# Patient Record
Sex: FEMALE | Race: WHITE | NOT HISPANIC OR LATINO | Employment: UNEMPLOYED | URBAN - METROPOLITAN AREA
[De-identification: names, ages, dates, MRNs, and addresses within clinical notes are randomized per-mention and may not be internally consistent; named-entity substitution may affect disease eponyms.]

---

## 2017-01-11 ENCOUNTER — APPOINTMENT (OUTPATIENT)
Dept: LAB | Facility: HOSPITAL | Age: 72
End: 2017-01-11
Attending: UROLOGY
Payer: MEDICARE

## 2017-01-11 ENCOUNTER — TRANSCRIBE ORDERS (OUTPATIENT)
Dept: LAB | Facility: HOSPITAL | Age: 72
End: 2017-01-11

## 2017-01-11 DIAGNOSIS — N20.0 URIC ACID NEPHROLITHIASIS: ICD-10-CM

## 2017-01-11 DIAGNOSIS — N20.0 CALCULUS OF KIDNEY: ICD-10-CM

## 2017-01-11 DIAGNOSIS — N20.0 URIC ACID NEPHROLITHIASIS: Primary | ICD-10-CM

## 2017-01-11 LAB
ABO GROUP BLD: NORMAL
ANION GAP SERPL CALCULATED.3IONS-SCNC: 6 MMOL/L (ref 4–13)
ATRIAL RATE: 78 BPM
BLD GP AB SCN SERPL QL: NEGATIVE
BUN SERPL-MCNC: 25 MG/DL (ref 5–25)
CALCIUM SERPL-MCNC: 8.9 MG/DL (ref 8.3–10.1)
CHLORIDE SERPL-SCNC: 103 MMOL/L (ref 100–108)
CO2 SERPL-SCNC: 30 MMOL/L (ref 21–32)
CREAT SERPL-MCNC: 0.9 MG/DL (ref 0.6–1.3)
GFR SERPL CREATININE-BSD FRML MDRD: >60 ML/MIN/1.73SQ M
GLUCOSE SERPL-MCNC: 147 MG/DL (ref 65–140)
P AXIS: 67 DEGREES
POTASSIUM SERPL-SCNC: 4.5 MMOL/L (ref 3.5–5.3)
PR INTERVAL: 136 MS
QRS AXIS: 7 DEGREES
QRSD INTERVAL: 70 MS
QT INTERVAL: 366 MS
QTC INTERVAL: 417 MS
RH BLD: POSITIVE
SODIUM SERPL-SCNC: 139 MMOL/L (ref 136–145)
T WAVE AXIS: 48 DEGREES
VENTRICULAR RATE: 78 BPM

## 2017-01-11 PROCEDURE — 80048 BASIC METABOLIC PNL TOTAL CA: CPT

## 2017-01-11 PROCEDURE — 86901 BLOOD TYPING SEROLOGIC RH(D): CPT

## 2017-01-11 PROCEDURE — 36415 COLL VENOUS BLD VENIPUNCTURE: CPT

## 2017-01-11 PROCEDURE — 86900 BLOOD TYPING SEROLOGIC ABO: CPT

## 2017-01-11 PROCEDURE — 86850 RBC ANTIBODY SCREEN: CPT

## 2017-01-11 PROCEDURE — 93005 ELECTROCARDIOGRAM TRACING: CPT

## 2017-01-12 ENCOUNTER — APPOINTMENT (OUTPATIENT)
Dept: MAMMOGRAPHY | Facility: HOSPITAL | Age: 72
End: 2017-01-12
Payer: MEDICARE

## 2017-01-19 ENCOUNTER — ALLSCRIPTS OFFICE VISIT (OUTPATIENT)
Dept: OTHER | Facility: OTHER | Age: 72
End: 2017-01-19

## 2017-01-20 RX ORDER — OLMESARTAN MEDOXOMIL 40 MG/1
40 TABLET ORAL DAILY
COMMUNITY
End: 2018-05-08 | Stop reason: SDUPTHER

## 2017-02-14 ENCOUNTER — ANESTHESIA EVENT (OUTPATIENT)
Dept: PERIOP | Facility: HOSPITAL | Age: 72
End: 2017-02-14
Payer: MEDICARE

## 2017-02-15 ENCOUNTER — ANESTHESIA (OUTPATIENT)
Dept: PERIOP | Facility: HOSPITAL | Age: 72
End: 2017-02-15
Payer: MEDICARE

## 2017-02-15 ENCOUNTER — APPOINTMENT (OUTPATIENT)
Dept: RADIOLOGY | Facility: HOSPITAL | Age: 72
End: 2017-02-15
Attending: UROLOGY
Payer: MEDICARE

## 2017-02-15 ENCOUNTER — HOSPITAL ENCOUNTER (OUTPATIENT)
Facility: HOSPITAL | Age: 72
Discharge: HOME | End: 2017-02-16
Attending: UROLOGY | Admitting: UROLOGY
Payer: MEDICARE

## 2017-02-15 ENCOUNTER — APPOINTMENT (OUTPATIENT)
Dept: RADIOLOGY | Facility: HOSPITAL | Age: 72
End: 2017-02-15
Payer: MEDICARE

## 2017-02-15 ENCOUNTER — GENERIC CONVERSION - ENCOUNTER (OUTPATIENT)
Dept: PERIOP | Facility: HOSPITAL | Age: 72
End: 2017-02-15

## 2017-02-15 DIAGNOSIS — N20.0 URIC ACID NEPHROLITHIASIS: ICD-10-CM

## 2017-02-15 DIAGNOSIS — N20.0 CALCULUS OF KIDNEY: ICD-10-CM

## 2017-02-15 LAB
ABO GROUP BLD: NORMAL
ANION GAP SERPL CALCULATED.3IONS-SCNC: 8 MMOL/L (ref 4–13)
BLD GP AB SCN SERPL QL: NEGATIVE
BUN SERPL-MCNC: 20 MG/DL (ref 5–25)
CALCIUM SERPL-MCNC: 8.1 MG/DL (ref 8.3–10.1)
CHLORIDE SERPL-SCNC: 111 MMOL/L (ref 100–108)
CO2 SERPL-SCNC: 25 MMOL/L (ref 21–32)
CREAT SERPL-MCNC: 0.93 MG/DL (ref 0.6–1.3)
ERYTHROCYTE [DISTWIDTH] IN BLOOD BY AUTOMATED COUNT: 14.5 % (ref 11.6–15.1)
GFR SERPL CREATININE-BSD FRML MDRD: 59.4 ML/MIN/1.73SQ M
GLUCOSE SERPL-MCNC: 147 MG/DL (ref 65–140)
HCT VFR BLD AUTO: 35.2 % (ref 34.8–46.1)
HGB BLD-MCNC: 11.8 G/DL (ref 11.5–15.4)
MCH RBC QN AUTO: 31.6 PG (ref 26.8–34.3)
MCHC RBC AUTO-ENTMCNC: 33.5 G/DL (ref 31.4–37.4)
MCV RBC AUTO: 94 FL (ref 82–98)
PLATELET # BLD AUTO: 227 THOUSANDS/UL (ref 149–390)
PMV BLD AUTO: 9.5 FL (ref 8.9–12.7)
POTASSIUM SERPL-SCNC: 3.7 MMOL/L (ref 3.5–5.3)
RBC # BLD AUTO: 3.74 MILLION/UL (ref 3.81–5.12)
RH BLD: POSITIVE
SODIUM SERPL-SCNC: 144 MMOL/L (ref 136–145)
WBC # BLD AUTO: 6.25 THOUSAND/UL (ref 4.31–10.16)

## 2017-02-15 PROCEDURE — C1769 GUIDE WIRE: HCPCS | Performed by: UROLOGY

## 2017-02-15 PROCEDURE — 50395 HB CREATE PASSAGE TO KIDNEY: CPT

## 2017-02-15 PROCEDURE — C1758 CATHETER, URETERAL: HCPCS | Performed by: UROLOGY

## 2017-02-15 PROCEDURE — 71010 HB CHEST X-RAY 1 VIEW FRONTAL (PORTABLE): CPT

## 2017-02-15 PROCEDURE — 86850 RBC ANTIBODY SCREEN: CPT | Performed by: UROLOGY

## 2017-02-15 PROCEDURE — 74176 CT ABD & PELVIS W/O CONTRAST: CPT

## 2017-02-15 PROCEDURE — 86900 BLOOD TYPING SEROLOGIC ABO: CPT | Performed by: UROLOGY

## 2017-02-15 PROCEDURE — 86901 BLOOD TYPING SEROLOGIC RH(D): CPT | Performed by: UROLOGY

## 2017-02-15 PROCEDURE — C1726 CATH, BAL DIL, NON-VASCULAR: HCPCS | Performed by: UROLOGY

## 2017-02-15 PROCEDURE — 82360 CALCULUS ASSAY QUANT: CPT | Performed by: UROLOGY

## 2017-02-15 PROCEDURE — C2617 STENT, NON-COR, TEM W/O DEL: HCPCS | Performed by: UROLOGY

## 2017-02-15 PROCEDURE — 80048 BASIC METABOLIC PNL TOTAL CA: CPT | Performed by: UROLOGY

## 2017-02-15 PROCEDURE — 85027 COMPLETE CBC AUTOMATED: CPT | Performed by: UROLOGY

## 2017-02-15 DEVICE — INLAY OPTIMA URETERAL STENT W/O GUIDEWIRE
Type: IMPLANTABLE DEVICE | Site: URETER | Status: FUNCTIONAL
Brand: BARD® INLAY OPTIMA® URETERAL STENT

## 2017-02-15 RX ORDER — ROCURONIUM BROMIDE 10 MG/ML
INJECTION, SOLUTION INTRAVENOUS AS NEEDED
Status: DISCONTINUED | OUTPATIENT
Start: 2017-02-15 | End: 2017-02-15 | Stop reason: SURG

## 2017-02-15 RX ORDER — MULTIVIT WITH MINERALS/LUTEIN
2000 TABLET ORAL DAILY
COMMUNITY
End: 2017-02-16 | Stop reason: HOSPADM

## 2017-02-15 RX ORDER — SODIUM CHLORIDE, SODIUM LACTATE, POTASSIUM CHLORIDE, CALCIUM CHLORIDE 600; 310; 30; 20 MG/100ML; MG/100ML; MG/100ML; MG/100ML
75 INJECTION, SOLUTION INTRAVENOUS CONTINUOUS
Status: DISCONTINUED | OUTPATIENT
Start: 2017-02-15 | End: 2017-02-15

## 2017-02-15 RX ORDER — OXYCODONE HYDROCHLORIDE 10 MG/1
10 TABLET ORAL EVERY 4 HOURS PRN
Status: DISCONTINUED | OUTPATIENT
Start: 2017-02-15 | End: 2017-02-16 | Stop reason: HOSPADM

## 2017-02-15 RX ORDER — FENTANYL CITRATE/PF 50 MCG/ML
25 SYRINGE (ML) INJECTION
Status: DISCONTINUED | OUTPATIENT
Start: 2017-02-15 | End: 2017-02-15 | Stop reason: HOSPADM

## 2017-02-15 RX ORDER — HEPARIN SODIUM 5000 [USP'U]/ML
5000 INJECTION, SOLUTION INTRAVENOUS; SUBCUTANEOUS EVERY 8 HOURS SCHEDULED
Status: DISCONTINUED | OUTPATIENT
Start: 2017-02-15 | End: 2017-02-16 | Stop reason: HOSPADM

## 2017-02-15 RX ORDER — OLMESARTAN MEDOXOMIL 20 MG/1
40 TABLET ORAL DAILY
Status: DISCONTINUED | OUTPATIENT
Start: 2017-02-16 | End: 2017-02-16 | Stop reason: HOSPADM

## 2017-02-15 RX ORDER — MULTIVITAMIN
1 TABLET ORAL DAILY
COMMUNITY
End: 2017-02-16 | Stop reason: HOSPADM

## 2017-02-15 RX ORDER — PROPOFOL 10 MG/ML
INJECTION, EMULSION INTRAVENOUS AS NEEDED
Status: DISCONTINUED | OUTPATIENT
Start: 2017-02-15 | End: 2017-02-15 | Stop reason: SURG

## 2017-02-15 RX ORDER — EPHEDRINE SULFATE 50 MG/ML
INJECTION, SOLUTION INTRAVENOUS AS NEEDED
Status: DISCONTINUED | OUTPATIENT
Start: 2017-02-15 | End: 2017-02-15 | Stop reason: SURG

## 2017-02-15 RX ORDER — SODIUM CHLORIDE 9 MG/ML
INJECTION, SOLUTION INTRAVENOUS CONTINUOUS PRN
Status: DISCONTINUED | OUTPATIENT
Start: 2017-02-15 | End: 2017-02-15 | Stop reason: SURG

## 2017-02-15 RX ORDER — TAMSULOSIN HYDROCHLORIDE 0.4 MG/1
0.4 CAPSULE ORAL
Qty: 30 CAPSULE | Refills: 0 | Status: SHIPPED | OUTPATIENT
Start: 2017-02-15 | End: 2018-05-08 | Stop reason: CLARIF

## 2017-02-15 RX ORDER — LIDOCAINE HYDROCHLORIDE 10 MG/ML
INJECTION, SOLUTION INFILTRATION; PERINEURAL AS NEEDED
Status: DISCONTINUED | OUTPATIENT
Start: 2017-02-15 | End: 2017-02-15 | Stop reason: SURG

## 2017-02-15 RX ORDER — ACETAMINOPHEN 325 MG/1
650 TABLET ORAL EVERY 4 HOURS PRN
Status: DISCONTINUED | OUTPATIENT
Start: 2017-02-15 | End: 2017-02-16 | Stop reason: HOSPADM

## 2017-02-15 RX ORDER — METOCLOPRAMIDE HYDROCHLORIDE 5 MG/ML
5 INJECTION INTRAMUSCULAR; INTRAVENOUS EVERY 6 HOURS PRN
Status: DISCONTINUED | OUTPATIENT
Start: 2017-02-15 | End: 2017-02-15 | Stop reason: HOSPADM

## 2017-02-15 RX ORDER — SODIUM CHLORIDE, SODIUM LACTATE, POTASSIUM CHLORIDE, CALCIUM CHLORIDE 600; 310; 30; 20 MG/100ML; MG/100ML; MG/100ML; MG/100ML
125 INJECTION, SOLUTION INTRAVENOUS CONTINUOUS
Status: DISCONTINUED | OUTPATIENT
Start: 2017-02-15 | End: 2017-02-16 | Stop reason: HOSPADM

## 2017-02-15 RX ORDER — HYDROCODONE BITARTRATE AND ACETAMINOPHEN 5; 325 MG/1; MG/1
1 TABLET ORAL EVERY 6 HOURS PRN
Qty: 25 TABLET | Refills: 0 | Status: SHIPPED | OUTPATIENT
Start: 2017-02-15 | End: 2017-02-25

## 2017-02-15 RX ORDER — OXYCODONE HYDROCHLORIDE 5 MG/1
5 TABLET ORAL EVERY 4 HOURS PRN
Status: DISCONTINUED | OUTPATIENT
Start: 2017-02-15 | End: 2017-02-16 | Stop reason: HOSPADM

## 2017-02-15 RX ORDER — ONDANSETRON 2 MG/ML
4 INJECTION INTRAMUSCULAR; INTRAVENOUS EVERY 4 HOURS PRN
Status: DISCONTINUED | OUTPATIENT
Start: 2017-02-15 | End: 2017-02-16 | Stop reason: HOSPADM

## 2017-02-15 RX ORDER — ONDANSETRON 2 MG/ML
4 INJECTION INTRAMUSCULAR; INTRAVENOUS EVERY 6 HOURS PRN
Status: DISCONTINUED | OUTPATIENT
Start: 2017-02-15 | End: 2017-02-15 | Stop reason: HOSPADM

## 2017-02-15 RX ORDER — SODIUM CHLORIDE, SODIUM LACTATE, POTASSIUM CHLORIDE, CALCIUM CHLORIDE 600; 310; 30; 20 MG/100ML; MG/100ML; MG/100ML; MG/100ML
INJECTION, SOLUTION INTRAVENOUS CONTINUOUS PRN
Status: DISCONTINUED | OUTPATIENT
Start: 2017-02-15 | End: 2017-02-15 | Stop reason: SURG

## 2017-02-15 RX ORDER — FENTANYL CITRATE 50 UG/ML
INJECTION, SOLUTION INTRAMUSCULAR; INTRAVENOUS AS NEEDED
Status: DISCONTINUED | OUTPATIENT
Start: 2017-02-15 | End: 2017-02-15 | Stop reason: SURG

## 2017-02-15 RX ORDER — ONDANSETRON 2 MG/ML
INJECTION INTRAMUSCULAR; INTRAVENOUS AS NEEDED
Status: DISCONTINUED | OUTPATIENT
Start: 2017-02-15 | End: 2017-02-15 | Stop reason: SURG

## 2017-02-15 RX ORDER — GLYCOPYRROLATE 0.2 MG/ML
INJECTION INTRAMUSCULAR; INTRAVENOUS AS NEEDED
Status: DISCONTINUED | OUTPATIENT
Start: 2017-02-15 | End: 2017-02-15 | Stop reason: SURG

## 2017-02-15 RX ADMIN — PHENYLEPHRINE HYDROCHLORIDE 30 MCG/MIN: 10 INJECTION INTRAVENOUS at 09:48

## 2017-02-15 RX ADMIN — FENTANYL CITRATE 25 MCG: 50 INJECTION INTRAMUSCULAR; INTRAVENOUS at 11:41

## 2017-02-15 RX ADMIN — FENTANYL CITRATE 50 MCG: 50 INJECTION, SOLUTION INTRAMUSCULAR; INTRAVENOUS at 09:54

## 2017-02-15 RX ADMIN — PROPOFOL 180 MG: 10 INJECTION, EMULSION INTRAVENOUS at 08:54

## 2017-02-15 RX ADMIN — CEFAZOLIN SODIUM 1000 MG: 1 SOLUTION INTRAVENOUS at 17:06

## 2017-02-15 RX ADMIN — GLYCOPYRROLATE 0.5 MG: 0.2 INJECTION INTRAMUSCULAR; INTRAVENOUS at 10:37

## 2017-02-15 RX ADMIN — SODIUM CHLORIDE, SODIUM LACTATE, POTASSIUM CHLORIDE, AND CALCIUM CHLORIDE 125 ML/HR: .6; .31; .03; .02 INJECTION, SOLUTION INTRAVENOUS at 11:25

## 2017-02-15 RX ADMIN — CEFAZOLIN SODIUM 1000 MG: 1 SOLUTION INTRAVENOUS at 09:10

## 2017-02-15 RX ADMIN — FENTANYL CITRATE 50 MCG: 50 INJECTION, SOLUTION INTRAMUSCULAR; INTRAVENOUS at 08:54

## 2017-02-15 RX ADMIN — OXYCODONE HYDROCHLORIDE 10 MG: 10 TABLET ORAL at 16:24

## 2017-02-15 RX ADMIN — NEOSTIGMINE METHYLSULFATE 4 MG: 1 INJECTION INTRAMUSCULAR; INTRAVENOUS; SUBCUTANEOUS at 10:37

## 2017-02-15 RX ADMIN — EPHEDRINE SULFATE 10 MG: 50 INJECTION, SOLUTION INTRAMUSCULAR; INTRAVENOUS; SUBCUTANEOUS at 09:47

## 2017-02-15 RX ADMIN — ONDANSETRON 4 MG: 2 INJECTION INTRAMUSCULAR; INTRAVENOUS at 10:24

## 2017-02-15 RX ADMIN — FENTANYL CITRATE 25 MCG: 50 INJECTION INTRAMUSCULAR; INTRAVENOUS at 12:19

## 2017-02-15 RX ADMIN — FENTANYL CITRATE 25 MCG: 50 INJECTION INTRAMUSCULAR; INTRAVENOUS at 11:10

## 2017-02-15 RX ADMIN — ROCURONIUM BROMIDE 50 MG: 10 INJECTION, SOLUTION INTRAVENOUS at 08:54

## 2017-02-15 RX ADMIN — DEXAMETHASONE SODIUM PHOSPHATE 10 MG: 10 INJECTION INTRAMUSCULAR; INTRAVENOUS at 09:22

## 2017-02-15 RX ADMIN — LIDOCAINE HYDROCHLORIDE 50 MG: 10 INJECTION, SOLUTION INFILTRATION; PERINEURAL at 08:54

## 2017-02-15 RX ADMIN — EPHEDRINE SULFATE 10 MG: 50 INJECTION, SOLUTION INTRAMUSCULAR; INTRAVENOUS; SUBCUTANEOUS at 09:25

## 2017-02-15 RX ADMIN — HYDROMORPHONE HYDROCHLORIDE 0.5 MG: 1 INJECTION, SOLUTION INTRAMUSCULAR; INTRAVENOUS; SUBCUTANEOUS at 17:06

## 2017-02-15 RX ADMIN — HEPARIN SODIUM 5000 UNITS: 5000 INJECTION, SOLUTION INTRAVENOUS; SUBCUTANEOUS at 22:07

## 2017-02-15 RX ADMIN — OXYCODONE HYDROCHLORIDE 10 MG: 10 TABLET ORAL at 22:07

## 2017-02-15 RX ADMIN — FENTANYL CITRATE 50 MCG: 50 INJECTION, SOLUTION INTRAMUSCULAR; INTRAVENOUS at 09:20

## 2017-02-15 RX ADMIN — FENTANYL CITRATE 25 MCG: 50 INJECTION INTRAMUSCULAR; INTRAVENOUS at 11:19

## 2017-02-15 RX ADMIN — FENTANYL CITRATE 25 MCG: 50 INJECTION INTRAMUSCULAR; INTRAVENOUS at 11:52

## 2017-02-15 RX ADMIN — SODIUM CHLORIDE, SODIUM LACTATE, POTASSIUM CHLORIDE, AND CALCIUM CHLORIDE 125 ML/HR: .6; .31; .03; .02 INJECTION, SOLUTION INTRAVENOUS at 22:08

## 2017-02-15 RX ADMIN — SODIUM CHLORIDE, SODIUM LACTATE, POTASSIUM CHLORIDE, AND CALCIUM CHLORIDE: .6; .31; .03; .02 INJECTION, SOLUTION INTRAVENOUS at 08:40

## 2017-02-15 RX ADMIN — SODIUM CHLORIDE: 0.9 INJECTION, SOLUTION INTRAVENOUS at 09:01

## 2017-02-16 VITALS
TEMPERATURE: 97.9 F | HEART RATE: 88 BPM | SYSTOLIC BLOOD PRESSURE: 146 MMHG | WEIGHT: 157 LBS | DIASTOLIC BLOOD PRESSURE: 66 MMHG | RESPIRATION RATE: 18 BRPM | BODY MASS INDEX: 27.82 KG/M2 | OXYGEN SATURATION: 98 % | HEIGHT: 63 IN

## 2017-02-16 LAB
ANION GAP SERPL CALCULATED.3IONS-SCNC: 8 MMOL/L (ref 4–13)
BUN SERPL-MCNC: 13 MG/DL (ref 5–25)
CALCIUM SERPL-MCNC: 8.2 MG/DL (ref 8.3–10.1)
CHLORIDE SERPL-SCNC: 108 MMOL/L (ref 100–108)
CO2 SERPL-SCNC: 26 MMOL/L (ref 21–32)
CREAT SERPL-MCNC: 0.83 MG/DL (ref 0.6–1.3)
ERYTHROCYTE [DISTWIDTH] IN BLOOD BY AUTOMATED COUNT: 14.7 % (ref 11.6–15.1)
GFR SERPL CREATININE-BSD FRML MDRD: >60 ML/MIN/1.73SQ M
GLUCOSE SERPL-MCNC: 114 MG/DL (ref 65–140)
HCT VFR BLD AUTO: 32.7 % (ref 34.8–46.1)
HGB BLD-MCNC: 11 G/DL (ref 11.5–15.4)
MCH RBC QN AUTO: 31.7 PG (ref 26.8–34.3)
MCHC RBC AUTO-ENTMCNC: 33.6 G/DL (ref 31.4–37.4)
MCV RBC AUTO: 94 FL (ref 82–98)
PLATELET # BLD AUTO: 235 THOUSANDS/UL (ref 149–390)
PMV BLD AUTO: 9.7 FL (ref 8.9–12.7)
POTASSIUM SERPL-SCNC: 4.2 MMOL/L (ref 3.5–5.3)
RBC # BLD AUTO: 3.47 MILLION/UL (ref 3.81–5.12)
SODIUM SERPL-SCNC: 142 MMOL/L (ref 136–145)
WBC # BLD AUTO: 6.79 THOUSAND/UL (ref 4.31–10.16)

## 2017-02-16 PROCEDURE — 85027 COMPLETE CBC AUTOMATED: CPT | Performed by: UROLOGY

## 2017-02-16 PROCEDURE — 80048 BASIC METABOLIC PNL TOTAL CA: CPT | Performed by: UROLOGY

## 2017-02-16 RX ADMIN — OXYCODONE HYDROCHLORIDE 10 MG: 10 TABLET ORAL at 10:35

## 2017-02-16 RX ADMIN — CEFAZOLIN SODIUM 1000 MG: 1 SOLUTION INTRAVENOUS at 08:31

## 2017-02-16 RX ADMIN — OLMESARTAN MEDOXOMIL 40 MG: 20 TABLET, FILM COATED ORAL at 08:32

## 2017-02-16 RX ADMIN — CEFAZOLIN SODIUM 1000 MG: 1 SOLUTION INTRAVENOUS at 00:44

## 2017-02-16 RX ADMIN — HEPARIN SODIUM 5000 UNITS: 5000 INJECTION, SOLUTION INTRAVENOUS; SUBCUTANEOUS at 05:34

## 2017-02-16 RX ADMIN — SODIUM CHLORIDE, SODIUM LACTATE, POTASSIUM CHLORIDE, AND CALCIUM CHLORIDE 125 ML/HR: .6; .31; .03; .02 INJECTION, SOLUTION INTRAVENOUS at 06:11

## 2017-02-21 LAB
CA PHOS MFR STONE: 3 %
CALCIUM OXALATE DIHYDRATE MFR STONE IR: 35 %
COLOR STONE: NORMAL
COM MFR STONE: 62 %
COMMENT-STONE3: NORMAL
COMPOSITION: NORMAL
LABORATORY COMMENT REPORT: NORMAL
NIDUS STONE QL: NORMAL
PHOTO: NORMAL
SIZE STONE: NORMAL MM
STONE ANALYSIS-IMP: NORMAL
STONE ANALYSIS-IMP: NORMAL
SURFACE CRYSTALS: NORMAL
WT STONE: 9 MG

## 2017-03-09 ENCOUNTER — ALLSCRIPTS OFFICE VISIT (OUTPATIENT)
Dept: OTHER | Facility: OTHER | Age: 72
End: 2017-03-09

## 2017-03-09 LAB
BILIRUB UR QL STRIP: NORMAL
CLARITY UR: NORMAL
COLOR UR: NORMAL
GLUCOSE (HISTORICAL): NORMAL
HGB UR QL STRIP.AUTO: NORMAL
KETONES UR STRIP-MCNC: NORMAL MG/DL
LEUKOCYTE ESTERASE UR QL STRIP: NORMAL
NITRITE UR QL STRIP: NORMAL
PH UR STRIP.AUTO: 8.5 [PH]
PROT UR STRIP-MCNC: NORMAL MG/DL
SP GR UR STRIP.AUTO: 1

## 2017-05-19 DIAGNOSIS — E78.5 HYPERLIPIDEMIA: ICD-10-CM

## 2017-05-19 DIAGNOSIS — N20.0 CALCULUS OF KIDNEY: ICD-10-CM

## 2017-05-25 ENCOUNTER — ALLSCRIPTS OFFICE VISIT (OUTPATIENT)
Dept: OTHER | Facility: OTHER | Age: 72
End: 2017-05-25

## 2017-06-06 ENCOUNTER — ALLSCRIPTS OFFICE VISIT (OUTPATIENT)
Dept: OTHER | Facility: OTHER | Age: 72
End: 2017-06-06

## 2017-06-06 ENCOUNTER — APPOINTMENT (OUTPATIENT)
Dept: LAB | Facility: HOSPITAL | Age: 72
End: 2017-06-06
Attending: INTERNAL MEDICINE
Payer: MEDICARE

## 2017-06-06 DIAGNOSIS — N20.0 CALCULUS OF KIDNEY: ICD-10-CM

## 2017-06-06 LAB
25(OH)D3 SERPL-MCNC: 44.6 NG/ML (ref 30–100)
ANION GAP SERPL CALCULATED.3IONS-SCNC: 8 MMOL/L (ref 4–13)
BACTERIA UR QL AUTO: ABNORMAL /HPF
BASOPHILS # BLD AUTO: 0.04 THOUSANDS/ΜL (ref 0–0.1)
BASOPHILS NFR BLD AUTO: 1 % (ref 0–1)
BILIRUB UR QL STRIP: NEGATIVE
BUN SERPL-MCNC: 26 MG/DL (ref 5–25)
CALCIUM SERPL-MCNC: 9.8 MG/DL (ref 8.3–10.1)
CHLORIDE SERPL-SCNC: 103 MMOL/L (ref 100–108)
CLARITY UR: CLEAR
CO2 SERPL-SCNC: 29 MMOL/L (ref 21–32)
COLOR UR: YELLOW
CREAT SERPL-MCNC: 0.86 MG/DL (ref 0.6–1.3)
CREAT UR-MCNC: 128 MG/DL
EOSINOPHIL # BLD AUTO: 0.16 THOUSAND/ΜL (ref 0–0.61)
EOSINOPHIL NFR BLD AUTO: 3 % (ref 0–6)
ERYTHROCYTE [DISTWIDTH] IN BLOOD BY AUTOMATED COUNT: 13.2 % (ref 11.6–15.1)
GFR SERPL CREATININE-BSD FRML MDRD: >60 ML/MIN/1.73SQ M
GLUCOSE P FAST SERPL-MCNC: 111 MG/DL (ref 65–99)
GLUCOSE UR STRIP-MCNC: NEGATIVE MG/DL
HCT VFR BLD AUTO: 43 % (ref 34.8–46.1)
HGB BLD-MCNC: 14.1 G/DL (ref 11.5–15.4)
HGB UR QL STRIP.AUTO: NEGATIVE
KETONES UR STRIP-MCNC: NEGATIVE MG/DL
LEUKOCYTE ESTERASE UR QL STRIP: NEGATIVE
LYMPHOCYTES # BLD AUTO: 1.28 THOUSANDS/ΜL (ref 0.6–4.47)
LYMPHOCYTES NFR BLD AUTO: 26 % (ref 14–44)
MCH RBC QN AUTO: 31.3 PG (ref 26.8–34.3)
MCHC RBC AUTO-ENTMCNC: 32.8 G/DL (ref 31.4–37.4)
MCV RBC AUTO: 96 FL (ref 82–98)
MICROALBUMIN UR-MCNC: 11.9 MG/L (ref 0–20)
MICROALBUMIN/CREAT 24H UR: 9 MG/G CREATININE (ref 0–30)
MONOCYTES # BLD AUTO: 0.31 THOUSAND/ΜL (ref 0.17–1.22)
MONOCYTES NFR BLD AUTO: 6 % (ref 4–12)
NEUTROPHILS # BLD AUTO: 3.11 THOUSANDS/ΜL (ref 1.85–7.62)
NEUTS SEG NFR BLD AUTO: 63 % (ref 43–75)
NITRITE UR QL STRIP: NEGATIVE
NON-SQ EPI CELLS URNS QL MICRO: ABNORMAL /HPF
NRBC BLD AUTO-RTO: 0 /100 WBCS
PH UR STRIP.AUTO: 6 [PH] (ref 4.5–8)
PHOSPHATE SERPL-MCNC: 4 MG/DL (ref 2.3–4.1)
PLATELET # BLD AUTO: 321 THOUSANDS/UL (ref 149–390)
PMV BLD AUTO: 9.5 FL (ref 8.9–12.7)
POTASSIUM SERPL-SCNC: 4.5 MMOL/L (ref 3.5–5.3)
PROT UR STRIP-MCNC: NEGATIVE MG/DL
PTH-INTACT SERPL-MCNC: 23 PG/ML (ref 14–72)
RBC # BLD AUTO: 4.5 MILLION/UL (ref 3.81–5.12)
RBC #/AREA URNS AUTO: ABNORMAL /HPF
SODIUM SERPL-SCNC: 140 MMOL/L (ref 136–145)
SP GR UR STRIP.AUTO: 1.02 (ref 1–1.03)
URATE SERPL-MCNC: 5.5 MG/DL (ref 2–6.8)
UROBILINOGEN UR QL STRIP.AUTO: 0.2 E.U./DL
WBC # BLD AUTO: 4.92 THOUSAND/UL (ref 4.31–10.16)
WBC #/AREA URNS AUTO: ABNORMAL /HPF

## 2017-06-06 PROCEDURE — 84550 ASSAY OF BLOOD/URIC ACID: CPT

## 2017-06-06 PROCEDURE — 36415 COLL VENOUS BLD VENIPUNCTURE: CPT

## 2017-06-06 PROCEDURE — 82570 ASSAY OF URINE CREATININE: CPT

## 2017-06-06 PROCEDURE — 80048 BASIC METABOLIC PNL TOTAL CA: CPT

## 2017-06-06 PROCEDURE — 84100 ASSAY OF PHOSPHORUS: CPT

## 2017-06-06 PROCEDURE — 83970 ASSAY OF PARATHORMONE: CPT

## 2017-06-06 PROCEDURE — 85025 COMPLETE CBC W/AUTO DIFF WBC: CPT

## 2017-06-06 PROCEDURE — 82043 UR ALBUMIN QUANTITATIVE: CPT

## 2017-06-06 PROCEDURE — 82306 VITAMIN D 25 HYDROXY: CPT

## 2017-06-06 PROCEDURE — 81001 URINALYSIS AUTO W/SCOPE: CPT

## 2017-06-29 ENCOUNTER — HOSPITAL ENCOUNTER (OUTPATIENT)
Dept: RADIOLOGY | Facility: HOSPITAL | Age: 72
Discharge: HOME | End: 2017-06-29
Attending: UROLOGY
Payer: MEDICARE

## 2017-06-29 ENCOUNTER — TRANSCRIBE ORDERS (OUTPATIENT)
Dept: ADMINISTRATIVE | Facility: HOSPITAL | Age: 72
End: 2017-06-29

## 2017-06-29 DIAGNOSIS — N20.0 CALCULUS OF KIDNEY: ICD-10-CM

## 2017-06-29 PROCEDURE — 74000 HB X-RAY EXAM OF ABDOMEN (SINGLE ANTEROPOSTERIOR VIEW): CPT

## 2017-07-10 ENCOUNTER — ALLSCRIPTS OFFICE VISIT (OUTPATIENT)
Dept: OTHER | Facility: OTHER | Age: 72
End: 2017-07-10

## 2017-07-10 DIAGNOSIS — N20.0 CALCULUS OF KIDNEY: ICD-10-CM

## 2017-07-12 ENCOUNTER — TRANSCRIBE ORDERS (OUTPATIENT)
Dept: LAB | Facility: CLINIC | Age: 72
End: 2017-07-12

## 2017-07-12 ENCOUNTER — GENERIC CONVERSION - ENCOUNTER (OUTPATIENT)
Dept: OTHER | Facility: OTHER | Age: 72
End: 2017-07-12

## 2017-07-12 ENCOUNTER — APPOINTMENT (OUTPATIENT)
Dept: LAB | Facility: CLINIC | Age: 72
End: 2017-07-12
Payer: MEDICARE

## 2017-07-12 DIAGNOSIS — E78.5 HYPERLIPIDEMIA: ICD-10-CM

## 2017-07-12 LAB
CHOLEST SERPL-MCNC: 290 MG/DL (ref 50–200)
HDLC SERPL-MCNC: 93 MG/DL (ref 40–60)
LDLC SERPL CALC-MCNC: 182 MG/DL (ref 0–100)
TRIGL SERPL-MCNC: 76 MG/DL

## 2017-07-12 PROCEDURE — 36415 COLL VENOUS BLD VENIPUNCTURE: CPT

## 2017-07-12 PROCEDURE — 80061 LIPID PANEL: CPT

## 2017-08-22 ENCOUNTER — ALLSCRIPTS OFFICE VISIT (OUTPATIENT)
Dept: OTHER | Facility: OTHER | Age: 72
End: 2017-08-22

## 2017-08-22 DIAGNOSIS — I10 ESSENTIAL (PRIMARY) HYPERTENSION: ICD-10-CM

## 2017-09-27 ENCOUNTER — TRANSCRIBE ORDERS (OUTPATIENT)
Dept: ADMINISTRATIVE | Facility: HOSPITAL | Age: 72
End: 2017-09-27

## 2017-09-27 ENCOUNTER — APPOINTMENT (OUTPATIENT)
Dept: LAB | Facility: HOSPITAL | Age: 72
End: 2017-09-27
Attending: INTERNAL MEDICINE
Payer: MEDICARE

## 2017-09-27 ENCOUNTER — GENERIC CONVERSION - ENCOUNTER (OUTPATIENT)
Dept: OTHER | Facility: OTHER | Age: 72
End: 2017-09-27

## 2017-09-27 DIAGNOSIS — I10 ESSENTIAL (PRIMARY) HYPERTENSION: ICD-10-CM

## 2017-09-27 DIAGNOSIS — N20.0 CALCULUS OF KIDNEY: ICD-10-CM

## 2017-09-27 LAB
ALBUMIN SERPL BCP-MCNC: 3.3 G/DL (ref 3.5–5)
ALP SERPL-CCNC: 58 U/L (ref 46–116)
ALT SERPL W P-5'-P-CCNC: 21 U/L (ref 12–78)
ANION GAP SERPL CALCULATED.3IONS-SCNC: 8 MMOL/L (ref 4–13)
AST SERPL W P-5'-P-CCNC: 16 U/L (ref 5–45)
BILIRUB SERPL-MCNC: 0.4 MG/DL (ref 0.2–1)
BUN SERPL-MCNC: 19 MG/DL (ref 5–25)
CALCIUM SERPL-MCNC: 9.1 MG/DL (ref 8.3–10.1)
CHLORIDE SERPL-SCNC: 105 MMOL/L (ref 100–108)
CO2 SERPL-SCNC: 28 MMOL/L (ref 21–32)
CREAT SERPL-MCNC: 0.97 MG/DL (ref 0.6–1.3)
GFR SERPL CREATININE-BSD FRML MDRD: 59 ML/MIN/1.73SQ M
GLUCOSE P FAST SERPL-MCNC: 129 MG/DL (ref 65–99)
POTASSIUM SERPL-SCNC: 4.5 MMOL/L (ref 3.5–5.3)
PROT SERPL-MCNC: 7.5 G/DL (ref 6.4–8.2)
SODIUM SERPL-SCNC: 141 MMOL/L (ref 136–145)

## 2017-09-27 PROCEDURE — 36415 COLL VENOUS BLD VENIPUNCTURE: CPT

## 2017-09-27 PROCEDURE — 80053 COMPREHEN METABOLIC PANEL: CPT

## 2017-10-25 ENCOUNTER — APPOINTMENT (EMERGENCY)
Dept: RADIOLOGY | Facility: HOSPITAL | Age: 72
End: 2017-10-25
Payer: MEDICARE

## 2017-10-25 ENCOUNTER — HOSPITAL ENCOUNTER (EMERGENCY)
Facility: HOSPITAL | Age: 72
Discharge: HOME | End: 2017-10-25
Admitting: EMERGENCY MEDICINE
Payer: MEDICARE

## 2017-10-25 VITALS
DIASTOLIC BLOOD PRESSURE: 76 MMHG | SYSTOLIC BLOOD PRESSURE: 144 MMHG | HEIGHT: 63 IN | BODY MASS INDEX: 26.58 KG/M2 | TEMPERATURE: 98.9 F | HEART RATE: 79 BPM | OXYGEN SATURATION: 99 % | RESPIRATION RATE: 16 BRPM | WEIGHT: 150 LBS
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
## 2017-10-25 DIAGNOSIS — S61.452A DOG BITE OF LEFT HAND, INITIAL ENCOUNTER: Primary | ICD-10-CM

## 2017-10-25 PROCEDURE — 73130 X-RAY EXAM OF HAND: CPT

## 2017-10-25 PROCEDURE — 99283 EMERGENCY DEPT VISIT LOW MDM: CPT

## 2017-10-25 PROCEDURE — 90471 IMMUNIZATION ADMIN: CPT

## 2017-10-25 PROCEDURE — 90715 TDAP VACCINE 7 YRS/> IM: CPT | Performed by: NURSE PRACTITIONER

## 2017-10-25 RX ORDER — AMOXICILLIN AND CLAVULANATE POTASSIUM 875; 125 MG/1; MG/1
1 TABLET, FILM COATED ORAL 2 TIMES DAILY
Qty: 20 TABLET | Refills: 0 | Status: SHIPPED | OUTPATIENT
Start: 2017-10-25 | End: 2017-11-04

## 2017-10-25 RX ORDER — AMOXICILLIN AND CLAVULANATE POTASSIUM 875; 125 MG/1; MG/1
1 TABLET, FILM COATED ORAL ONCE
Status: COMPLETED | OUTPATIENT
Start: 2017-10-25 | End: 2017-10-25

## 2017-10-25 RX ORDER — HYDROCODONE BITARTRATE AND ACETAMINOPHEN 5; 325 MG/1; MG/1
1 TABLET ORAL ONCE
Status: COMPLETED | OUTPATIENT
Start: 2017-10-25 | End: 2017-10-25

## 2017-10-25 RX ORDER — HYDROCODONE BITARTRATE AND ACETAMINOPHEN 5; 325 MG/1; MG/1
1 TABLET ORAL EVERY 6 HOURS PRN
Qty: 12 TABLET | Refills: 0 | Status: SHIPPED | OUTPATIENT
Start: 2017-10-25 | End: 2018-11-08 | Stop reason: ALTCHOICE

## 2017-10-25 RX ADMIN — AMOXICILLIN AND CLAVULANATE POTASSIUM 1 TABLET: 875; 125 TABLET, FILM COATED ORAL at 14:57

## 2017-10-25 RX ADMIN — TETANUS TOXOID, REDUCED DIPHTHERIA TOXOID AND ACELLULAR PERTUSSIS VACCINE, ADSORBED 0.5 ML: 5; 2.5; 8; 8; 2.5 SUSPENSION INTRAMUSCULAR at 14:58

## 2017-10-25 RX ADMIN — HYDROCODONE BITARTRATE AND ACETAMINOPHEN 1 TABLET: 5; 325 TABLET ORAL at 14:57

## 2017-10-25 NOTE — ED NOTES
Pt refused to fill out [facility] animal bite form        Michelle Manning RN  10/25/17 0425

## 2017-10-25 NOTE — ED PROVIDER NOTES
History  Chief Complaint   Patient presents with    Dog Bite     Patient reports her dog bit her in the left wrist about 2 hours ago  States she was seen at urgent center and was told she might have nerve damage and that she needs to go to the ER to see a hand surgeon  66-year-old female who was seen at urgent care and directed to the ED emergency department  She was not really even evaluated she was just placed in a dry dressing and sent here she sustained a dog bite to the left thenar eminence area with puncture dorsally and palmar  She has pain with movement of the right thumb  I do not see any obvious tendon injury  She has full extension and flexion just a little uncomfortable  She states that her tetanus will be updated  She was expecting to see a hand surgeon when she came to the ER eye Re oriented her to realistic considerations and told that she likely does not need a hand surgeon emergently and that they do not come to our hospital   Will x-ray for bony injury  Start antibiotic therapy given the sensitive nature of the location  Update tetanus  The refer to hands as an outpatient  History provided by:  Patient      Prior to Admission Medications   Prescriptions Last Dose Informant Patient Reported? Taking?    olmesartan (BENICAR) 40 mg tablet   Yes No   Sig: Take 40 mg by mouth daily   tamsulosin (FLOMAX) 0 4 mg   No No   Sig: Take 1 capsule by mouth daily with dinner for 30 days      Facility-Administered Medications: None       Past Medical History:   Diagnosis Date    Hypertension        Past Surgical History:   Procedure Laterality Date    DILATION AND CURETTAGE OF UTERUS      WA CYSTO/URETERO/PYELOSCOPY, DX Right 2/15/2017    Procedure: URETEROSCOPY, STENT PLACEMENT ;  Surgeon: Luzma Fournier MD;  Location: [facility];  Service: Urology    WA CARLOS Rogers CM Right 2/15/2017    Procedure: ACCESS INTERPOLAR CALYX, INSERTED TWO WIRES INTO BLADDER, NEPHROLITHOTOMY PERCUTANEOUS ;  Surgeon: Michelle Toure MD;  Location[facility];  Service: Urology       Family History   Problem Relation Age of Onset    Cancer Mother      I have reviewed and agree with the history as documented  Social History   Substance Use Topics    Smoking status: Former Smoker    Smokeless tobacco: Former User     Quit date: 2007      Comment: smokes "very rarely"    Alcohol use Yes      Comment: social        Review of Systems   Constitutional: Negative for activity change, fatigue and fever  HENT: Negative for congestion, ear pain, rhinorrhea and sore throat  Eyes: Negative  Respiratory: Negative for cough, shortness of breath and wheezing  Gastrointestinal: Negative for abdominal pain, diarrhea, nausea and vomiting  Endocrine: Negative  Genitourinary: Negative for difficulty urinating, dyspareunia, dysuria, flank pain, frequency, menstrual problem, pelvic pain, urgency, vaginal bleeding, vaginal discharge and vaginal pain  Musculoskeletal: Negative for arthralgias and myalgias  Skin: Positive for wound  Negative for color change and pallor  Neurological: Negative for dizziness, speech difficulty, weakness and headaches  Hematological: Negative for adenopathy  Psychiatric/Behavioral: Negative for confusion  Physical Exam  ED Triage Vitals [10/25/17 1220]   Temperature Pulse Respirations Blood Pressure SpO2   98 9 °F (37 2 °C) 79 16 144/76 99 %      Temp Source Heart Rate Source Patient Position - Orthostatic VS BP Location FiO2 (%)   Oral Monitor Sitting Right arm --      Pain Score       7           Physical Exam   Constitutional: She is oriented to person, place, and time  She appears well-developed and well-nourished  She is cooperative  Non-toxic appearance  She does not have a sickly appearance  She does not appear ill  No distress  HENT:   Head: Normocephalic and atraumatic     Right Ear: Tympanic membrane and external ear normal    Left Ear: Tympanic membrane and external ear normal    Nose: No rhinorrhea, sinus tenderness or nasal deformity  No epistaxis  Right sinus exhibits no maxillary sinus tenderness and no frontal sinus tenderness  Left sinus exhibits no maxillary sinus tenderness and no frontal sinus tenderness  Mouth/Throat: Oropharynx is clear and moist and mucous membranes are normal  Normal dentition  Eyes: EOM are normal  Pupils are equal, round, and reactive to light  Neck: Normal range of motion  Neck supple  Cardiovascular: Normal rate, regular rhythm and normal heart sounds  No murmur heard  Pulmonary/Chest: Effort normal and breath sounds normal  No accessory muscle usage  No respiratory distress  She has no wheezes  She has no rales  She exhibits no tenderness  Abdominal: Soft  She exhibits no distension  There is no guarding  Musculoskeletal: Normal range of motion  She exhibits no edema or tenderness  Lymphadenopathy:     She has no cervical adenopathy  Neurological: She is alert and oriented to person, place, and time  She exhibits normal muscle tone  Skin: Skin is warm and dry  No rash noted  No erythema  Bite wound over the left thenar eminence and left dorsum  Dorsal bite wound is linear about 2 cm there does not appear to be any tendon involvement  She has full range of motion of the digits of the left hand  Full flexion and extension of the thumb  She describes a numbness sensation of her 2nd digit however this is subjective only  Likely posttraumatic paresthesia  Psychiatric: She has a normal mood and affect  Nursing note and vitals reviewed        ED Medications  Medications   tetanus-diphtheria-acellular pertussis (BOOSTRIX) IM injection 0 5 mL (0 5 mL Intramuscular Given 10/25/17 1458)   HYDROcodone-acetaminophen (NORCO) 5-325 mg per tablet 1 tablet (1 tablet Oral Given 10/25/17 1457)   amoxicillin-clavulanate (AUGMENTIN) 875-125 mg per tablet 1 tablet (1 tablet Oral Given 10/25/17 1457) Diagnostic Studies  Results Reviewed     None                 XR hand 3+ views LEFT    (Results Pending)              Procedures  Lac Repair  Date/Time: 10/25/2017 4:15 PM  Performed by: Jignesh Hagen  Authorized by: Jignesh Hagen     Patient location:  ED and bedside  Consent:     Consent obtained:  Verbal    Consent given by:  Patient    Risks discussed:  Infection, pain and poor cosmetic result    Alternatives discussed:  No treatment  Universal protocol:     Patient identity confirmed:  Verbally with patient and arm band  Anesthesia (see MAR for exact dosages): Anesthesia method:  None  Laceration details:     Location:  Hand    Hand location:  L hand, dorsum    Length (cm) of Repair:  2  Repair type:     Repair type:  Simple  Treatment:     Area cleansed with:  Saline and Hibiclens    Amount of cleaning:  Standard    Irrigation volume:  Soak for 20 min  Skin repair:     Repair method:  Steri-Strips    Number of Steri-Strips:  2  Post-procedure details:     Dressing:  Non-adherent dressing and splint for protection    Patient tolerance of procedure:   Tolerated well, no immediate complications  Static Splint Application  Date/Time: 10/25/2017 4:17 PM  Performed by: Farhan Petersen by: Jignesh Hagen     Patient location:  ED  Procedure performed by emergency physician: No    Other Assisting Provider: No    Consent:     Consent obtained:  Verbal    Consent given by:  Patient    Risks discussed:  Discoloration    Alternatives discussed:  No treatment  Universal protocol:     Patient identity confirmed:  Verbally with patient and arm band  Indication:     Indications: other medical problem (comment)      Indications comment:  Bite injury left thenar eminence  Procedure details:     Location:  Hand    Hand:  L hand    Splint type:  Thumb spica  Post-procedure details:     Pain:  Improved    Sensation:  Normal    Neurovascular Exam: skin pink, capillary refill <2 sec, normal pulses and skin intact, warm, and dry      Patient tolerance of procedure: Tolerated well, no immediate complications           Phone Contacts  ED Phone Contact    ED Course  ED Course                                MDM  Number of Diagnoses or Management Options  Dog bite of left hand, initial encounter: new and requires workup  Diagnosis management comments: No bony involvement  No subcutaneous gas  There is ecchymosis over the thenar eminence from the bite  She has full range of motion of the digits  Follow up with hands  Splinted for comfort and protection  Amount and/or Complexity of Data Reviewed  Tests in the radiology section of CPT®: reviewed and ordered  Discussion of test results with the performing providers: yes  Independent visualization of images, tracings, or specimens: yes    Patient Progress  Patient progress: improved    CritCare Time    Disposition  Final diagnoses:   Dog bite of left hand, initial encounter     Time reflects when diagnosis was documented in both MDM as applicable and the Disposition within this note     Time User Action Codes Description Comment    10/25/2017  2:59 PM Damon Araujo Add [P91 586A,  Lisa Lav  0XXA] Dog bite of left hand, initial encounter       ED Disposition     ED Disposition Condition Comment    Discharge  [facility] discharge to home/self care      Condition at discharge: Good        Follow-up Information     Follow up With Specialties Details Why Contact Info    Sharon Dee MD Orthopedic Surgery Schedule an appointment as soon as possible for a visit For Continued Evaluation [address]  [address]      Rodri Glass MD Orthopedic Surgery Schedule an appointment as soon as possible for a visit For Continued Evaluation [address]  [address] [place] [address] [facility]      Niki Beverly MD Plastic Surgery Schedule an appointment as soon as possible for a visit For Continued Evaluation [facility]/ [address]          Discharge Medication List as of 10/25/2017  3:01 PM      START taking these medications    Details   amoxicillin-clavulanate (AUGMENTIN) 875-125 mg per tablet Take 1 tablet by mouth 2 (two) times a day for 10 days, Starting Wed 10/25/2017, Until Sat 11/4/2017, Print      HYDROcodone-acetaminophen (NORCO) 5-325 mg per tablet Take 1 tablet by mouth every 6 (six) hours as needed (Not relieved by Anti-inflammatory) for up to 12 doses Max Daily Amount: 4 tablets, Starting Wed 10/25/2017, Print         CONTINUE these medications which have NOT CHANGED    Details   olmesartan (BENICAR) 40 mg tablet Take 40 mg by mouth daily, Until Discontinued, Historical Med      tamsulosin (FLOMAX) 0 4 mg Take 1 capsule by mouth daily with dinner for 30 days, Starting 2/15/2017, Until Fri 3/17/17, Print           No discharge procedures on file      ED Provider  Electronically Signed by           SILVER Medina  10/25/17 3307

## 2017-10-25 NOTE — DISCHARGE INSTRUCTIONS
Animal Bite   WHAT YOU NEED TO KNOW:   Animal bite injuries range from shallow cuts to deep, life-threatening wounds  An animal can cut or puncture the skin when it bites  Your skin may be torn from your body  Your skin may swell or bruise even if the bite does not break the skin  Animal bites occur more often on the hands, arms, legs, and face  Bites from dogs and cats are the most common injuries  DISCHARGE INSTRUCTIONS:   Return to the emergency department if:   · You have a fever  · Your wound is red, swollen, and draining pus  · You see red streaks on the skin around the wound  · You can no longer move the bitten area  · Your heartbeat and breathing are much faster than usual     · You feel dizzy and confused  Contact your healthcare provider if:   · Your pain does not get better, even after you take pain medicine  · You have nightmares or flashbacks about the animal bite  · You have questions or concerns about your condition or care  Medicines: You may need any of the following:  · Antibiotics  prevent or treat a bacterial infection  · Prescription pain medicine  may be given  Ask how to take this medicine safely  · A tetanus vaccine  may be needed to prevent tetanus  Tetanus is a life-threatening bacterial infection that affects the nerves and muscles  The bacteria can be spread through animal bites  · A rabies vaccine  may be needed to prevent rabies  Rabies is a life-threatening viral infection  The virus can be spread through animal bites  · Take your medicine as directed  Contact your healthcare provider if you think your medicine is not helping or if you have side effects  Tell him of her if you are allergic to any medicine  Keep a list of the medicines, vitamins, and herbs you take  Include the amounts, and when and why you take them  Bring the list or the pill bottles to follow-up visits  Carry your medicine list with you in case of an emergency    Follow up with your healthcare provider in 1 to 2 days: You may need to return to have your stitches removed  Write down your questions so you remember to ask them during your visits  Self-care:   · Apply antibiotic ointment as directed  This helps prevent infection in minor skin wounds  It is available without a doctor's order  · Keep the wound clean and covered  Wash the wound every day with soap and water or germ-killing cleanser  Ask your healthcare provider about the kinds of bandages to use  · Apply ice on your wound  Ice helps decrease swelling and pain  Ice may also help prevent tissue damage  Use an ice pack, or put crushed ice in a plastic bag  Cover it with a towel and place it on your wound for 15 to 20 minutes every hour or as directed  · Elevate the wound area  Raise your wound above the level of your heart as often as you can  This will help decrease swelling and pain  Prop your wound on pillows or blankets to keep it elevated comfortably  Prevent another animal bite:   · Learn to recognize the signs of a scared or angry pet  Avoid quick, sudden movements  · Do not step between animals that are fighting  · Do not leave a pet alone with a young child  · Do not disturb an animal while it eats, sleeps, or cares for its young  · Do not approach an animal you do not know, especially one that is tied up or caged  · Stay away from animals that seem sick or act strangely  · Do not feed or capture wild animals  © 2017 [address] Information is for End User's use only and may not be sold, redistributed or otherwise used for commercial purposes  All illustrations and images included in CareNotes® are the copyrighted property of [employer]  or Luis Alfredo Shelton  The above information is an  only  It is not intended as medical advice for individual conditions or treatments   Talk to your doctor, nurse or pharmacist before following any medical regimen to see if it is safe and effective for you

## 2017-11-28 ENCOUNTER — ALLSCRIPTS OFFICE VISIT (OUTPATIENT)
Dept: OTHER | Facility: OTHER | Age: 72
End: 2017-11-28

## 2018-01-09 NOTE — RESULT NOTES
Message   Labs do not look bad  cholesterols are elevated  We will discuss all at next visit  Did we get in touch with urology for this Pt? Verified Results  (1) CBC/PLT/DIFF [id number] 10:10AM Salud Shah Order Number: [id number]     Test Name Result Flag Reference   WBC COUNT 4 71 Thousand/uL  4 31-10 16   RBC COUNT 4 44 Million/uL  3 81-5 12   HEMOGLOBIN 13 8 g/dL  11 5-15 4   HEMATOCRIT 41 2 %  34 8-46  1   MCV 93 fL  82-98   MCH 31 1 pg  26 8-34 3   MCHC 33 5 g/dL  31 4-37 4   RDW 14 1 %  11 6-15 1   MPV 9 9 fL  8 9-12 7   PLATELET COUNT 055 Thousands/uL  149-390   nRBC AUTOMATED 0 /100 WBCs     NEUTROPHILS RELATIVE PERCENT 66 %  43-75   LYMPHOCYTES RELATIVE PERCENT 23 %  14-44   MONOCYTES RELATIVE PERCENT 7 %  4-12   EOSINOPHILS RELATIVE PERCENT 3 %  0-6   BASOPHILS RELATIVE PERCENT 1 %  0-1   NEUTROPHILS ABSOLUTE COUNT 3 07 Thousands/?L  1 85-7 62   LYMPHOCYTES ABSOLUTE COUNT 1 09 Thousands/?L  0 60-4 47   MONOCYTES ABSOLUTE COUNT 0 33 Thousand/?L  0 17-1 22   EOSINOPHILS ABSOLUTE COUNT 0 15 Thousand/?L  0 00-0 61   BASOPHILS ABSOLUTE COUNT 0 06 Thousands/?L  0 00-0 10   - Patient Instructions: This bloodwork is non-fasting  Please drink two glasses of water morning of bloodwork  - Patient Instructions: This bloodwork is non-fasting  Please drink two glasses of water morning of bloodwork  (1) COMPREHENSIVE METABOLIC PANEL [id number] 58:71KK Salud Shah Order Number: [id number]     Test Name Result Flag Reference   GLUCOSE,RANDM 97 mg/dL     If the patient is fasting, the ADA then defines impaired fasting glucose as > 100 mg/dL and diabetes as > or equal to 123 mg/dL     SODIUM 141 mmol/L  136-145   POTASSIUM 4 3 mmol/L  3 5-5 3   CHLORIDE 106 mmol/L  100-108   CARBON DIOXIDE 29 mmol/L  21-32   ANION GAP (CALC) 6 mmol/L  4-13   BLOOD UREA NITROGEN 17 mg/dL  5-25   CREATININE 0 87 mg/dL  0 60-1 30   Standardized to IDMS reference method   CALCIUM 9 2 mg/dL 8  3-10 1   BILI, TOTAL 0 29 mg/dL  0 20-1 00   ALK PHOSPHATAS 64 U/L     ALT (SGPT) 21 U/L  12-78   AST(SGOT) 13 U/L  5-45   ALBUMIN 3 5 g/dL  3 5-5 0   TOTAL PROTEIN 7 5 g/dL  6 4-8 2   eGFR Non-African American      >60 0 ml/min/1 73sq m   - Patient Instructions: This is a fasting blood test  Water,black tea or black  coffee only after 9:00pm the night before test Drink 2 glasses of water the morning of test - Patient Instructions: This bloodwork is non-fasting  Please drink two glasses of   water morning of bloodwork  National Kidney Disease Education Program recommendations are as follows:  GFR calculation is accurate only with a steady state creatinine  Chronic Kidney disease less than 60 ml/min/1 73 sq  meters  Kidney failure less than 15 ml/min/1 73 sq  meters  (1) LIPID PANEL, FASTING [id number] 10:10AM [facility] Order Number: [id number]     Test Name Result Flag Reference   CHOLESTEROL 296 mg/dL H    HDL,DIRECT 85 mg/dL H 40-60   Specimen collection should occur prior to Metamizole administration due to the potential for falsely depressed results  LDL CHOLESTEROL CALCULATED 190 mg/dL H 0-100   - Patient Instructions: This is a fasting blood test  Water,black tea or black  coffee only after 9:00pm the night before test   Drink 2 glasses of water the morning of test     - Patient Instructions: This is a fasting blood test  Water,black tea or black  coffee only after 9:00pm the night before test Drink 2 glasses of water the morning of test - Patient Instructions: This bloodwork is non-fasting  Please drink two glasses of   water morning of bloodwork    Triglyceride:         Normal              <150 mg/dl       Borderline High    150-199 mg/dl       High               200-499 mg/dl       Very High          >499 mg/dl  Cholesterol:         Desirable        <200 mg/dl      Borderline High  200-239 mg/dl      High             >239 mg/dl  HDL Cholesterol:        High    >59 mg/dL Low     <41 mg/dL  LDL CALCULATED:    This screening LDL is a calculated result  It does not have the accuracy of the Direct Measured LDL in the monitoring of patients with hyperlipidemia and/or statin therapy  Direct Measure LDL (RYK660) must be ordered separately in these patients  TRIGLYCERIDES 106 mg/dL  <=150   Specimen collection should occur prior to N-Acetylcysteine or Metamizole administration due to the potential for falsely depressed results  (1) TSH [id number] 10:10AM Rahul Mckay Order Number: [id number]     Test Name Result Flag Reference   TSH 1 380 uIU/mL  0 358-3 740   - Patient Instructions: This bloodwork is non-fasting  Please drink two glasses of water morning of bloodwork  - Patient Instructions: This is a fasting blood test  Water,black tea or black  coffee only after 9:00pm the night before test Drink 2 glasses of water the morning of test - Patient Instructions: This bloodwork is non-fasting  Please drink two glasses of   water morning of bloodwork  Patients undergoing fluorescein dye angiography may retain small amounts of fluorescein in the body for 48-72 hours post procedure  Samples containing fluorescein can produce falsely depressed TSH values  If the patient had this procedure,a specimen should be resubmitted post fluorescein clearance  The recommended reference ranges for TSH during pregnancy are as follows:  First trimester 0 1 to 2 5 uIU/mL  Second trimester  0 2 to 3 0 uIU/mL  Third trimester 0 3 to 3 0 uIU/m     (1) T4, FREE [id number] 10:10AM Avalos Koko    Order Number: [id number]     Test Name Result Flag Reference   T4,FREE 1 07 ng/dL  0 76-1 46   - Patient Instructions: This is a fasting blood test  Water,black tea or black  coffee only after 9:00pm the night before test Drink 2 glasses of water the morning of test - Patient Instructions: This bloodwork is non-fasting  Please drink two glasses of   water morning of bloodwork       (1) URINALYSIS w URINE C/S REFLEX (will reflex a microscopy if leukocytes, occult blood, or nitrites are not within normal limits) [id number] 10:10AM [place] Neela Order Number: [id number]     Test Name Result Flag Reference   COLOR Yellow     CLARITY Cloudy     PH UA 6 5  4 5-8 0   LEUKOCYTE ESTERASE UA Moderate A Negative   NITRITE UA Negative  Negative   PROTEIN  (2+) mg/dl A Negative   GLUCOSE UA Negative mg/dl  Negative   KETONES UA Negative mg/dl  Negative   UROBILINOGEN UA 0 2 E U /dl  0 2, 1 0 E U /dl   BILIRUBIN UA Negative  Negative   BLOOD UA Large A Negative   SPECIFIC GRAVITY UA 1 018  1 003-1 030   BACTERIA Occasional /hpf  None Seen, Occasional   EPITHELIAL CELLS Moderate /hpf A None Seen, Occasional   RBC UA Innumerable /hpf A None Seen   WBC UA 30-50 /hpf A None Seen

## 2018-01-11 NOTE — RESULT NOTES
Verified Results  (1) LIPID PANEL, FASTING [id number] 08:11AM Susan Chery    Order Number: [id number]     Test Name Result Flag Reference   CHOLESTEROL 290 mg/dL H    HDL,DIRECT 93 mg/dL H 40-60   Specimen collection should occur prior to Metamizole administration due to the potential for falsely depressed results  LDL CHOLESTEROL CALCULATED 182 mg/dL H 0-100   - Patient Instructions: This is a fasting blood test  Water,black tea or black  coffee only after 9:00pm the night before test   Drink 2 glasses of water the morning of test       Triglyceride:         Normal              <150 mg/dl       Borderline High    150-199 mg/dl       High               200-499 mg/dl       Very High          >499 mg/dl  Cholesterol:         Desirable        <200 mg/dl      Borderline High  200-239 mg/dl      High             >239 mg/dl  HDL Cholesterol:        High    >59 mg/dL      Low     <41 mg/dL  LDL CALCULATED:    This screening LDL is a calculated result  It does not have the accuracy of the Direct Measured LDL in the monitoring of patients with hyperlipidemia and/or statin therapy  Direct Measure LDL (JON751) must be ordered separately in these patients  TRIGLYCERIDES 76 mg/dL  <=150   Specimen collection should occur prior to N-Acetylcysteine or Metamizole administration due to the potential for falsely depressed results

## 2018-01-11 NOTE — MISCELLANEOUS
Signatures   Electronically signed by : Severino Montemayor, [facility]; Nov 28 2017 12:08PM EST                       (Author)

## 2018-01-11 NOTE — RESULT NOTES
Verified Results  (1) COMPREHENSIVE METABOLIC PANEL [id number] 30:13DC Nikolay Devries   TW Order Number: [id number]     Test Name Result Flag Reference   SODIUM 141 mmol/L  136-145   POTASSIUM 4 5 mmol/L  3 5-5 3   CHLORIDE 105 mmol/L  100-108   CARBON DIOXIDE 28 mmol/L  21-32   ANION GAP (CALC) 8 mmol/L  4-13   BLOOD UREA NITROGEN 19 mg/dL  5-25   CREATININE 0 97 mg/dL  0 60-1 30   Standardized to IDMS reference method   CALCIUM 9 1 mg/dL  8 3-10 1   BILI, TOTAL 0 40 mg/dL  0 20-1 00   ALK PHOSPHATAS 58 U/L     ALT (SGPT) 21 U/L  12-78   Specimen collection should occur prior to Sulfasalazine administration due to the potential for falsely depressed results  AST(SGOT) 16 U/L  5-45   Specimen collection should occur prior to Sulfasalazine administration due to the potential for falsely depressed results  ALBUMIN 3 3 g/dL L 3 5-5 0   TOTAL PROTEIN 7 5 g/dL  6 4-8 2   eGFR 59 ml/min/1 73sq [facility] Disease Education Program recommendations are as follows:  GFR calculation is accurate only with a steady state creatinine  Chronic Kidney disease less than 60 ml/min/1 73 sq  meters  Kidney failure less than 15 ml/min/1 73 sq  meters  GLUCOSE FASTING 129 mg/dL H 65-99   Specimen collection should occur prior to Sulfasalazine administration due to the potential for falsely depressed results  Specimen collection should occur prior to Sulfapyridine administration due to the potential for falsely elevated results

## 2018-01-12 VITALS
TEMPERATURE: 98.7 F | HEART RATE: 84 BPM | SYSTOLIC BLOOD PRESSURE: 128 MMHG | DIASTOLIC BLOOD PRESSURE: 82 MMHG | HEIGHT: 66 IN | RESPIRATION RATE: 16 BRPM | WEIGHT: 160.25 LBS | BODY MASS INDEX: 25.75 KG/M2

## 2018-01-12 VITALS
DIASTOLIC BLOOD PRESSURE: 92 MMHG | HEART RATE: 77 BPM | HEIGHT: 65 IN | BODY MASS INDEX: 26.33 KG/M2 | SYSTOLIC BLOOD PRESSURE: 140 MMHG | OXYGEN SATURATION: 98 % | WEIGHT: 158 LBS

## 2018-01-12 NOTE — MISCELLANEOUS
Provider Comments  Provider Comments:   pt was a no show for visit on 11/28/2017      Signatures   Electronically signed by : Brandon Soares, [facility]; Nov 28 2017 12:07PM EST                       (Author)

## 2018-01-13 VITALS
TEMPERATURE: 98.8 F | WEIGHT: 160 LBS | SYSTOLIC BLOOD PRESSURE: 132 MMHG | HEIGHT: 63 IN | OXYGEN SATURATION: 98 % | BODY MASS INDEX: 28.35 KG/M2 | DIASTOLIC BLOOD PRESSURE: 88 MMHG | HEART RATE: 84 BPM

## 2018-01-13 NOTE — RESULT NOTES
Message   Lets now see if urology will see her, please let Pt know we are agressively trying     Verified Results  CT RENAL STONE STUDY ABDOMEN PELVIS WO CONTRAST [id number] 10:31AM Torie Antis     Test Name Result Flag Reference   CT RENAL STONE STUDY ABDOMEN PELVIS WO CONTRAST (Report)     CT ABDOMEN AND PELVIS WITHOUT IV CONTRAST - LOW DOSE RENAL STONE      INDICATION: Bilateral flank pain  History of renal stones  COMPARISON: None  TECHNIQUE: Low dose thin section CT examination of the abdomen and pelvis was performed without intravenous or oral contrast according to a protocol specifically designed to evaluate for urinary tract calculus  Axial, sagittal and coronal reformatted    images were submitted for interpretation  This examination, like all CT scans performed in the [facility], was performed utilizing techniques to minimize radiation dose exposure, including the use of iterative reconstruction and    automated exposure control  Evaluation for pathology in the abdomen and pelvis that is unrelated to urinary tract calculi is limited  FINDINGS:     RIGHT KIDNEY AND URETER:   There is a 24 x 16 mm calculus at the level of the renal pelvis which may be causing intermittent obstruction  There is normal fullness of the right renal collecting system  There is a 3 mm nonobstructing calculus at the lower pole  There is no perinephric stranding  LEFT KIDNEY AND URETER:   No urinary tract calculi  There is an exophytic cyst at the upper pole of the left kidney measuring 3 7 cm  Another probable cyst is noted at the medial mid pole measuring 1 8 cm  No hydronephrosis or hydroureter  No perinephric collection  URINARY BLADDER:   Unremarkable  No significant abnormality in the visualized lung bases  There is a partially visualized moderate size sliding hiatal hernia     Limited low radiation dose noncontrast CT evaluation demonstrates no clinically significant abnormality of liver, spleen, pancreas, or adrenal glands  No calcified gallstones or gallbladder wall thickening noted  No bowel obstruction  No ascites or lymphadenopathy  There are multiple scattered colonic diverticula with no inflammatory changes present to suggest acute diverticulitis  Limited evaluation demonstrates no evidence to suggest acute appendicitis  No acute fracture or destructive osseous lesion is identified  IMPRESSION:     24 x 16 mm calculus within the right renal pelvis with minimal fullness of the right renal collecting system  This large stone may be causing intermittent obstruction  Urologic consultation is suggested  No evidence of acute intra-abdominal abnormality  No free air or free fluid  Partially visualized moderate size sliding hiatal hernia         ##sigslh##sigslh       Workstation performed: [id number]     Signed by:   Justin Gilliam MD   12/8/16

## 2018-01-14 VITALS
SYSTOLIC BLOOD PRESSURE: 132 MMHG | BODY MASS INDEX: 27.82 KG/M2 | WEIGHT: 157 LBS | DIASTOLIC BLOOD PRESSURE: 80 MMHG | HEART RATE: 88 BPM | HEIGHT: 63 IN

## 2018-01-14 VITALS
DIASTOLIC BLOOD PRESSURE: 76 MMHG | HEIGHT: 66 IN | RESPIRATION RATE: 18 BRPM | SYSTOLIC BLOOD PRESSURE: 140 MMHG | BODY MASS INDEX: 25.91 KG/M2 | WEIGHT: 161.2 LBS | HEART RATE: 80 BPM

## 2018-01-15 VITALS
DIASTOLIC BLOOD PRESSURE: 84 MMHG | BODY MASS INDEX: 28 KG/M2 | SYSTOLIC BLOOD PRESSURE: 134 MMHG | HEIGHT: 63 IN | TEMPERATURE: 98.3 F | HEART RATE: 93 BPM | OXYGEN SATURATION: 98 % | WEIGHT: 158.03 LBS

## 2018-01-15 NOTE — PROCEDURES
Assessment    1  Nephrolithiasis (592 0) (N20 0)    Plan  Nephrolithiasis    · * XR ABDOMEN 1 VIEW KUB; Status:Active; Requested ONJ:[id number];    Perform:[facility]; FIK:[id number]; Ordered;  For:Nephrolithiasis; Ordered By:Amarilis Dee MD, [facility]  Nephrology Physician Referral  Consult Only: the expectation is that  the referring provider will communicate back to the patient on treatment options  Evaluation and Treatment: the expectation is that the referred to provider will  communicate back to the patient on treatment options  Status: Active  Requested for:  [id number]   Ordered; For: Nephrolithiasis; Ordered By: Oscar Cuff Performed:  Due: 84PVI4397; Last Updated By: Missy Wagoner; 3/9/2017 3:26:15 PM  Care Summary provided  : Yes    Discussion/Summary  Discussion Summary:   Patient is a spry 77-year-old female with a symptomatic right intrarenal calculus greater than 2 cm  she is now status post percutaneous nephrolithotomy performed on February 15, 2017  Next    She did very well with her procedure  Postoperative imaging obtain much she was in the hospital revealed good fragmentation with a small right lower pole calculus  We will plan for observation and targeted metabolic therapy as management for this  Her ureteral stent was successfully removed today via cystoscopy  Given the significant preoperative stone burden and her postoperative residual calculus I have recommended referral to a team of nephrologist who I recommend for metabolic stone evaluation  She is amenable to this  I will see Ms Cindy Mckenzie back in 3 months time with a KUB for active monitoring of her right lower pole calculus  She was counseled that if she develops any worsening pain to please contact me if she may require secondary ureteroscopic intervention if she develops symptoms        Chief Complaint  Chief Complaint Free Text Note Form: Patient presents for cysto/stent removal s/p Right PCNL 2/15/17 h/o right staghorn calculus, hydronephrosis  History of Present Illness  HPI: Mrs Matt Ibarra returns in follow-up for her right renal calculus greater than 2 cm  She is status post right percutaneous nephrolithotomy and ureteral stent placement on February 15  Next    She tolerated the procedure very well  Good fragmentation was encountered during her procedure  Postoperative imaging revealed a small right lower pole residual calculus  Review of Systems  Complete-Female Urology:   Constitutional: No fever, no chills, feels well, no tiredness, no recent weight gain or weight loss  Respiratory: No complaints of shortness of breath, no wheezing, no cough, no SOB on exertion, no orthopnea, no PND  Cardiovascular: No complaints of slow heart rate, no fast heart rate, no chest pain, no palpitations, no leg claudication, no lower extremity edema  Gastrointestinal: No complaints of abdominal pain, no constipation, no nausea or vomiting, no diarrhea, no bloody stools  Genitourinary: Empty sensation, hematuria, stream quality good, urinary stream starts and stops, but No complaints of dysuria, no incontinence, no pelvic pain, no dysmenorrhea, no vaginal discharge or bleeding, as noted in HPI, no dysuria, no urinary hesitancy, no feelings of urinary urgency and no incontinence    The patient presents with complaints of nocturia (1x)  Musculoskeletal: No complaints of arthralgias, no myalgias, no joint swelling or stiffness, no limb pain or swelling  Integumentary: No complaints of skin rash or lesions, no itching, no skin wounds, no breast pain or lump  Hematologic/Lymphatic: No complaints of swollen glands, no swollen glands in the neck, does not bleed easily, does not bruise easily  Neurological: No complaints of headache, no confusion, no convulsions, no numbness, no dizziness or fainting, no tingling, no limb weakness, no difficulty walking  Active Problems    1   Advance directive discussed with patient (V65 49) (Z71 89)   2  Benign hypertension (401 1) (I10)   3  Diverticulosis (562 10) (K57 90)   4  Hyperlipidemia (272 4) (E78 5)   5  Need for influenza vaccination (V04 81) (Z23)   6  Nephrolithiasis (592 0) (N20 0)   7  Renal lithiasis (592 0) (N20 0)   8  Screening for colon cancer (V76 51) (Z12 11)   9  Screening for genitourinary condition (V81 6) (Z13 89)   10  Screening for malignant neoplasm of breast (V76 10) (Z12 39)    Past Medical History    1  History of pneumonia (V12 61) (Z87 01)  Active Problems And Past Medical History Reviewed: The active problems and past medical history were reviewed and updated today  Surgical History    1  History of Dilation And Curettage  Surgical History Reviewed: The surgical history was reviewed and updated today  Family History  Mother    1  Family history of Colon carcinoma  Father    2  Family history of Alzheimer's disease (V17 2) (Z82 0)  Sister    3  Family history of Cancer of urinary tract  Brother    4  Family history of malignant neoplasm of urinary bladder (V16 52) (Z80 52)  Maternal Grandfather    5  Family history of cerebrovascular accident (CVA) (V17 1) (Z82 3)  Family History    6  Denied: Family history of mental disorder   7  Denied: Family history of substance abuse  Family History Reviewed: The family history was reviewed and updated today  Social History    · Activities: Computers   · Activities: Reading   · Alcohol use (V49 89) (Z78 9)   · Brushes teeth twice a day   · Dental care, regularly   · Five children   · Former smoker (L25 16) (Z87 891)   · Full-time employment   ·    · No drug use  Social History Reviewed: The social history was reviewed and updated today  Current Meds   1  Colace 100 MG Oral Capsule; TAKE 1 CAPSULE TWICE DAILY; Therapy: 32EUF3640 to (Evaluate:19Mar2017)  Requested for: 86YRI3717; Last   Rx:[id number] Ordered   2   Olmesartan Medoxomil 40 MG Oral Tablet; TAKE 1 TABLET DAILY  Requested for:   66KFJ7486; Last Rx:[id number] Ordered  Medication List Reviewed: The medication list was reviewed and updated today  Allergies    1  No Known Drug Allergies    Vitals  Vital Signs    Recorded: 04JJN7501 02:41PM   Heart Rate 88   Systolic 480   Diastolic 80   Height 5 ft 3 in   Weight 157 lb    BMI Calculated 27 81   BSA Calculated 1 74     Physical Exam    Constitutional   General appearance: No acute distress, well appearing and well nourished  Pulmonary   Respiratory effort: No increased work of breathing or signs of respiratory distress  Cardiovascular   Auscultation of heart: Normal rate and rhythm, normal S1 and S2, without murmurs  Examination of extremities for edema and/or varicosities: Normal     Abdomen   Abdomen: Non-tender, no masses  Genitourinary Positive right CVA tenderness  Musculoskeletal   Gait and station: Normal     Skin   Skin and subcutaneous tissue: Normal without rashes or lesions  Neurologic   Cranial nerves: Cranial nerves 2-12 intact  Results/Data  Urine Dip Non-Automated- POC [id number] 02:41PM German Burton     Test Name Result Flag Reference   Color Laina     Clarity Transparent     Leukocytes neg     Nitrite neg     Blood mod     Bilirubin neg     Protein neg     Ph 8 5     Specific Gravity 1 005     Ketone neg     Glucose neg         Procedure      Procedure: Cystoscopy / Stent Removal   After consent was obtained, the patient was placed in the dorsal lithotomy position and prepped in the usual fashion  Flexible cystourethroscopy was performed and the indwelling right ureteral stent was identified  The flexible grasping forceps were then used to remove the stent without difficulty  The patient tolerated the procedure well and without complications         Future Appointments    Date/Time Provider Specialty Site   06/28/2017 02:00 PM German Burton MD Urology [facility] FOR UROLOGY [address] [place] [address]   05/25/2017 10:20 MICHAEL Morris, [facility] Internal Medicine [address] [facility] INTERNAL MED     Signatures   Electronically signed by : Kailyn Cook MD; Mar  9 2017  3:26PM EST                       (Author)

## 2018-05-08 ENCOUNTER — OFFICE VISIT (OUTPATIENT)
Dept: INTERNAL MEDICINE CLINIC | Facility: CLINIC | Age: 73
End: 2018-05-08
Payer: MEDICARE

## 2018-05-08 VITALS
WEIGHT: 160.2 LBS | OXYGEN SATURATION: 98 % | HEIGHT: 63 IN | SYSTOLIC BLOOD PRESSURE: 120 MMHG | BODY MASS INDEX: 28.39 KG/M2 | HEART RATE: 73 BPM | DIASTOLIC BLOOD PRESSURE: 72 MMHG

## 2018-05-08 DIAGNOSIS — R82.992 HYPEROXALURIA: ICD-10-CM

## 2018-05-08 DIAGNOSIS — I10 BENIGN HYPERTENSION: ICD-10-CM

## 2018-05-08 DIAGNOSIS — Z01.818 PRE-OP EXAMINATION: Primary | ICD-10-CM

## 2018-05-08 DIAGNOSIS — N20.0 NEPHROLITHIASIS: ICD-10-CM

## 2018-05-08 DIAGNOSIS — H25.9 SENILE CATARACT OF LEFT EYE, UNSPECIFIED AGE-RELATED CATARACT TYPE: ICD-10-CM

## 2018-05-08 DIAGNOSIS — F32.A DEPRESSION, UNSPECIFIED DEPRESSION TYPE: ICD-10-CM

## 2018-05-08 DIAGNOSIS — Z12.39 SCREENING FOR MALIGNANT NEOPLASM OF BREAST: ICD-10-CM

## 2018-05-08 PROBLEM — K44.9 HIATAL HERNIA: Status: ACTIVE | Noted: 2017-08-22

## 2018-05-08 PROBLEM — J30.9 ALLERGIC RHINITIS: Status: ACTIVE | Noted: 2017-05-25

## 2018-05-08 PROCEDURE — 99214 OFFICE O/P EST MOD 30 MIN: CPT | Performed by: PHYSICIAN ASSISTANT

## 2018-05-08 RX ORDER — OLMESARTAN MEDOXOMIL 40 MG/1
40 TABLET ORAL DAILY
Qty: 90 TABLET | Refills: 1 | Status: SHIPPED | OUTPATIENT
Start: 2018-05-08 | End: 2018-05-09 | Stop reason: SDUPTHER

## 2018-05-08 RX ORDER — POTASSIUM CITRATE 5 MEQ/1
5 TABLET, EXTENDED RELEASE ORAL DAILY
Qty: 90 TABLET | Refills: 1 | Status: SHIPPED | OUTPATIENT
Start: 2018-05-08 | End: 2018-05-09 | Stop reason: SDUPTHER

## 2018-05-08 RX ORDER — POTASSIUM CITRATE 5 MEQ/1
TABLET, EXTENDED RELEASE ORAL
COMMUNITY
Start: 2018-05-07 | End: 2018-05-08 | Stop reason: SDUPTHER

## 2018-05-08 RX ORDER — PAROXETINE 10 MG/1
10 TABLET, FILM COATED ORAL DAILY
Qty: 90 TABLET | Refills: 1 | Status: SHIPPED | OUTPATIENT
Start: 2018-05-08 | End: 2018-05-09 | Stop reason: SDUPTHER

## 2018-05-08 NOTE — PROGRESS NOTES
Assessment/Plan:    Patient was not required to have any labs for this procedure  General medical exam is acceptable  Patient will take her Benicar the morning of surgery with a sip of water  Other medications may be taken after surgery  Patient is medically cleared for proposed procedure  Followup scheduled per orders  Diagnoses and all orders for this visit:    Pre-op examination    Senile cataract of left eye, unspecified age-related cataract type    Hyperoxaluria (HCC)  -     potassium citrate (UROCIT-K) 5 MEQ (540 MG); Take 1 tablet (5 mEq total) by mouth daily    Depression, unspecified depression type  -     PARoxetine (PAXIL) 10 mg tablet; Take 1 tablet (10 mg total) by mouth daily    Benign hypertension  -     olmesartan (BENICAR) 40 mg tablet; Take 1 tablet (40 mg total) by mouth daily  -     Comprehensive metabolic panel; Future  -     Lipid panel; Future    Nephrolithiasis  -     US kidney and bladder; Future  -     CBC and differential; Future    Screening for malignant neoplasm of breast  -     Mammo screening bilateral w cad; Future    Other orders  -     Discontinue: potassium citrate (UROCIT-K) 5 MEQ (540 MG); Subjective:      Patient ID: Gonzalo Castro is a 67 y o  female  42-year-old white female presents for preop medical evaluation for upcoming left cataract surgery  This is scheduled for 5/22/18 by Dr Reyna Enciso  Patient has noted progressive vision loss in both eyes over the past year  Hypertension:  Controlled on current medication  Michelle cp, palp, sob, edema, HA, dizziness, diaphoresis, syncope, visual disturbance  Patient reports she is currently under significant stress in that 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  She is not sure whether any new owner would keep the staff that is currently there  She notes she is not sleeping well and is feeling tired most of the time    In her distant past she had been on Paxil for short course she believes 3-6 months, and found that very effective  She would like to try the medication again          ALLERGIES:  Allergies   Allergen Reactions    Other      "Jordanian food"    Seasonal Ic  [Cholestatin]        CURRENT MEDICATIONS:    Current Outpatient Prescriptions:     HYDROcodone-acetaminophen (NORCO) 5-325 mg per tablet, Take 1 tablet by mouth every 6 (six) hours as needed (Not relieved by Anti-inflammatory) for up to 12 doses Max Daily Amount: 4 tablets, Disp: 12 tablet, Rfl: 0    olmesartan (BENICAR) 40 mg tablet, Take 1 tablet (40 mg total) by mouth daily, Disp: 90 tablet, Rfl: 1    potassium citrate (UROCIT-K) 5 MEQ (540 MG), Take 1 tablet (5 mEq total) by mouth daily, Disp: 90 tablet, Rfl: 1    PARoxetine (PAXIL) 10 mg tablet, Take 1 tablet (10 mg total) by mouth daily, Disp: 90 tablet, Rfl: 1    ACTIVE PROBLEM LIST:  Patient Active Problem List   Diagnosis    Nephrolithiasis    Allergic rhinitis    Benign hypertension    Diverticulosis    Hiatal hernia    Hyperlipidemia    Hyperoxaluria (HCC)    Pre-op examination    Age-related cataract of left eye    Depression       PAST MEDICAL HISTORY:  Past Medical History:   Diagnosis Date    Hypertension     Kidney stones     Pneumonia        PAST SURGICAL HISTORY:  Past Surgical History:   Procedure Laterality Date    DILATION AND CURETTAGE OF UTERUS      WA CYSTO/URETERO/PYELOSCOPY, DX Right 2/15/2017    Procedure: URETEROSCOPY, STENT PLACEMENT ;  Surgeon: Chano Lees MD;  Location: [facility];  Service: Urology    WA PERCUT [address] [facility] [address] KID STONE,2+ CM Right 2/15/2017    Procedure: ACCESS INTERPOLAR CALYX, INSERTED TWO WIRES INTO BLADDER, NEPHROLITHOTOMY  PERCUTANEOUS ;  Surgeon: Chano Lees MD;  Location: [facility];  Service: Urology       FAMILY HISTORY:  Family History   Problem Relation Age of Onset    Cancer Mother     Colon cancer Mother      CARCINOMA     Alzheimer's disease Father     Urinary tract infection Sister     Cancer Brother      BLADDER    Stroke Maternal Grandfather      CVA       SOCIAL HISTORY:  Social History     Social History    Marital status: /Civil Union     Spouse name: N/A    Number of children: 5    Years of education: N/A     Occupational History    SELLS TIME SHARES AT [employer]       Social History Main Topics    Smoking status: Former Smoker     Packs/day: 1 00     Years: 20 00    Smokeless tobacco: Former User     Quit date: 2007      Comment: smokes "very rarely"    Alcohol use Yes      Comment: social <2 GLASSES OF WINE PER WEEK     Drug use: No    Sexual activity: Not on file     Other Topics Concern    Not on file     Social History Narrative    ACTIVITIES -- COMPUTERS, READING     [address]           Review of Systems   Constitutional: Negative for activity change, chills, fatigue and fever  HENT: Negative for congestion  Eyes: Negative for discharge  Respiratory: Negative for cough, chest tightness and shortness of breath  Cardiovascular: Negative for chest pain, palpitations and leg swelling  Gastrointestinal: Negative for abdominal pain  Genitourinary: Negative for difficulty urinating, dysuria, hematuria and urgency  Musculoskeletal: Negative for arthralgias and myalgias  Skin: Negative for rash  Allergic/Immunologic: Negative for immunocompromised state  Neurological: Negative for dizziness, syncope, weakness, light-headedness and headaches  Hematological: Negative for adenopathy  Does not bruise/bleed easily  Psychiatric/Behavioral: Negative for dysphoric mood  The patient is not nervous/anxious            Objective:  Vitals:    05/08/18 1324   BP: 120/72   BP Location: Right arm   Patient Position: Sitting   Pulse: 73   SpO2: 98%   Weight: 72 7 kg (160 lb 3 2 oz)   Height: 5' 3" (1 6 m)        Physical Exam   Constitutional: She is oriented to person, place, and time  She appears well-developed and well-nourished  HENT:   Right Ear: External ear normal    Left Ear: External ear normal    Mouth/Throat: Oropharynx is clear and moist    Mild nasal septal deviation to the left   Eyes: Conjunctivae and EOM are normal  Pupils are equal, round, and reactive to light  Neck: Neck supple  No JVD present  Carotid bruit is not present  No thyromegaly present  Cardiovascular: Normal rate, regular rhythm and normal heart sounds  Pulmonary/Chest: Effort normal and breath sounds normal  She has no wheezes  Abdominal: Soft  Bowel sounds are normal  There is no tenderness  There is no guarding  Musculoskeletal: She exhibits no edema  Lymphadenopathy:     She has no cervical adenopathy  Neurological: She is alert and oriented to person, place, and time  She has normal reflexes  She displays normal reflexes  No cranial nerve deficit  She exhibits normal muscle tone  Coordination normal    Skin: Skin is warm and dry  No rash noted  Psychiatric: She has a normal mood and affect  Her behavior is normal  Judgment and thought content normal    Nursing note and vitals reviewed  RESULTS:    No results found for this or any previous visit (from the past 1008 hour(s))  This note was created with voice recognition software  Phonic, grammatical and spelling errors may be present within the note as a result

## 2018-05-09 ENCOUNTER — TELEPHONE (OUTPATIENT)
Dept: INTERNAL MEDICINE CLINIC | Facility: CLINIC | Age: 73
End: 2018-05-09

## 2018-05-09 DIAGNOSIS — R82.992 HYPEROXALURIA: ICD-10-CM

## 2018-05-09 DIAGNOSIS — F32.A DEPRESSION, UNSPECIFIED DEPRESSION TYPE: ICD-10-CM

## 2018-05-09 DIAGNOSIS — I10 BENIGN HYPERTENSION: ICD-10-CM

## 2018-05-09 RX ORDER — POTASSIUM CITRATE 5 MEQ/1
5 TABLET, EXTENDED RELEASE ORAL DAILY
Qty: 90 TABLET | Refills: 1 | Status: SHIPPED | OUTPATIENT
Start: 2018-05-09 | End: 2018-11-08 | Stop reason: ALTCHOICE

## 2018-05-09 RX ORDER — OLMESARTAN MEDOXOMIL 40 MG/1
40 TABLET ORAL DAILY
Qty: 90 TABLET | Refills: 1 | Status: SHIPPED | OUTPATIENT
Start: 2018-05-09 | End: 2019-02-26 | Stop reason: SDUPTHER

## 2018-05-09 RX ORDER — PAROXETINE 10 MG/1
10 TABLET, FILM COATED ORAL DAILY
Qty: 90 TABLET | Refills: 1 | Status: SHIPPED | OUTPATIENT
Start: 2018-05-09 | End: 2018-07-05 | Stop reason: SDUPTHER

## 2018-05-09 NOTE — TELEPHONE ENCOUNTER
Patient needed her prescriptions from yesterday to go [facility]  Can you please resend the ones from yesterday to the [facility]   Thanks

## 2018-05-16 ENCOUNTER — HOSPITAL ENCOUNTER (OUTPATIENT)
Dept: ULTRASOUND IMAGING | Facility: HOSPITAL | Age: 73
Discharge: HOME | End: 2018-05-16
Payer: MEDICARE

## 2018-05-16 ENCOUNTER — HOSPITAL ENCOUNTER (OUTPATIENT)
Dept: MAMMOGRAPHY | Facility: CLINIC | Age: 73
Discharge: HOME | End: 2018-05-16
Payer: MEDICARE

## 2018-05-16 DIAGNOSIS — N20.0 NEPHROLITHIASIS: ICD-10-CM

## 2018-05-16 DIAGNOSIS — Z12.39 SCREENING FOR MALIGNANT NEOPLASM OF BREAST: ICD-10-CM

## 2018-05-16 PROCEDURE — 76770 US EXAM ABDO BACK WALL COMP: CPT

## 2018-05-16 PROCEDURE — 77067 SCR MAMMO BI INCL CAD: CPT

## 2018-06-07 ENCOUNTER — TELEPHONE (OUTPATIENT)
Dept: INTERNAL MEDICINE CLINIC | Facility: CLINIC | Age: 73
End: 2018-06-07

## 2018-06-07 ENCOUNTER — TRANSCRIBE ORDERS (OUTPATIENT)
Dept: ADMINISTRATIVE | Facility: HOSPITAL | Age: 73
End: 2018-06-07

## 2018-06-07 DIAGNOSIS — N20.0 KIDNEY STONES: Primary | ICD-10-CM

## 2018-06-07 DIAGNOSIS — N20.0 KIDNEY STONE: ICD-10-CM

## 2018-06-07 NOTE — TELEPHONE ENCOUNTER
Luis Shepard,    Patient called and asked if  She could please have faxed to Dr Janny Simeon office fax [phone] Comprehensive metabolic panel results dated 9/27/18 and also the Discharge surgery notes from the hospital surgery dated 2/16/17 By Dr Agata Masterson  Can I get your ok please and let me know if there is anything you would like me to fax as well  Thanks in advance!

## 2018-06-07 NOTE — TELEPHONE ENCOUNTER
Faxed over to Dr Connor Rodriguez [phone] Comprehensive metabolic pannel dated 2/85/23 and Hospital  Discharge Surgery  summary -Urology dated 2/16/17   Total pages faxed 35

## 2018-06-14 ENCOUNTER — HOSPITAL ENCOUNTER (OUTPATIENT)
Dept: CT IMAGING | Facility: HOSPITAL | Age: 73
Discharge: HOME | End: 2018-06-14
Payer: MEDICARE

## 2018-06-14 ENCOUNTER — HOSPITAL ENCOUNTER (OUTPATIENT)
Dept: RADIOLOGY | Facility: HOSPITAL | Age: 73
Discharge: HOME | End: 2018-06-14
Payer: MEDICARE

## 2018-06-14 DIAGNOSIS — N20.0 KIDNEY STONES: ICD-10-CM

## 2018-06-14 PROCEDURE — 74176 CT ABD & PELVIS W/O CONTRAST: CPT

## 2018-06-14 PROCEDURE — 74018 RADEX ABDOMEN 1 VIEW: CPT

## 2018-07-05 DIAGNOSIS — F32.A DEPRESSION, UNSPECIFIED DEPRESSION TYPE: ICD-10-CM

## 2018-07-05 NOTE — TELEPHONE ENCOUNTER
The dose changed to what?  20 mg one daily or 10 mg one daily  Please make your messages more specific, that way we do not have to keep sending messages back and forth

## 2018-07-06 RX ORDER — PAROXETINE 10 MG/1
20 TABLET, FILM COATED ORAL DAILY
Qty: 90 TABLET | Refills: 0 | Status: SHIPPED | OUTPATIENT
Start: 2018-07-06 | End: 2018-08-21 | Stop reason: SDUPTHER

## 2018-07-06 NOTE — TELEPHONE ENCOUNTER
She wants to take a total of 20mg  She used to take 10mg, it wasn't working so she started taking two 10mg tablets for a total of 20mg  I put that message in just to let you know that I changed it to 20mg because it wasn't that dosage before

## 2018-08-21 DIAGNOSIS — F32.A DEPRESSION, UNSPECIFIED DEPRESSION TYPE: ICD-10-CM

## 2018-08-21 RX ORDER — PAROXETINE 10 MG/1
TABLET, FILM COATED ORAL
Qty: 90 TABLET | Refills: 0 | Status: SHIPPED | OUTPATIENT
Start: 2018-08-21 | End: 2018-09-13 | Stop reason: ALTCHOICE

## 2018-09-12 ENCOUNTER — APPOINTMENT (OUTPATIENT)
Dept: LAB | Facility: HOSPITAL | Age: 73
End: 2018-09-12
Payer: MEDICARE

## 2018-09-12 DIAGNOSIS — N20.0 NEPHROLITHIASIS: ICD-10-CM

## 2018-09-12 DIAGNOSIS — I10 BENIGN HYPERTENSION: ICD-10-CM

## 2018-09-12 LAB
ALBUMIN SERPL BCP-MCNC: 3.4 G/DL (ref 3.5–5)
ALP SERPL-CCNC: 61 U/L (ref 46–116)
ALT SERPL W P-5'-P-CCNC: 25 U/L (ref 12–78)
ANION GAP SERPL CALCULATED.3IONS-SCNC: 8 MMOL/L (ref 4–13)
AST SERPL W P-5'-P-CCNC: 15 U/L (ref 5–45)
BASOPHILS # BLD AUTO: 0.05 THOUSANDS/ΜL (ref 0–0.1)
BASOPHILS NFR BLD AUTO: 1 % (ref 0–1)
BILIRUB SERPL-MCNC: 0.4 MG/DL (ref 0.2–1)
BUN SERPL-MCNC: 16 MG/DL (ref 5–25)
CALCIUM SERPL-MCNC: 9 MG/DL (ref 8.3–10.1)
CHLORIDE SERPL-SCNC: 103 MMOL/L (ref 100–108)
CHOLEST SERPL-MCNC: 302 MG/DL (ref 50–200)
CO2 SERPL-SCNC: 28 MMOL/L (ref 21–32)
CREAT SERPL-MCNC: 0.99 MG/DL (ref 0.6–1.3)
EOSINOPHIL # BLD AUTO: 0.23 THOUSAND/ΜL (ref 0–0.61)
EOSINOPHIL NFR BLD AUTO: 4 % (ref 0–6)
ERYTHROCYTE [DISTWIDTH] IN BLOOD BY AUTOMATED COUNT: 13.2 % (ref 11.6–15.1)
GFR SERPL CREATININE-BSD FRML MDRD: 57 ML/MIN/1.73SQ M
GLUCOSE P FAST SERPL-MCNC: 131 MG/DL (ref 65–99)
HCT VFR BLD AUTO: 43.5 % (ref 34.8–46.1)
HDLC SERPL-MCNC: 96 MG/DL (ref 40–60)
HGB BLD-MCNC: 14.6 G/DL (ref 11.5–15.4)
IMM GRANULOCYTES # BLD AUTO: 0.02 THOUSAND/UL (ref 0–0.2)
IMM GRANULOCYTES NFR BLD AUTO: 0 % (ref 0–2)
LDLC SERPL CALC-MCNC: 188 MG/DL (ref 0–100)
LYMPHOCYTES # BLD AUTO: 1.39 THOUSANDS/ΜL (ref 0.6–4.47)
LYMPHOCYTES NFR BLD AUTO: 25 % (ref 14–44)
MCH RBC QN AUTO: 32.9 PG (ref 26.8–34.3)
MCHC RBC AUTO-ENTMCNC: 33.6 G/DL (ref 31.4–37.4)
MCV RBC AUTO: 98 FL (ref 82–98)
MONOCYTES # BLD AUTO: 0.28 THOUSAND/ΜL (ref 0.17–1.22)
MONOCYTES NFR BLD AUTO: 5 % (ref 4–12)
NEUTROPHILS # BLD AUTO: 3.53 THOUSANDS/ΜL (ref 1.85–7.62)
NEUTS SEG NFR BLD AUTO: 65 % (ref 43–75)
NONHDLC SERPL-MCNC: 206 MG/DL
NRBC BLD AUTO-RTO: 0 /100 WBCS
PLATELET # BLD AUTO: 280 THOUSANDS/UL (ref 149–390)
PMV BLD AUTO: 10 FL (ref 8.9–12.7)
POTASSIUM SERPL-SCNC: 4.2 MMOL/L (ref 3.5–5.3)
PROT SERPL-MCNC: 7.5 G/DL (ref 6.4–8.2)
RBC # BLD AUTO: 4.44 MILLION/UL (ref 3.81–5.12)
SODIUM SERPL-SCNC: 139 MMOL/L (ref 136–145)
TRIGL SERPL-MCNC: 89 MG/DL
WBC # BLD AUTO: 5.5 THOUSAND/UL (ref 4.31–10.16)

## 2018-09-12 PROCEDURE — 80061 LIPID PANEL: CPT

## 2018-09-12 PROCEDURE — 80053 COMPREHEN METABOLIC PANEL: CPT

## 2018-09-12 PROCEDURE — 36415 COLL VENOUS BLD VENIPUNCTURE: CPT

## 2018-09-12 PROCEDURE — 85025 COMPLETE CBC W/AUTO DIFF WBC: CPT

## 2018-09-13 ENCOUNTER — OFFICE VISIT (OUTPATIENT)
Dept: INTERNAL MEDICINE CLINIC | Facility: CLINIC | Age: 73
End: 2018-09-13
Payer: MEDICARE

## 2018-09-13 VITALS
OXYGEN SATURATION: 98 % | BODY MASS INDEX: 29.06 KG/M2 | DIASTOLIC BLOOD PRESSURE: 82 MMHG | HEIGHT: 63 IN | HEART RATE: 97 BPM | SYSTOLIC BLOOD PRESSURE: 128 MMHG | WEIGHT: 164 LBS

## 2018-09-13 DIAGNOSIS — I10 BENIGN HYPERTENSION: ICD-10-CM

## 2018-09-13 DIAGNOSIS — Z23 NEED FOR INFLUENZA VACCINATION: ICD-10-CM

## 2018-09-13 DIAGNOSIS — R73.01 IMPAIRED FASTING GLUCOSE: ICD-10-CM

## 2018-09-13 DIAGNOSIS — E78.2 MIXED HYPERLIPIDEMIA: Primary | ICD-10-CM

## 2018-09-13 PROBLEM — Z01.818 PRE-OP EXAMINATION: Status: RESOLVED | Noted: 2018-05-08 | Resolved: 2018-09-13

## 2018-09-13 LAB — SL AMB POCT HEMOGLOBIN AIC: 5.8

## 2018-09-13 PROCEDURE — 83036 HEMOGLOBIN GLYCOSYLATED A1C: CPT | Performed by: PHYSICIAN ASSISTANT

## 2018-09-13 PROCEDURE — 99214 OFFICE O/P EST MOD 30 MIN: CPT | Performed by: PHYSICIAN ASSISTANT

## 2018-09-13 PROCEDURE — G0008 ADMIN INFLUENZA VIRUS VAC: HCPCS

## 2018-09-13 PROCEDURE — 90662 IIV NO PRSV INCREASED AG IM: CPT

## 2018-09-13 NOTE — PATIENT INSTRUCTIONS
We are going to continue current medications  Patient will try her best to cut down her sweet intake  Will then recheck labs in 3 months with follow-up visit

## 2018-09-13 NOTE — PROGRESS NOTES
Assessment/Plan:      Patient Instructions   We are going to continue current medications  Patient will try her best to cut down her sweet intake  Will then recheck labs in 3 months with follow-up visit  No Follow-up on file  Diagnoses and all orders for this visit:    Mixed hyperlipidemia  -     Comprehensive metabolic panel; Future  -     Lipid panel; Future    Benign hypertension    Impaired fasting glucose  -     POCT hemoglobin A1c  -     Hemoglobin A1C; Future    Need for influenza vaccination  -     influenza vaccine, [id number], high-dose, PF 0 5 mL, for patients 65 yr+ (FLUZONE HIGH-DOSE)          Subjective:      Patient ID: Livia Mcburney is a 67 y o  female  Follow-up    Hypertension:  Controlled on her dose of Benicar  Michelle cp, palp, sob, edema, HA, dizziness, diaphoresis, syncope, visual disturbance  Hyperlipidemia:  Total cholesterol and LDL remain elevated  She has very high HDL  We had tried atorvastatin in the past which according the patient cause severe muscle aches and she stopped it  Impaired fasting glucose:    Patient states she eats does are every night and admits to a sweet tooth  She states she would try to modify this but is not sure she would be successful  A1c done in the office today is 5 8  Long discussion held and encouragement given to cut down and try to reduce completely her concentrated sweets          ALLERGIES:  Allergies   Allergen Reactions    Other      "Swiss food"    Seasonal Ic  [Cholestatin]        CURRENT MEDICATIONS:    Current Outpatient Prescriptions:     HYDROcodone-acetaminophen (NORCO) 5-325 mg per tablet, Take 1 tablet by mouth every 6 (six) hours as needed (Not relieved by Anti-inflammatory) for up to 12 doses Max Daily Amount: 4 tablets, Disp: 12 tablet, Rfl: 0    olmesartan (BENICAR) 40 mg tablet, Take 1 tablet (40 mg total) by mouth daily, Disp: 90 tablet, Rfl: 1    potassium citrate (UROCIT-K) 5 MEQ (540 MG), Take 1 tablet (5 mEq total) by mouth daily, Disp: 90 tablet, Rfl: 1    ACTIVE PROBLEM LIST:  Patient Active Problem List   Diagnosis    Nephrolithiasis    Allergic rhinitis    Benign hypertension    Diverticulosis    Hiatal hernia    Hyperlipidemia    Hyperoxaluria (HCC)    Age-related cataract of left eye    Depression    Impaired fasting glucose       PAST MEDICAL HISTORY:  Past Medical History:   Diagnosis Date    Hypertension     Kidney stones     Pneumonia        PAST SURGICAL HISTORY:  Past Surgical History:   Procedure Laterality Date    DILATION AND CURETTAGE OF UTERUS      [place] CYSTO/URETERO/PYELOSCOPY, DX Right 2/15/2017    Procedure: URETEROSCOPY, STENT PLACEMENT ;  Surgeon: Nikki Schwartz MD;  [facility];  Service: Urology    [place] PERCUT [address] KID STONE,2+ CM Right 2/15/2017    Procedure: ACCESS INTERPOLAR CALYX, INSERTED TWO WIRES INTO BLADDER, NEPHROLITHOTOMY  PERCUTANEOUS ;  Surgeon: Nikki Schwartz MD;  [facility];  Service: Urology       FAMILY HISTORY:  Family History   Problem Relation Age of Onset    Cancer Mother     Colon cancer Mother         CARCINOMA     Alzheimer's disease Father     Urinary tract infection Sister     Cancer Brother         BLADDER    Stroke Maternal Grandfather         CVA       SOCIAL HISTORY:  Social History     Social History    Marital status: /Civil Union     Spouse name: N/A    Number of children: 11    Years of education: N/A     Occupational History    SELLS TIME SHARES AT [employer]       Social History Main Topics    Smoking status: Former Smoker     Packs/day: 1 00     Years: 20 00    Smokeless tobacco: Former User     Quit date: 2007      Comment: smokes "very rarely"    Alcohol use Yes      Comment: social <2 [address] [facility] [address] Drug use: No    Sexual activity: Not on file     Other Topics Concern    Not on file     Social History Narrative    ACTIVITIES -- COMPUTERS, READING BRUSHES TEETH TWICE A DAY     DAILY CAFFEINE CONSUMPTION 1 SERVING A DAY     DENTAL CARE REGULARLY           Review of Systems   Constitutional: Negative for activity change, chills, fatigue and fever  HENT: Negative for congestion  Eyes: Negative for discharge  Respiratory: Negative for cough, chest tightness and shortness of breath  Cardiovascular: Negative for chest pain, palpitations and leg swelling  Gastrointestinal: Negative for abdominal pain  Genitourinary: Negative for difficulty urinating  Musculoskeletal: Negative for arthralgias and myalgias  Skin: Negative for rash  Allergic/Immunologic: Negative for immunocompromised state  Neurological: Negative for dizziness, syncope, weakness, light-headedness and headaches  Hematological: Negative for adenopathy  Does not bruise/bleed easily  Psychiatric/Behavioral: Negative for dysphoric mood  The patient is not nervous/anxious  Objective:  Vitals:    09/13/18 1032   BP: 128/82   BP Location: Left arm   Patient Position: Sitting   Pulse: 97   SpO2: 98%   Weight: 74 4 kg (164 lb)   Height: 5' 3" (1 6 m)        Physical Exam   Constitutional: She is oriented to person, place, and time  She appears well-developed and well-nourished  No distress  Neck: Neck supple  Carotid bruit is not present  No thyromegaly present  Cardiovascular: Normal rate, regular rhythm and normal heart sounds  Pulmonary/Chest: Effort normal and breath sounds normal    Abdominal: Soft  There is no tenderness  Lymphadenopathy:     She has no cervical adenopathy  Neurological: She is alert and oriented to person, place, and time  Skin: Skin is warm and dry  No rash noted  Psychiatric: She has a normal mood and affect  Her behavior is normal    Nursing note and vitals reviewed          RESULTS:    Recent Results (from the past 1008 hour(s))   CBC and differential    Collection Time: 09/12/18  9:43 AM   Result Value Ref Range    WBC 5 50 4 31 - 10 16 Thousand/uL    RBC 4 44 3 81 - 5 12 Million/uL    Hemoglobin 14 6 11 5 - 15 4 g/dL    Hematocrit 43 5 34 8 - 46 1 %    MCV 98 82 - 98 fL    MCH 32 9 26 8 - 34 3 pg    MCHC 33 6 31 4 - 37 4 g/dL    RDW 13 2 11 6 - 15 1 %    MPV 10 0 8 9 - 12 7 fL    Platelets 306 677 - 758 Thousands/uL    nRBC 0 /100 WBCs    Neutrophils Relative 65 43 - 75 %    Immat GRANS % 0 0 - 2 %    Lymphocytes Relative 25 14 - 44 %    Monocytes Relative 5 4 - 12 %    Eosinophils Relative 4 0 - 6 %    Basophils Relative 1 0 - 1 %    Neutrophils Absolute 3 53 1 85 - 7 62 Thousands/µL    Immature Grans Absolute 0 02 0 00 - 0 20 Thousand/uL    Lymphocytes Absolute 1 39 0 60 - 4 47 Thousands/µL    Monocytes Absolute 0 28 0 17 - 1 22 Thousand/µL    Eosinophils Absolute 0 23 0 00 - 0 61 Thousand/µL    Basophils Absolute 0 05 0 00 - 0 10 Thousands/µL   Comprehensive metabolic panel    Collection Time: 09/12/18  9:43 AM   Result Value Ref Range    Sodium 139 136 - 145 mmol/L    Potassium 4 2 3 5 - 5 3 mmol/L    Chloride 103 100 - 108 mmol/L    CO2 28 21 - 32 mmol/L    ANION GAP 8 4 - 13 mmol/L    BUN 16 5 - 25 mg/dL    Creatinine 0 99 0 60 - 1 30 mg/dL    Glucose, Fasting 131 (H) 65 - 99 mg/dL    Calcium 9 0 8 3 - 10 1 mg/dL    AST 15 5 - 45 U/L    ALT 25 12 - 78 U/L    Alkaline Phosphatase 61 46 - 116 U/L    Total Protein 7 5 6 4 - 8 2 g/dL    Albumin 3 4 (L) 3 5 - 5 0 g/dL    Total Bilirubin 0 40 0 20 - 1 00 mg/dL    eGFR 57 ml/min/1 73sq m   Lipid panel    Collection Time: 09/12/18  9:43 AM   Result Value Ref Range    Cholesterol 302 (H) 50 - 200 mg/dL    Triglycerides 89 <=150 mg/dL    HDL, Direct 96 (H) 40 - 60 mg/dL    LDL Calculated 188 (H) 0 - 100 mg/dL    Non-HDL-Chol (CHOL-HDL) 206 mg/dl       This note was created with voice recognition software  Phonic, grammatical and spelling errors may be present within the note as a result

## 2018-10-23 DIAGNOSIS — F32.A DEPRESSION, UNSPECIFIED DEPRESSION TYPE: ICD-10-CM

## 2018-10-25 RX ORDER — PAROXETINE 10 MG/1
TABLET, FILM COATED ORAL
Qty: 90 TABLET | Refills: 0 | OUTPATIENT
Start: 2018-10-25

## 2018-10-25 NOTE — TELEPHONE ENCOUNTER
Pt states that she is no longer taking this medication, called the pharmacy as well to DC on her med list

## 2018-10-29 DIAGNOSIS — K57.92 DIVERTICULITIS: Primary | ICD-10-CM

## 2018-10-29 RX ORDER — CIPROFLOXACIN 500 MG/1
1 TABLET, FILM COATED ORAL 2 TIMES DAILY
COMMUNITY
Start: 2017-08-30 | End: 2018-10-29 | Stop reason: SDUPTHER

## 2018-10-29 RX ORDER — METRONIDAZOLE 500 MG/1
TABLET ORAL
COMMUNITY
Start: 2017-05-25 | End: 2018-10-29 | Stop reason: SDUPTHER

## 2018-10-30 PROBLEM — K57.92 DIVERTICULITIS: Status: ACTIVE | Noted: 2018-10-30

## 2018-10-30 RX ORDER — CIPROFLOXACIN 500 MG/1
500 TABLET, FILM COATED ORAL 2 TIMES DAILY
Qty: 20 TABLET | Refills: 0 | Status: SHIPPED | OUTPATIENT
Start: 2018-10-30 | End: 2018-11-08 | Stop reason: ALTCHOICE

## 2018-10-30 RX ORDER — METRONIDAZOLE 500 MG/1
500 TABLET ORAL EVERY 8 HOURS SCHEDULED
Qty: 30 TABLET | Refills: 0 | Status: SHIPPED | OUTPATIENT
Start: 2018-10-30 | End: 2018-11-09

## 2018-10-30 NOTE — TELEPHONE ENCOUNTER
Patient called back and said taht she has diverticulitsis flare up and was told that she could just call in and get medications refilled  Patient feels like she is starting with a flare up  Please advise        [facility]

## 2018-11-08 ENCOUNTER — HOSPITAL ENCOUNTER (OUTPATIENT)
Dept: RADIOLOGY | Facility: HOSPITAL | Age: 73
Discharge: HOME | End: 2018-11-08
Attending: INTERNAL MEDICINE
Payer: MEDICARE

## 2018-11-08 ENCOUNTER — OFFICE VISIT (OUTPATIENT)
Dept: INTERNAL MEDICINE CLINIC | Facility: CLINIC | Age: 73
End: 2018-11-08
Payer: MEDICARE

## 2018-11-08 DIAGNOSIS — M25.512 ACUTE PAIN OF LEFT SHOULDER: Primary | ICD-10-CM

## 2018-11-08 DIAGNOSIS — M25.512 ACUTE PAIN OF LEFT SHOULDER: ICD-10-CM

## 2018-11-08 PROCEDURE — 99213 OFFICE O/P EST LOW 20 MIN: CPT | Performed by: INTERNAL MEDICINE

## 2018-11-08 PROCEDURE — 73030 X-RAY EXAM OF SHOULDER: CPT

## 2018-11-08 RX ORDER — ASCORBIC ACID 1000 MG
1 TABLET ORAL DAILY
COMMUNITY

## 2018-11-08 RX ORDER — MELATONIN
1 DAILY
COMMUNITY
End: 2021-07-07 | Stop reason: ALTCHOICE

## 2018-11-08 RX ORDER — B-COMPLEX WITH VITAMIN C
1 TABLET ORAL DAILY
COMMUNITY
End: 2021-07-07 | Stop reason: ALTCHOICE

## 2018-11-08 RX ORDER — OLMESARTAN MEDOXOMIL 40 MG/1
1 TABLET ORAL DAILY
COMMUNITY
End: 2019-01-08 | Stop reason: SDUPTHER

## 2018-11-08 RX ORDER — METHYLPREDNISOLONE 4 MG/1
TABLET ORAL
Qty: 21 TABLET | Refills: 0 | Status: SHIPPED | OUTPATIENT
Start: 2018-11-08 | End: 2018-11-15

## 2018-11-08 RX ORDER — RIBOFLAVIN (VITAMIN B2) 100 MG
1 TABLET ORAL DAILY
COMMUNITY

## 2018-11-08 NOTE — PROGRESS NOTES
Assessment/Plan:      Her symptoms appear to be localizing to her supraspinatus tendon  Will order an x-ray to see if she has calcium deposits  Will try her with a Medrol Dosepak to see if that improved her symptoms  Reassess this at follow-up  Return in about 1 week (around 11/15/2018)  No problem-specific Assessment & Plan notes found for this encounter  Diagnoses and all orders for this visit:    Acute pain of left shoulder  -     XR shoulder 2+ vw left; Future  -     Methylprednisolone 4 MG TBPK; Use as directed on package    Other orders  -     Coenzyme Q10 (CO Q 10) 10 MG CAPS; Take 1 capsule by mouth daily  -     Multiple Vitamins-Minerals (EYE VITAMINS & MINERALS PO); Take by mouth  -     Omega-3 Fatty Acids (FISH OIL) 645 MG CAPS; Take 1 capsule by mouth daily  -     Multiple Vitamins-Minerals (MULTIVITAMIN ADULT PO); Take 1 tablet by mouth daily  -     B Complex Vitamins (VITAMIN B COMPLEX) TABS; Take 1 tablet by mouth daily  -     Ascorbic Acid (VITAMIN C) 100 MG tablet; Take 1 tablet by mouth daily  -     cholecalciferol (VITAMIN D3) 1,000 units tablet; Take 1 tablet by mouth daily  -     olmesartan (BENICAR) 40 mg tablet; Take 1 tablet by mouth daily          Subjective:      Patient ID: Edis Squires is a 68 y o  female  Patient comes in today complaining of left shoulder pain  She states it looks like she got a reaction to the flu shot because her arm became red and swollen  That went away but then she started to feel pain in the left upper arm and then at 1 point paresthesias down the entire arm  The paresthesias have gone away but she still has the pain in the left arm  She has difficulty putting her coat on  She has difficulty reaching back to unhook her bra          ALLERGIES:  Allergies   Allergen Reactions    Other      "E[place]ian food"    Seasonal Ic  [Cholestatin]        CURRENT MEDICATIONS:    Current Outpatient Prescriptions:     Ascorbic Acid (VITAMIN C) 100 MG tablet, Take 1 tablet by mouth daily, Disp: , Rfl:     cholecalciferol (VITAMIN D3) 1,000 units tablet, Take 1 tablet by mouth daily, Disp: , Rfl:     Coenzyme Q10 (CO Q 10) 10 MG CAPS, Take 1 capsule by mouth daily, Disp: , Rfl:     metroNIDAZOLE (FLAGYL) 500 mg tablet, Take 1 tablet (500 mg total) by mouth every 8 (eight) hours for 10 days, Disp: 30 tablet, Rfl: 0    Multiple Vitamins-Minerals (EYE VITAMINS & MINERALS PO), Take by mouth, Disp: , Rfl:     Multiple Vitamins-Minerals (MULTIVITAMIN ADULT PO), Take 1 tablet by mouth daily, Disp: , Rfl:     olmesartan (BENICAR) 40 mg tablet, Take 1 tablet (40 mg total) by mouth daily, Disp: 90 tablet, Rfl: 1    olmesartan (BENICAR) 40 mg tablet, Take 1 tablet by mouth daily, Disp: , Rfl:     Omega-3 Fatty Acids (FISH OIL) 645 MG CAPS, Take 1 capsule by mouth daily, Disp: , Rfl:     B Complex Vitamins (VITAMIN B COMPLEX) TABS, Take 1 tablet by mouth daily, Disp: , Rfl:     Methylprednisolone 4 MG TBPK, Use as directed on package, Disp: 21 tablet, Rfl: 0    ACTIVE PROBLEM LIST:  Patient Active Problem List   Diagnosis    Nephrolithiasis    Allergic rhinitis    Benign hypertension    Diverticulosis    Hiatal hernia    Hyperlipidemia    Hyperoxaluria    Age-related cataract of left eye    Depression    Impaired fasting glucose    Diverticulitis       PAST MEDICAL HISTORY:  Past Medical History:   Diagnosis Date    Hypertension     Kidney stones     Pneumonia        PAST SURGICAL HISTORY:  Past Surgical History:   Procedure Laterality Date    DILATION AND CURETTAGE OF UTERUS      [place] CYSTO/URETERO/PYELOSCOPY, DX Right 2/15/2017    Procedure: URETEROSCOPY, STENT PLACEMENT ;  Surgeon: Lor Xiao MD;  [facility];  Service: Urology    NM CARLOS Rogers CM Right 2/15/2017    Procedure: ACCESS INTERPOLAR CALYX, INSERTED TWO WIRES INTO BLADDER, NEPHROLITHOTOMY  PERCUTANEOUS ;  Surgeon: Lor Xiao MD;  [facility];  Service: Urology       FAMILY HISTORY:  Family History   Problem Relation Age of Onset    Cancer Mother     Colon cancer Mother         CARCINOMA     Alzheimer's disease Father     Urinary tract infection Sister     Cancer Brother         BLADDER    Stroke Maternal Grandfather         CVA       SOCIAL HISTORY:  Social History     Social History    Marital status: /Civil Union     Spouse name: N/A    Number of children: 11    Years of education: N/A     Occupational History    SELLS TIME SHARES AT [employer]       Social History Main Topics    Smoking status: Former Smoker     Packs/day: 1 00     Years: 20 00    Smokeless tobacco: Former User     Quit date: 2007      Comment: smokes "very rarely"    Alcohol use Yes      Comment: social <2 GLASSES OF WINE PER WEEK     Drug use: No    Sexual activity: Not on file     Other Topics Concern    Not on file     Social History Narrative    ACTIVITIES -- COMPUTERS, READING     [address]           Review of Systems   Respiratory: Negative for shortness of breath  Cardiovascular: Negative for chest pain  Gastrointestinal: Negative for abdominal pain  Objective: There were no vitals filed for this visit  Physical Exam   Constitutional: She appears well-developed and well-nourished  Musculoskeletal:   No point tenderness in the shoulder  No swelling or erythema  Restricted range of motion as described in HPI  Nursing note and vitals reviewed  RESULTS:    No results found for this or any previous visit (from the past 1008 hour(s))  This note was created with voice recognition software  Phonic, grammatical and spelling errors may be present within the note as a result

## 2018-11-14 ENCOUNTER — OFFICE VISIT (OUTPATIENT)
Dept: INTERNAL MEDICINE CLINIC | Facility: CLINIC | Age: 73
End: 2018-11-14
Payer: MEDICARE

## 2018-11-14 VITALS
RESPIRATION RATE: 16 BRPM | HEIGHT: 63 IN | HEART RATE: 99 BPM | WEIGHT: 160.4 LBS | OXYGEN SATURATION: 98 % | DIASTOLIC BLOOD PRESSURE: 88 MMHG | BODY MASS INDEX: 28.42 KG/M2 | TEMPERATURE: 98.4 F | SYSTOLIC BLOOD PRESSURE: 156 MMHG

## 2018-11-14 DIAGNOSIS — M25.512 ACUTE PAIN OF LEFT SHOULDER: Primary | ICD-10-CM

## 2018-11-14 PROCEDURE — 99213 OFFICE O/P EST LOW 20 MIN: CPT | Performed by: INTERNAL MEDICINE

## 2018-11-14 RX ORDER — NAPROXEN 500 MG/1
250 TABLET ORAL 2 TIMES DAILY WITH MEALS
Qty: 60 TABLET | Refills: 0 | Status: SHIPPED | OUTPATIENT
Start: 2018-11-14 | End: 2020-02-18 | Stop reason: ALTCHOICE

## 2018-11-14 NOTE — PROGRESS NOTES
Assessment/Plan:      Since she had improvement on the 1st day with the steroid dose, will place her on naproxen 500 mg b i d     Will refer to Orthopedics  She may benefit from just a steroid shot  No Follow-up on file  No problem-specific Assessment & Plan notes found for this encounter  Diagnoses and all orders for this visit:    Acute pain of left shoulder  -     naproxen (NAPROSYN) 500 mg tablet; Take 0 5 tablets (250 mg total) by mouth 2 (two) times a day with meals  -     Ambulatory referral to Orthopedic Surgery; Future          Subjective:      Patient ID: Amilcar Menard is a 68 y o  female  Patient comes in today for follow-up  She is still having trouble with her shoulders  She felt good on the 1st day of the steroids but then her symptoms gradually returned as the steroids were reduced  Has the same pain with certain movement of the shoulder  No bruising  No swelling          ALLERGIES:  Allergies   Allergen Reactions    Other      "Belarusian food"    Seasonal Ic  [Cholestatin]        CURRENT MEDICATIONS:    Current Outpatient Prescriptions:     Ascorbic Acid (VITAMIN C) 100 MG tablet, Take 1 tablet by mouth daily, Disp: , Rfl:     B Complex Vitamins (VITAMIN B COMPLEX) TABS, Take 1 tablet by mouth daily, Disp: , Rfl:     cholecalciferol (VITAMIN D3) 1,000 units tablet, Take 1 tablet by mouth daily, Disp: , Rfl:     Coenzyme Q10 (CO Q 10) 10 MG CAPS, Take 1 capsule by mouth daily, Disp: , Rfl:     Methylprednisolone 4 MG TBPK, Use as directed on package, Disp: 21 tablet, Rfl: 0    Multiple Vitamins-Minerals (EYE VITAMINS & MINERALS PO), Take by mouth, Disp: , Rfl:     Multiple Vitamins-Minerals (MULTIVITAMIN ADULT PO), Take 1 tablet by mouth daily, Disp: , Rfl:     olmesartan (BENICAR) 40 mg tablet, Take 1 tablet (40 mg total) by mouth daily, Disp: 90 tablet, Rfl: 1    olmesartan (BENICAR) 40 mg tablet, Take 1 tablet by mouth daily, Disp: , Rfl:     Omega-3 Fatty Acids (FISH OIL) 645 MG CAPS, Take 1 capsule by mouth daily, Disp: , Rfl:     naproxen (NAPROSYN) 500 mg tablet, Take 0 5 tablets (250 mg total) by mouth 2 (two) times a day with meals, Disp: 60 tablet, Rfl: 0    ACTIVE PROBLEM LIST:  Patient Active Problem List   Diagnosis    Nephrolithiasis    Allergic rhinitis    Benign hypertension    Diverticulosis    Hiatal hernia    Hyperlipidemia    Hyperoxaluria    Age-related cataract of left eye    Depression    Impaired fasting glucose    Diverticulitis       PAST MEDICAL HISTORY:  Past Medical History:   Diagnosis Date    Hypertension     Kidney stones     Pneumonia        PAST SURGICAL HISTORY:  Past Surgical History:   Procedure Laterality Date    DILATION AND CURETTAGE OF UTERUS      [place] CYSTO/URETERO/PYELOSCOPY, DX Right 2/15/2017    Procedure: URETEROSCOPY, STENT PLACEMENT ;  Surgeon: William Hester MD;  [facility];  Service: Urology    [place] [address] KID STONE,2+ CM Right 2/15/2017    Procedure: ACCESS INTERPOLAR CALYX, INSERTED TWO WIRES INTO BLADDER, NEPHROLITHOTOMY  PERCUTANEOUS ;  Surgeon: William Hester MD;  [facility];  Service: Urology       FAMILY HISTORY:  Family History   Problem Relation Age of Onset    Cancer Mother     Colon cancer Mother         CARCINOMA     Alzheimer's disease Father     Urinary tract infection Sister     Cancer Brother         BLADDER    Stroke Maternal Grandfather         CVA       SOCIAL HISTORY:  Social History     Social History    Marital status: /Civil Union     Spouse name: N/A    Number of children: 11    Years of education: N/A     Occupational History    SELLS TIME SHARES AT [employer]       Social History Main Topics    Smoking status: Former Smoker     Packs/day: 1 00     Years: 20 00    Smokeless tobacco: Former User     Quit date: 2007      Comment: smokes "very rarely"    Alcohol use Yes      Comment: social <2 GLASSES OF WINE PER WEEK     Drug use:  No  Sexual activity: No     Other Topics Concern    Not on file     Social History Narrative    ACTIVITIES -- COMPUTERS, READING     BRUSHES TEETH TWICE A DAY     DAILY CAFFEINE CONSUMPTION 1 SERVING A DAY     DENTAL CARE REGULARLY           Review of Systems   Constitutional: Negative for fever  Musculoskeletal: Positive for neck pain  Objective:  Vitals:    11/14/18 1128   BP: 156/88   BP Location: Right arm   Patient Position: Sitting   Cuff Size: Adult   Pulse: 99   Resp: 16   Temp: 98 4 °F (36 9 °C)   TempSrc: Temporal   SpO2: 98%   Weight: 72 8 kg (160 lb 6 4 oz)   Height: 5' 3" (1 6 m)        Physical Exam   Constitutional: She appears well-developed and well-nourished  Musculoskeletal:   Shoulder does not appear swollen  There is no erythema  There is no swelling in the upper arm  No point tenderness in the [facility] joint  Nursing note and vitals reviewed  RESULTS:    No results found for this or any previous visit (from the past 1008 hour(s))  This note was created with voice recognition software  Phonic, grammatical and spelling errors may be present within the note as a result

## 2018-11-28 ENCOUNTER — OFFICE VISIT (OUTPATIENT)
Dept: OBGYN CLINIC | Facility: CLINIC | Age: 73
End: 2018-11-28
Payer: MEDICARE

## 2018-11-28 VITALS
WEIGHT: 163 LBS | HEIGHT: 63 IN | BODY MASS INDEX: 28.88 KG/M2 | HEART RATE: 81 BPM | SYSTOLIC BLOOD PRESSURE: 165 MMHG | DIASTOLIC BLOOD PRESSURE: 91 MMHG

## 2018-11-28 DIAGNOSIS — M25.512 ACUTE PAIN OF LEFT SHOULDER: Primary | ICD-10-CM

## 2018-11-28 DIAGNOSIS — M75.22 BICEPS TENDINITIS OF LEFT UPPER EXTREMITY: ICD-10-CM

## 2018-11-28 DIAGNOSIS — M62.838 TRAPEZIUS MUSCLE SPASM: ICD-10-CM

## 2018-11-28 DIAGNOSIS — R20.0 LEFT UPPER EXTREMITY NUMBNESS: ICD-10-CM

## 2018-11-28 DIAGNOSIS — M43.6 NECK STIFFNESS: ICD-10-CM

## 2018-11-28 PROCEDURE — 99204 OFFICE O/P NEW MOD 45 MIN: CPT | Performed by: FAMILY MEDICINE

## 2018-11-28 RX ORDER — PREDNISONE 20 MG/1
20 TABLET ORAL DAILY
Qty: 6 TABLET | Refills: 0 | Status: SHIPPED | OUTPATIENT
Start: 2018-11-28 | End: 2018-12-04

## 2018-11-28 NOTE — PROGRESS NOTES
Assessment/Plan:  Assessment/Plan   Diagnoses and all orders for this visit:    Acute pain of left shoulder  -     predniSONE 20 mg tablet; Take 1 tablet (20 mg total) by mouth daily for 6 days  -     Ambulatory referral to Physical Therapy; Future    Biceps tendinitis of left upper extremity  -     predniSONE 20 mg tablet; Take 1 tablet (20 mg total) by mouth daily for 6 days  -     Ambulatory referral to Physical Therapy; Future    Neck stiffness  -     Ambulatory referral to Physical Therapy; Future    Trapezius muscle spasm  -     Ambulatory referral to Physical Therapy; Future    Left upper extremity numbness  -     EMG 1 Limb; Future      70-year-old right-hand-dominant female with left shoulder pain more than 2 months duration  Discussed with patient physical exam, radiographs, impression, plan  X-rays noted for mild osteoarthritis of the glenohumeral joint  Physical exam is noted for trapezius spasm and tenderness of the long head biceps tendon  Range of motion of cervical spine is limited with right and left side bending  Range of motion of the shoulder is limited to forward flexion 100° and abduction 90° with internal rotation to the left lateral hip  She has normal strength  Clinical impression is trapezius spasm and biceps tendinitis  I discussed treatment regimen of prednisone 20 mg once daily with food for 6 days, and she is to start physical therapy as soon as possible and do home exercises directed  I will also refer her for EMG study to evaluate for left upper extremity neuropathy  She will follow up in 4 weeks at which point she will be re-evaluated  Subjective:   Patient ID: Rizwana Shepard is a 68 y o  female  Chief Complaint   Patient presents with    Left Shoulder - Pain       70-year-old right-hand-dominant female presents for evaluation of left shoulder pain of more than 2 months duration  She reports pain started shortly after receiving flu vaccine    Pain was described as localized to the lateral aspect of the upper arm, sudden onset, throbbing, radiating distally along the anterior lateral aspect the upper arm, constant, associated with swelling and numbness/tingling, and limited range of motion  She has difficulty with elevating the arm above shoulder level, and she is unable to reach behind her back  She reports that swelling had improved, but pain and limited range of motion persisted  She was seen by primary care provider and given course of Medrol Dosepak, and she has been taking naproxen 250 mg twice daily, with only mild intermittent relief  She reports that prior to receiving vaccine she did not have any issues of pain or limited range of motion in left upper extremity  Shoulder Pain   The current episode started more than 1 month ago  The problem occurs constantly  The problem has been unchanged  Associated symptoms include arthralgias, joint swelling and numbness  Pertinent negatives include no abdominal pain, chest pain, chills, fever, rash, sore throat or weakness  Exacerbated by: Arm use  She has tried rest and NSAIDs (Medrol Dosepak) for the symptoms  The treatment provided mild relief  The following portions of the patient's history were reviewed and updated as appropriate: She  has a past medical history of Hypertension; Kidney stones; and Pneumonia  She  has a past surgical history that includes Dilation and curettage of uterus; pr percut remv kid stone,2+ cm (Right, 2/15/2017); and pr cysto/uretero/pyeloscopy, dx (Right, 2/15/2017)  Her family history includes Alzheimer's disease in her father; Cancer in her brother and mother; Colon cancer in her mother; Stroke in her maternal grandfather; Urinary tract infection in her sister  She  reports that she has quit smoking  She has a 20 00 pack-year smoking history  She quit smokeless tobacco use about 11 years ago  She reports that she drinks alcohol  She reports that she does not use drugs    She is allergic to other and seasonal ic  [cholestatin]       Review of Systems   Constitutional: Negative for chills and fever  HENT: Negative for sore throat  Eyes: Negative for visual disturbance  Respiratory: Negative for shortness of breath  Cardiovascular: Negative for chest pain  Gastrointestinal: Negative for abdominal pain  Genitourinary: Negative for flank pain  Musculoskeletal: Positive for arthralgias and joint swelling  Skin: Negative for rash and wound  Neurological: Positive for numbness  Negative for weakness  Hematological: Does not bruise/bleed easily  Psychiatric/Behavioral: Negative for self-injury  Objective:  Vitals:    11/28/18 0803   BP: 165/91   Pulse: 81   Weight: 73 9 kg (163 lb)   Height: 5' 3" (1 6 m)     Back Exam     Comments:  Cervical spine  -bilateral paraspinal hypertonicity  -limited range of motion with right and left rotation  -normal strength, sensation, and biceps reflexes both upper extremities      Left Elbow Exam     Tenderness   The patient is experiencing no tenderness  Range of Motion   The patient has normal left elbow ROM  Muscle Strength   The patient has normal left elbow strength  Left Shoulder Exam     Tenderness   The patient is experiencing tenderness in the biceps tendon (Trapezius, middle deltoid)  Range of Motion   Active Abduction: 90   Forward Flexion: 100   Left shoulder internal rotation 0 degrees: Lateral hip  Muscle Strength   The patient has normal left shoulder strength  Comments:  Negative belly press            Physical Exam   Constitutional: She is oriented to person, place, and time  She appears well-developed  No distress  HENT:   Head: Normocephalic  Eyes: Conjunctivae are normal    Neck: No tracheal deviation present  Cardiovascular: Normal rate  Pulmonary/Chest: Effort normal  No respiratory distress  Abdominal: She exhibits no distension     Neurological: She is alert and oriented to person, place, and time  Skin: Skin is warm and dry  Psychiatric: She has a normal mood and affect  Her behavior is normal    Nursing note and vitals reviewed  I have personally reviewed pertinent films in PACS and my interpretation is Left shoulder mild glenohumeral arthritis

## 2018-12-05 ENCOUNTER — EVALUATION (OUTPATIENT)
Dept: PHYSICAL THERAPY | Facility: CLINIC | Age: 73
End: 2018-12-05
Payer: MEDICARE

## 2018-12-05 DIAGNOSIS — M25.512 ACUTE PAIN OF LEFT SHOULDER: Primary | ICD-10-CM

## 2018-12-05 PROCEDURE — G8991 OTHER PT/OT GOAL STATUS: HCPCS

## 2018-12-05 PROCEDURE — 97161 PT EVAL LOW COMPLEX 20 MIN: CPT

## 2018-12-05 PROCEDURE — 97535 SELF CARE MNGMENT TRAINING: CPT

## 2018-12-05 PROCEDURE — 97035 APP MDLTY 1+ULTRASOUND EA 15: CPT

## 2018-12-05 PROCEDURE — G8990 OTHER PT/OT CURRENT STATUS: HCPCS

## 2018-12-05 NOTE — PROGRESS NOTES
PT Evaluation     Today's date: 2018  Patient name: Madisyn Armenta  : 1945  MRN: [id number]  Referring provider: August Gill DO  Dx:   Encounter Diagnosis     ICD-10-CM    1  Acute pain of left shoulder M25 512                   Assessment  Assessment details: Pt presents with Left Shoulder pain that began 2018 following flu vaccination  Reports pain throughout LUE since injection  Reports prednisone is providing some relief  Reports pain with all movement of shoulder  Pain sharp and immediate  PT notes limited ROM and strength  Tenderness noted with palpation of bicep mm  Pt will benefit from PT tx to eliminate symptoms and restore normal functional level  Impairments: abnormal or restricted ROM, activity intolerance, impaired physical strength, lacks appropriate home exercise program and pain with function     Prognosis: good    Goals  ST  Decrease pain 50% 4 wk  2  Increase shoulder ROM by 30-40 degrees 4 wk  3  Increase shoulder strength to 4/5 4 wk  Pt will report no difficulty with activity below shoulder level 4 wk  LT  Pt will report no pain 8 wk  2  Increase shoulder ROM to WNL 8 wk  3  Increase shoulder strength to WNL 8 wk  Pt will report no limitations with ADL's 8 wk    Plan  Patient would benefit from: PT eval  Planned modality interventions: cryotherapy, thermotherapy: hydrocollator packs, ultrasound and unattended electrical stimulation  Planned therapy interventions: joint mobilization, manual therapy, strengthening, stretching, therapeutic activities, therapeutic exercise, home exercise program, functional ROM exercises and flexibility  Frequency: 2x week  Duration in weeks: 8  Treatment plan discussed with: patient        Subjective Evaluation    History of Present Illness  Mechanism of injury: Pain began left shoulder 2018 following flu shot  Reports increased swelling following injection  Has had continued pain since that time    Pain radiates from shoulder to fingers  Pain sharp with movement  No pain at rest   Pain wakes her consistently from sleep  Reports mild "picking" sensation in LUE  Pain consistently originate left humeral region with movement  Pt currently day 4 of prednisone which is helping a little per pt  Reports intermittent clicking in shoulder non-painful  Difficulty with all ADL's due to symptoms  Pain  Current pain ratin  At best pain ratin  At worst pain ratin  Location: LUE  Quality: sharp    Hand dominance: right  Life stress: Works at [employer]  Difficulty getting in/out of car              Objective     Tenderness     Additional Tenderness Details  Tender to palpation mm belly left bicep     Active Range of Motion   Left Shoulder   Flexion: 80 degrees   Abduction: 45 degrees     Passive Range of Motion   Left Shoulder   Flexion: 104 degrees with pain  Abduction: 80 degrees with pain  External rotation 45°: 18 degrees with pain  Internal rotation 45°: 65 degrees     Strength/Myotome Testing     Left Shoulder     Planes of Motion   Flexion: 3-   Abduction: 3-   External rotation at 0°: 3-   Internal rotation at 0°: 3-     Isolated Muscles   Biceps: 3+   Triceps: 3+     Tests     Additional Tests Details  Pain limiting ability to participate with special tests          Precautions:  N/A    Specialty Daily Treatment Diary     Manual  18       Left shoulder                                            Exercise Diary  18       pendulums        pulleys        Cane AAROM        Shrugs/retract        Bicep curls        tricep ext        Bent over row        Finger ladder        LTP/MTP        t-band ER/IR        t-band Add                                                                                    Modalities 18       Ultrasound Left Shld 1 3 w/cm2 10'

## 2018-12-07 ENCOUNTER — TRANSCRIBE ORDERS (OUTPATIENT)
Dept: ADMINISTRATIVE | Facility: HOSPITAL | Age: 73
End: 2018-12-07

## 2018-12-07 ENCOUNTER — OFFICE VISIT (OUTPATIENT)
Dept: PHYSICAL THERAPY | Facility: CLINIC | Age: 73
End: 2018-12-07
Payer: MEDICARE

## 2018-12-07 DIAGNOSIS — M75.02 ADHESIVE BURSITIS OF LEFT SHOULDER: Primary | ICD-10-CM

## 2018-12-07 DIAGNOSIS — M25.512 ACUTE PAIN OF LEFT SHOULDER: Primary | ICD-10-CM

## 2018-12-07 PROCEDURE — 97140 MANUAL THERAPY 1/> REGIONS: CPT | Performed by: PHYSICAL THERAPIST

## 2018-12-07 PROCEDURE — 97035 APP MDLTY 1+ULTRASOUND EA 15: CPT | Performed by: PHYSICAL THERAPIST

## 2018-12-07 PROCEDURE — 97150 GROUP THERAPEUTIC PROCEDURES: CPT | Performed by: PHYSICAL THERAPIST

## 2018-12-07 NOTE — PROGRESS NOTES
Daily Note     Today's date: 2018  Patient name: Saurabh Adams  : 1945  MRN: [id number]  Referring provider: Joanna Draper DO  Dx:   Encounter Diagnosis     ICD-10-CM    1  Acute pain of left shoulder M25 512        Start Time: 0900  Stop Time: 945  Total time in clinic (min): 45 minutes    Subjective: Pt reports she has noticed some improvement already, but had to stop taking the steroids because it olya her BP too much  Notes relief from ultrasound  Objective: See treatment diary below  Precautions:  N/A     Specialty Daily Treatment Diary      Manual  18         Left shoulder    15 min                                                                       Exercise Diary  18         pendulums    30x each         pulleys    10x3" Flex         Cane AAROM    10x3" each  Flex, Abd, Ext         Shrugs/retract    2x10 1#         Bicep curls    2x10 1#         tricep ext             Bent over row             Finger ladder             LTP/MTP    2x10 Red         t-band ER/IR    2x10 Red          t-band Add                                                                                                                                                 Modalities 18         Ultrasound Left Shld 1 3 w/cm2 10'  1 3 w/cm2 10 min                                           Assessment: Tolerated treatment well  Patient exhibited good technique with therapeutic exercises and would benefit from continued PT  Cuing required to tx for proper technique  Limited active and passive ROM of the left shoulder  No adverse reaction noted to ultrasound application  Plan: Continue per plan of care  Progress treatment as tolerated

## 2018-12-09 ENCOUNTER — HOSPITAL ENCOUNTER (OUTPATIENT)
Dept: MRI IMAGING | Facility: HOSPITAL | Age: 73
Discharge: HOME | End: 2018-12-09
Attending: SPECIALIST

## 2018-12-09 DIAGNOSIS — M75.02 ADHESIVE BURSITIS OF LEFT SHOULDER: ICD-10-CM

## 2018-12-10 ENCOUNTER — OFFICE VISIT (OUTPATIENT)
Dept: PHYSICAL THERAPY | Facility: CLINIC | Age: 73
End: 2018-12-10
Payer: MEDICARE

## 2018-12-10 DIAGNOSIS — M25.512 ACUTE PAIN OF LEFT SHOULDER: Primary | ICD-10-CM

## 2018-12-10 PROCEDURE — 97140 MANUAL THERAPY 1/> REGIONS: CPT

## 2018-12-10 PROCEDURE — 97110 THERAPEUTIC EXERCISES: CPT

## 2018-12-10 NOTE — PROGRESS NOTES
Daily Note     Today's date: 12/10/2018  Patient name: Milan Gastelum  : 1945  MRN: [id number]  Referring provider: Andra Pandya DO  Dx:   Encounter Diagnosis     ICD-10-CM    1  Acute pain of left shoulder M25 512                   Subjective: The patient states that she has some shooting pains in her arm  Objective: See treatment diary below      Assessment: Tolerated treatment well  Patient exhibited good technique with therapeutic exercises and would benefit from continued PT  Patient declined [place] today  Plan: Continue per plan of care       Precautions:  N/A     Specialty Daily Treatment Diary      Manual  12-5-18  12/7/18 12/10/18       Left shoulder    15 min  15'                                                                     Exercise Diary  12-5-18  12/7/18 12/10/18       pendulums    30x each  30x       pulleys    10x3" Flex  10x 3"       Cane AAROM    10x3" each  Flex, Abd, Ext  10x 3"       Shrugs/retract    2x10 1#  20x 1 #       Bicep curls    2x10 1#  20x 1#       tricep ext             Bent over row             Finger ladder      20x wall slides       LTP/MTP    2x10 Red  Red 30x       t-band ER/IR    2x10 Red   Red 30x       t-band Add                                                                                                                                                 Modalities 12-5-18  12/7/18 12/10/18       Ultrasound Left Shld 1 3 w/cm2 10'  1 3 w/cm2 10 min  Declined

## 2018-12-14 ENCOUNTER — OFFICE VISIT (OUTPATIENT)
Dept: PHYSICAL THERAPY | Facility: CLINIC | Age: 73
End: 2018-12-14
Payer: MEDICARE

## 2018-12-14 DIAGNOSIS — M25.512 ACUTE PAIN OF LEFT SHOULDER: Primary | ICD-10-CM

## 2018-12-14 PROCEDURE — 97110 THERAPEUTIC EXERCISES: CPT

## 2018-12-14 PROCEDURE — 97140 MANUAL THERAPY 1/> REGIONS: CPT

## 2018-12-14 NOTE — PROGRESS NOTES
Daily Note     Today's date: 2018  Patient name: Bobby Muse  : 1945  MRN: [id number]  Referring provider: Kate Lacy DO  Dx:   Encounter Diagnosis     ICD-10-CM    1  Acute pain of left shoulder M25 512                   Subjective: It still hurts but not as frequently      Objective: See treatment diary below      Assessment: Tolerated treatment well  Continued symptoms primarily with lateral/horizontal movements of LUE  Symptoms primarily in region of bicep tendon and mm belly  Pt does have MRI scheduled for 18  Patient would benefit from continued PT      Plan: Continue per plan of care         Precautions:  N/A     Specialty Daily Treatment Diary      Manual  12-5-18  12/7/18 12/10/18  [id number]     Left shoulder    15 min  15'  12'                                                                   Exercise Diary  12-5-18  12/7/18 12/10/18  [id number]     pendulums    30x each  30x  30x     pulleys    10x3" Flex  10x 3"  30x  Flex/abd     Cane AAROM    10x3" each  Flex, Abd, Ext  10x 3"       Shrugs/retract    2x10 1#  20x 1 #  2#  30x     Bicep curls    2x10 1#  20x 1#  2#  30x     tricep ext             Bent over row             Finger ladder      20x wall slides  20x  flex     LTP/MTP    2x10 Red  Red 30x  red  30x     t-band ER/IR    2x10 Red   Red 30x  red  30x     t-band Add        red  30x                                                                                                                                         Modalities 12-5-18  12/7/18 12/10/18  [id number]     Ultrasound Left Shld 1 3 w/cm2 10'  1 3 w/cm2 10 min  Declined  declined

## 2018-12-17 ENCOUNTER — OFFICE VISIT (OUTPATIENT)
Dept: PHYSICAL THERAPY | Facility: CLINIC | Age: 73
End: 2018-12-17
Payer: MEDICARE

## 2018-12-17 DIAGNOSIS — M25.512 ACUTE PAIN OF LEFT SHOULDER: Primary | ICD-10-CM

## 2018-12-17 PROCEDURE — 97035 APP MDLTY 1+ULTRASOUND EA 15: CPT

## 2018-12-17 NOTE — PROGRESS NOTES
Daily Note     Today's date: 2018  Patient name: Amilcar Menard  : 1945  MRN: [id number]  Referring provider: Slim Phillips DO  Dx:   Encounter Diagnosis     ICD-10-CM    1  Acute pain of left shoulder M25 512                   Subjective: The patient states that she is really sore today and does not want to do much  Objective: See treatment diary below      Assessment: Tolerated treatment poor  Patient would benefit from continued PT  Treatment modified today secondary to increase in pain  Only US performed  Plan: Continue per plan of care       Precautions:  N/A     Specialty Daily Treatment Diary      Manual  12-5-18  12/7/18 12/10/18  0218   Left shoulder    15 min  15'  12'                                                                   Exercise Diary  12-5-18  12/7/18 12/10/18  82-08-31 18   pendulums    30x each  30x  30x     pulleys    10x3" Flex  10x 3"  30x  Flex/abd     Cane AAROM    10x3" each  Flex, Abd, Ext  10x 3"       Shrugs/retract    2x10 1#  20x 1 #  2#  30x     Bicep curls    2x10 1#  20x 1#  2#  30x     tricep ext             Bent over row             Finger ladder      20x wall slides  20x  flex     LTP/MTP    2x10 Red  Red 30x  red  30x     t-band ER/IR    2x10 Red   Red 30x  red  30x     t-band Add        red  30x                                                                                                                                         Modalities 12-5-18  12/7/18 12/10/18  [id number] 18   Ultrasound Left Shld 1 3 w/cm2 10'  1 3 w/cm2 10 min  Declined  declined  10'

## 2018-12-20 ENCOUNTER — HOSPITAL ENCOUNTER (OUTPATIENT)
Dept: RADIOLOGY | Facility: HOSPITAL | Age: 73
Discharge: HOME | End: 2018-12-20
Payer: MEDICARE

## 2018-12-20 ENCOUNTER — TRANSCRIBE ORDERS (OUTPATIENT)
Dept: ADMINISTRATIVE | Facility: HOSPITAL | Age: 73
End: 2018-12-20

## 2018-12-20 DIAGNOSIS — N20.0 CALCULUS OF KIDNEY: ICD-10-CM

## 2018-12-20 DIAGNOSIS — N20.0 CALCULUS OF KIDNEY: Primary | ICD-10-CM

## 2018-12-20 PROCEDURE — 74018 RADEX ABDOMEN 1 VIEW: CPT

## 2018-12-21 ENCOUNTER — OFFICE VISIT (OUTPATIENT)
Dept: PHYSICAL THERAPY | Facility: CLINIC | Age: 73
End: 2018-12-21
Payer: MEDICARE

## 2018-12-21 DIAGNOSIS — M25.512 ACUTE PAIN OF LEFT SHOULDER: Primary | ICD-10-CM

## 2018-12-21 PROCEDURE — 97110 THERAPEUTIC EXERCISES: CPT

## 2018-12-21 PROCEDURE — 97140 MANUAL THERAPY 1/> REGIONS: CPT

## 2018-12-21 PROCEDURE — 97035 APP MDLTY 1+ULTRASOUND EA 15: CPT

## 2018-12-21 NOTE — PROGRESS NOTES
Daily Note     Today's date: 2018  Patient name: Shayy Lorenzo  : 1945  MRN: [id number]  Referring provider: Johnson Benjamin DO  Dx:   Encounter Diagnosis     ICD-10-CM    1  Acute pain of left shoulder M25 512                   Subjective:    "Im I a lot of pain"        Objective: See treatment diary below    Pain in 6/10      Assessment: Tolerated treatment well  Patient exhibited good technique with therapeutic exercises    Pt reports some minimal improvement since IE  Pt voiced significant frustration with injury from shot  Requested to slightly modify session today 2 to pain  Plan: Continue per plan of care     pecialty Daily Treatment Diary      Manual  12/21/18  12/7/18 12/10/18  73-28-60 18   Left shoulder  14  15 min  15'  12'                                                                   Exercise Diary  12/21/18  12/7/18 12/10/18  94-94-64 18   pendulums  30  30x each  30x  30x     pulleys  30  10x3" Flex  10x 3"  30x  Flex/abd     Cane AAROM    10x3" each  Flex, Abd, Ext  10x 3"       Shrugs/retract  2#  30  2x10 1#  20x 1 #  2#  30x     Bicep curls  2#  30  2x10 1#  20x 1#  2#  30x     tricep ext             Bent over row             Finger ladder  20x flex    20x wall slides  20x  flex     LTP/MTP  20red  2x10 Red  Red 30x  red  30x     t-band ER/IR  20red  2x10 Red   Red 30x  red  30x     t-band Add  20   red      red  30x                                                                                                                                         Modalities 12/21/18  12/7/18 12/10/18  72-88-42 18   Ultrasound Left Shld 1 3 w/cm2 10'  1 3 w/cm2 10 min  Declined  declined  10'

## 2018-12-24 ENCOUNTER — OFFICE VISIT (OUTPATIENT)
Dept: PHYSICAL THERAPY | Facility: CLINIC | Age: 73
End: 2018-12-24
Payer: MEDICARE

## 2018-12-24 DIAGNOSIS — M25.512 ACUTE PAIN OF LEFT SHOULDER: Primary | ICD-10-CM

## 2018-12-24 PROCEDURE — 97140 MANUAL THERAPY 1/> REGIONS: CPT

## 2018-12-24 PROCEDURE — 97110 THERAPEUTIC EXERCISES: CPT

## 2018-12-24 PROCEDURE — 97035 APP MDLTY 1+ULTRASOUND EA 15: CPT

## 2018-12-24 NOTE — PROGRESS NOTES
Daily Note     Today's date: 2018  Patient name: Madisyn Armenta  : 1945  MRN: [id number]  Referring provider: August Gill DO  Dx:   Encounter Diagnosis     ICD-10-CM    1  Acute pain of left shoulder M25 512                   Subjective: The patient states that she is still sore  Objective: See treatment diary below      Assessment: Tolerated treatment well  Patient exhibited good technique with therapeutic exercises and would benefit from continued PT  Patient did have some soreness with manual therapy  Plan: Continue per plan of care       pecialty Daily Treatment Diary      Manual  12/21/18  12/24/18 12/10/18  13-04-56 18   Left shoulder  14  15 min  15'  12'                                                                   Exercise Diary  12/21/18  12/24/18 12/10/18  88-86-08 18   pendulums  30  30x each  30x  30x     pulleys  30  10x3" Flex  10x 3"  30x  Flex/abd     Cane AAROM    10x3" each  Flex, Abd, Ext  10x 3"       Shrugs/retract  2#  30  2x10 2#  20x 1 #  2#  30x     Bicep curls  2#  30  2x10 2#  20x 1#  2#  30x     tricep ext             Bent over row             Finger ladder  20x flex    20x wall slides  20x  flex     LTP/MTP  20red  2x10 Red  Red 30x  red  30x     t-band ER/IR  20red  2x10 Red   Red 30x  red  30x     t-band Add  20   red      red  30x                                                                                                                                         Modalities 12/21/18  12/24/18 12/10/18  39-88-82 18   Ultrasound Left Shld 1 3 w/cm2 10'  1 3 w/cm2 10 min  Declined  declined  10'

## 2018-12-26 ENCOUNTER — PROCEDURE VISIT (OUTPATIENT)
Dept: NEUROLOGY | Facility: CLINIC | Age: 73
End: 2018-12-26
Payer: MEDICARE

## 2018-12-26 DIAGNOSIS — R20.0 LEFT UPPER EXTREMITY NUMBNESS: ICD-10-CM

## 2018-12-26 PROCEDURE — 95886 MUSC TEST DONE W/N TEST COMP: CPT | Performed by: PHYSICAL MEDICINE & REHABILITATION

## 2018-12-26 PROCEDURE — 95908 NRV CNDJ TST 3-4 STUDIES: CPT | Performed by: PHYSICAL MEDICINE & REHABILITATION

## 2018-12-26 NOTE — PROGRESS NOTES
The procedure was discussed with the patient  Verbal consent was obtained after discussing risks and benefits  A sterile concentric needle electrode was used  The patient tolerated the procedure well  There were no complications  EMG LEFT UPPER EXTREMITY    Motor and sensory conduction studies were performed on the  median and ulnar nerves  The distal motor latencies were normal  The motor action potential amplitudes were normal  Motor conduction velocities were normal including conduction velocity of the ulnar nerve across the elbow  The  median and ulnar F waves were normal     The median sensory peak latency was prolonged with a normal sensory action potential amplitude  The ulnar  sensory action potential was normal     Concentric needle EMG was performed in various distal and proximal muscles of the left upper extremity including APB, FDI, pronator teres, deltoid, biceps, triceps and low cervical paraspinal region  There was no evidence of active denervation any of the muscles tested  The compound motor unit action potentials were of normal configuration with interference patterns being full or full for effort in the remaining muscles tested  IMPRESSION:  There is electrophysiologic evidence of a:    1  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

## 2018-12-27 ENCOUNTER — HOSPITAL ENCOUNTER (OUTPATIENT)
Dept: MRI IMAGING | Facility: HOSPITAL | Age: 73
Discharge: HOME | End: 2018-12-27
Attending: SPECIALIST
Payer: MEDICARE

## 2018-12-27 PROCEDURE — 73221 MRI JOINT UPR EXTREM W/O DYE: CPT

## 2018-12-28 ENCOUNTER — OFFICE VISIT (OUTPATIENT)
Dept: PHYSICAL THERAPY | Facility: CLINIC | Age: 73
End: 2018-12-28
Payer: MEDICARE

## 2018-12-28 DIAGNOSIS — M25.512 ACUTE PAIN OF LEFT SHOULDER: Primary | ICD-10-CM

## 2018-12-28 PROCEDURE — 97150 GROUP THERAPEUTIC PROCEDURES: CPT

## 2018-12-28 PROCEDURE — 97035 APP MDLTY 1+ULTRASOUND EA 15: CPT

## 2018-12-28 PROCEDURE — 97140 MANUAL THERAPY 1/> REGIONS: CPT

## 2018-12-31 ENCOUNTER — APPOINTMENT (OUTPATIENT)
Dept: PHYSICAL THERAPY | Facility: CLINIC | Age: 73
End: 2018-12-31
Payer: MEDICARE

## 2019-01-02 ENCOUNTER — APPOINTMENT (OUTPATIENT)
Dept: LAB | Facility: HOSPITAL | Age: 74
End: 2019-01-02
Payer: MEDICARE

## 2019-01-02 DIAGNOSIS — E78.2 MIXED HYPERLIPIDEMIA: ICD-10-CM

## 2019-01-02 DIAGNOSIS — R73.01 IMPAIRED FASTING GLUCOSE: ICD-10-CM

## 2019-01-02 LAB
ALBUMIN SERPL BCP-MCNC: 3.1 G/DL (ref 3.5–5)
ALP SERPL-CCNC: 68 U/L (ref 46–116)
ALT SERPL W P-5'-P-CCNC: 18 U/L (ref 12–78)
ANION GAP SERPL CALCULATED.3IONS-SCNC: 8 MMOL/L (ref 4–13)
AST SERPL W P-5'-P-CCNC: 13 U/L (ref 5–45)
BILIRUB SERPL-MCNC: 0.3 MG/DL (ref 0.2–1)
BUN SERPL-MCNC: 28 MG/DL (ref 5–25)
CALCIUM SERPL-MCNC: 9.1 MG/DL (ref 8.3–10.1)
CHLORIDE SERPL-SCNC: 105 MMOL/L (ref 100–108)
CHOLEST SERPL-MCNC: 299 MG/DL (ref 50–200)
CO2 SERPL-SCNC: 28 MMOL/L (ref 21–32)
CREAT SERPL-MCNC: 0.89 MG/DL (ref 0.6–1.3)
EST. AVERAGE GLUCOSE BLD GHB EST-MCNC: 137 MG/DL
GFR SERPL CREATININE-BSD FRML MDRD: 65 ML/MIN/1.73SQ M
GLUCOSE P FAST SERPL-MCNC: 126 MG/DL (ref 65–99)
HBA1C MFR BLD: 6.4 % (ref 4.2–6.3)
HDLC SERPL-MCNC: 97 MG/DL (ref 40–60)
LDLC SERPL CALC-MCNC: 188 MG/DL (ref 0–100)
NONHDLC SERPL-MCNC: 202 MG/DL
POTASSIUM SERPL-SCNC: 4.5 MMOL/L (ref 3.5–5.3)
PROT SERPL-MCNC: 7.2 G/DL (ref 6.4–8.2)
SODIUM SERPL-SCNC: 141 MMOL/L (ref 136–145)
TRIGL SERPL-MCNC: 70 MG/DL

## 2019-01-02 PROCEDURE — 80061 LIPID PANEL: CPT

## 2019-01-02 PROCEDURE — 83036 HEMOGLOBIN GLYCOSYLATED A1C: CPT

## 2019-01-02 PROCEDURE — 80053 COMPREHEN METABOLIC PANEL: CPT

## 2019-01-02 PROCEDURE — 36415 COLL VENOUS BLD VENIPUNCTURE: CPT

## 2019-01-04 ENCOUNTER — OFFICE VISIT (OUTPATIENT)
Dept: PHYSICAL THERAPY | Facility: CLINIC | Age: 74
End: 2019-01-04
Payer: MEDICARE

## 2019-01-04 DIAGNOSIS — M25.512 ACUTE PAIN OF LEFT SHOULDER: Primary | ICD-10-CM

## 2019-01-04 PROCEDURE — 97140 MANUAL THERAPY 1/> REGIONS: CPT

## 2019-01-04 PROCEDURE — 97035 APP MDLTY 1+ULTRASOUND EA 15: CPT

## 2019-01-04 PROCEDURE — 97110 THERAPEUTIC EXERCISES: CPT

## 2019-01-04 NOTE — PROGRESS NOTES
Daily Note     Today's date: 2019  Patient name: Saskia Brandt  : 1945  MRN: [id number]  Referring provider: Jaime Hickey DO  Dx:   Encounter Diagnosis     ICD-10-CM    1  Acute pain of left shoulder M25 512                   Subjective: Its feeling a little better, but when it hurts it hurts"        Objective: See treatment diary below    Pain in 3-4/10      Assessment: Tolerated treatment fair  Patient exhibited good technique with therapeutic exercises    Pt  to [facility] today to receive results of MRI  Pt if frustrated with progress, but does report being able to lift arm higher  Plan: Continue per plan of care     pecialty Daily Treatment Diary      Manual  18  36-57-74 19   Left shoulder  14  15 min  15'  12' 13                                                                 Exercise Diary  18  29-83-91 19   pendulums  30  30x each  30x  30x  30   pulleys  30  10x3" Flex  10x 3"  30x  Flex/abd  30   Cane AAROM    10x3" each  Flex, Abd, Ext  10x 3"       Shrugs/retract  2#  30  2x10 2#  20x 1 #  2#  30x  2#  30   Bicep curls  2#  30  2x10 2#  20x 1#  2#  30x  2#   tricep ext             Bent over row             Finger ladder  20x flex    20x wall slides  20x  flex 20   LTP/MTP  20red  2x10 Red  Red 30x  red  30x  gr  30   t-band ER/IR  20red  2x10 Red   Red 30x  red  30x  gr  30   t-band Add  20   red      red  30x  gr  30                                                                                                                                       Modalities 18  61-12-33 19   Ultrasound Left Shld 1 3 w/cm2 10'  1 3 w/cm2 10 min 10' 1 3 chambers/cm2  declined  10'

## 2019-01-07 ENCOUNTER — OFFICE VISIT (OUTPATIENT)
Dept: PHYSICAL THERAPY | Facility: CLINIC | Age: 74
End: 2019-01-07
Payer: MEDICARE

## 2019-01-07 DIAGNOSIS — M25.512 ACUTE PAIN OF LEFT SHOULDER: Primary | ICD-10-CM

## 2019-01-07 PROCEDURE — 97140 MANUAL THERAPY 1/> REGIONS: CPT

## 2019-01-07 PROCEDURE — 97110 THERAPEUTIC EXERCISES: CPT

## 2019-01-07 NOTE — PROGRESS NOTES
Daily Note     Today's date: 2019  Patient name: Markel Osuna  : 1945  MRN: [id number]  Referring provider: Sunny Cortez DO  Dx: No diagnosis found  Subjective: "My shoulder feels better since my injection last week"        Objective: See treatment diary below    Pain in 2/10    Reports 6/10 with activity  Assessment: Tolerated treatment well  Patient exhibited good technique with therapeutic exercises    Pt reports significant pain relief since receiving injection last week  Able to flex shoulder with full ROM  Abduction tolerable with minimal pain reported  Pt declined US 2 to time restraint  Stressed importance of not overdoing it  Will attempt UBE next visit  Plan: Continue per plan of care         pecialty Daily Treatment Diary      Manual  18  50-22-78 19   Left shoulder 15  15 min  15'  12' 13                                                                 Exercise Diary  18   pendulums  30  30x each  30x  30x  30   pulleys  30  10x3" Flex  10x 3"  30x  Flex/abd  30   Cane AAROM  10 each  10x3" each  Flex, Abd, Ext  10x 3"       Shrugs/retract  2#  30  2x10 2#  20x 1 #  2#  30x  2#  30   Bicep curls  2#  30  2x10 2#  20x 1#  2#  30x  2#   tricep ext             Bent over row 2#    30           Finger ladder 10 each flex   abd    20x wall slides  20x  flex 20   LTP/MTP  20red  2x10 Red  Red 30x  red  30x  gr  30   t-band ER/IR  20red  2x10 Red   Red 30x  red  30x  gr  30   t-band Add  20   red      red  30x  gr  30    UBE  NV                                                                                                                                 Modalities 18  81-67-40 19   Ultrasound Left Shld declined  1 3 w/cm2 10 min 10' 1 3 chambers/cm2  declined  10'

## 2019-01-08 ENCOUNTER — OFFICE VISIT (OUTPATIENT)
Dept: INTERNAL MEDICINE CLINIC | Facility: CLINIC | Age: 74
End: 2019-01-08
Payer: MEDICARE

## 2019-01-08 VITALS
BODY MASS INDEX: 29.23 KG/M2 | HEIGHT: 63 IN | HEART RATE: 96 BPM | SYSTOLIC BLOOD PRESSURE: 132 MMHG | DIASTOLIC BLOOD PRESSURE: 84 MMHG | OXYGEN SATURATION: 98 % | RESPIRATION RATE: 18 BRPM | WEIGHT: 165 LBS

## 2019-01-08 DIAGNOSIS — E78.2 MIXED HYPERLIPIDEMIA: ICD-10-CM

## 2019-01-08 DIAGNOSIS — I10 BENIGN HYPERTENSION: Primary | ICD-10-CM

## 2019-01-08 DIAGNOSIS — R73.01 IMPAIRED FASTING GLUCOSE: ICD-10-CM

## 2019-01-08 DIAGNOSIS — M77.8 LEFT SHOULDER TENDONITIS: ICD-10-CM

## 2019-01-08 PROBLEM — G56.02 CARPAL TUNNEL SYNDROME OF LEFT WRIST: Status: ACTIVE | Noted: 2019-01-08

## 2019-01-08 PROCEDURE — 99214 OFFICE O/P EST MOD 30 MIN: CPT | Performed by: PHYSICIAN ASSISTANT

## 2019-01-08 NOTE — PATIENT INSTRUCTIONS
Continue current medical management, no change of medications  Will recheck labs in 6 months with follow-up visit  Continue follow-up with Orthopedics and Urology

## 2019-01-08 NOTE — PROGRESS NOTES
Assessment/Plan:   Patient Instructions   Continue current medical management, no change of medications  Will recheck labs in 6 months with follow-up visit  Continue follow-up with Orthopedics and Urology  BMI Counseling: Body mass index is 29 23 kg/m²  Discussed the patient's BMI with her  The BMI is above average  BMI counseling and education was provided to the patient  Nutrition recommendations include reducing portion sizes and decreasing overall calorie intake  Return in about 6 months (around 7/8/2019) for Next scheduled follow up  Diagnoses and all orders for this visit:    Benign hypertension    Mixed hyperlipidemia  -     Comprehensive metabolic panel; Future  -     Lipid panel; Future    Left shoulder tendonitis    Impaired fasting glucose  -     Hemoglobin A1C; Future          Subjective:      Patient ID: Markel Osuna is a 68 y o  female  Follow-up    Hypertension:  Controlled  Michelle cp, palp, sob, edema, HA, dizziness, diaphoresis, syncope, visual disturbance  Hyperlipidemia:  Labs remained stable, total cholesterol and LDL are elevated however patient has very high HDL with no significant history in her family of heart disease, strokes, or hyperlipidemia  Impaired fasting glucose:  Over the holiday she admits to having done a lot of baking and eating sweets  Her A1c climbed to 6 4  She will reduce her intake of sweets  We discussed diabetes and the importance in controlling her weight and diet so that she does not get this diagnoses  Fasting sugar was 126  Left shoulder tendinitis:  Being followed by Orthopedics  She did have MRI which confirmed the tendinitis  She also has had steroid injection which has helped the symptoms significantly  She is also undergoing physical therapy  She reports making slow gains but is steadily improving  EMG studies of the left arm did find evidence of carpal tunnel syndrome          ALLERGIES:  Allergies   Allergen Reactions  Other      "English food"    Seasonal Ic  [Cholestatin]        CURRENT MEDICATIONS:    Current Outpatient Prescriptions:     Ascorbic Acid (VITAMIN C) 100 MG tablet, Take 1 tablet by mouth daily, Disp: , Rfl:     B Complex Vitamins (VITAMIN B COMPLEX) TABS, Take 1 tablet by mouth daily, Disp: , Rfl:     cholecalciferol (VITAMIN D3) 1,000 units tablet, Take 1 tablet by mouth daily, Disp: , Rfl:     Coenzyme Q10 (CO Q 10) 10 MG CAPS, Take 1 capsule by mouth daily, Disp: , Rfl:     Multiple Vitamins-Minerals (EYE VITAMINS & MINERALS PO), Take by mouth, Disp: , Rfl:     Multiple Vitamins-Minerals (MULTIVITAMIN ADULT PO), Take 1 tablet by mouth daily, Disp: , Rfl:     olmesartan (BENICAR) 40 mg tablet, Take 1 tablet (40 mg total) by mouth daily, Disp: 90 tablet, Rfl: 1    Omega-3 Fatty Acids (FISH OIL) 645 MG CAPS, Take 1 capsule by mouth daily, Disp: , Rfl:     naproxen (NAPROSYN) 500 mg tablet, Take 0 5 tablets (250 mg total) by mouth 2 (two) times a day with meals (Patient not taking: Reported on 1/8/2019 ), Disp: 60 tablet, Rfl: 0    ACTIVE PROBLEM LIST:  Patient Active Problem List   Diagnosis    Nephrolithiasis    Allergic rhinitis    Benign hypertension    Diverticulosis    Hiatal hernia    Hyperlipidemia    Hyperoxaluria    Age-related cataract of left eye    Depression    Impaired fasting glucose    Diverticulitis    Left shoulder tendonitis       PAST MEDICAL HISTORY:  Past Medical History:   Diagnosis Date    Hypertension     Kidney stones     Pneumonia        PAST SURGICAL HISTORY:  Past Surgical History:   Procedure Laterality Date    DILATION AND CURETTAGE OF UTERUS      KS CYSTO/URETERO/PYELOSCOPY, DX Right 2/15/2017    Procedure: URETEROSCOPY, STENT PLACEMENT ;  Surgeon: Misael Garcia MD;  Location: [facility];  Service: Urology    KS CARLOS Rogers CM Right 2/15/2017    Procedure: ACCESS INTERPOLAR CALYX, INSERTED TWO WIRES INTO BLADDER, NEPHROLITHOTOMY PERCUTANEOUS ;  Surgeon: Amy Brantley MD;  Location: [facility];  Service: Urology       FAMILY HISTORY:  Family History   Problem Relation Age of Onset    Cancer Mother     Colon cancer Mother         CARCINOMA     Alzheimer's disease Father     Urinary tract infection Sister     Cancer Brother         BLADDER    Stroke Maternal Grandfather         CVA       SOCIAL HISTORY:  Social History     Social History    Marital status: /Civil Union     Spouse name: N/A    Number of children: 5    Years of education: N/A     Occupational History    [employer]       Social History Main Topics    Smoking status: Former Smoker     Packs/day: 1 00     Years: 20 00    Smokeless tobacco: Former User     Quit date: 2007      Comment: smokes "very rarely"    Alcohol use Yes      Comment: social <2 GLASSES OF WINE PER WEEK     Drug use: No    Sexual activity: No     Other Topics Concern    Not on file     Social History Narrative    ACTIVITIES -- COMPUTERS, READING     BRUSHES TEETH TWICE A DAY     DAILY CAFFEINE CONSUMPTION 1 SERVING A DAY     DENTAL CARE REGULARLY           Review of Systems   Constitutional: Negative for activity change, chills, fatigue and fever  HENT: Negative for congestion  Eyes: Negative for discharge  Respiratory: Negative for cough, chest tightness and shortness of breath  Cardiovascular: Negative for chest pain, palpitations and leg swelling  Gastrointestinal: Negative for abdominal pain  Genitourinary: Negative for difficulty urinating  Musculoskeletal: Positive for arthralgias  Negative for myalgias  Skin: Negative for rash  Allergic/Immunologic: Negative for immunocompromised state  Neurological: Negative for dizziness, syncope, weakness, light-headedness and headaches  Hematological: Negative for adenopathy  Does not bruise/bleed easily  Psychiatric/Behavioral: Negative for dysphoric mood   The patient is not nervous/anxious  Objective:  Vitals:    01/08/19 1050 01/08/19 1114   BP: 148/80 132/84   BP Location: Left arm Left arm   Patient Position: Sitting Sitting   Cuff Size: Adult    Pulse: 96    Resp: 18    SpO2: 98%    Weight: 74 8 kg (165 lb)    Height: 5' 3" (1 6 m)      Body mass index is 29 23 kg/m²  Physical Exam   Constitutional: She is oriented to person, place, and time  She appears well-developed and well-nourished  No distress  Neck: Neck supple  Carotid bruit is not present  No thyromegaly present  Cardiovascular: Normal rate, regular rhythm and normal heart sounds  Pulmonary/Chest: Effort normal and breath sounds normal    Musculoskeletal: She exhibits no edema  Tenderness still present at the left shoulder  Limitation motion to abduction and internal rotation with tenderness  Strength and reflexes are intact  No neurovascular compromise  Lymphadenopathy:     She has no cervical adenopathy  Neurological: She is alert and oriented to person, place, and time  Skin: Skin is warm and dry  No rash noted  Psychiatric: She has a normal mood and affect  Her behavior is normal    Nursing note and vitals reviewed          RESULTS:    Recent Results (from the past 1008 hour(s))   Comprehensive metabolic panel    Collection Time: 01/02/19  8:51 AM   Result Value Ref Range    Sodium 141 136 - 145 mmol/L    Potassium 4 5 3 5 - 5 3 mmol/L    Chloride 105 100 - 108 mmol/L    CO2 28 21 - 32 mmol/L    ANION GAP 8 4 - 13 mmol/L    BUN 28 (H) 5 - 25 mg/dL    Creatinine 0 89 0 60 - 1 30 mg/dL    Glucose, Fasting 126 (H) 65 - 99 mg/dL    Calcium 9 1 8 3 - 10 1 mg/dL    AST 13 5 - 45 U/L    ALT 18 12 - 78 U/L    Alkaline Phosphatase 68 46 - 116 U/L    Total Protein 7 2 6 4 - 8 2 g/dL    Albumin 3 1 (L) 3 5 - 5 0 g/dL    Total Bilirubin 0 30 0 20 - 1 00 mg/dL    eGFR 65 ml/min/1 73sq m   Lipid panel    Collection Time: 01/02/19  8:51 AM   Result Value Ref Range    Cholesterol 299 (H) 50 - 200 mg/dL Triglycerides 70 <=150 mg/dL    HDL, Direct 97 (H) 40 - 60 mg/dL    LDL Calculated 188 (H) 0 - 100 mg/dL    Non-HDL-Chol (CHOL-HDL) 202 mg/dl   Hemoglobin A1C    Collection Time: 01/02/19  8:51 AM   Result Value Ref Range    Hemoglobin A1C 6 4 (H) 4 2 - 6 3 %     mg/dl       This note was created with voice recognition software  Phonic, grammatical and spelling errors may be present within the note as a result

## 2019-01-11 ENCOUNTER — OFFICE VISIT (OUTPATIENT)
Dept: PHYSICAL THERAPY | Facility: CLINIC | Age: 74
End: 2019-01-11
Payer: MEDICARE

## 2019-01-11 DIAGNOSIS — M25.512 ACUTE PAIN OF LEFT SHOULDER: Primary | ICD-10-CM

## 2019-01-11 PROCEDURE — 97110 THERAPEUTIC EXERCISES: CPT

## 2019-01-11 PROCEDURE — 97140 MANUAL THERAPY 1/> REGIONS: CPT

## 2019-01-11 NOTE — PROGRESS NOTES
Daily Note     Today's date: 2019  Patient name: Maria Isabel Han  : 1945  MRN: [id number]  Referring provider: Marco A Lynch DO  Dx:   Encounter Diagnosis     ICD-10-CM    1  Acute pain of left shoulder M25 512                   Subjective: "I am so happy Im feeling better, not great but better"        Objective: See treatment diary below      Pain in 3/10      Assessment: Tolerated treatment well  Patient exhibited good technique with therapeutic exercises    Notable improvement in ROM  End range pain with abduction evident            Plan: Continue per plan of care       ecialty Daily Treatment Diary      Manual  18  86-61-49 19   Left shoulder 15  15 min  15'  12' 13                                                                 Exercise Diary  18  60-05-05 19   pendulums  30  30x each  30x  30x  30   pulleys  30  10x3" Flex  10x 3"  30x  Flex/abd  30   Cane AAROM  10 each  10x3" each  Flex, Abd, Ext  10x 3"       Shrugs/retract  2#  30  2x10 2#  20x 1 #  2#  30x  2#  30   Bicep curls  2#  30  2x10 2#  20x 1#  2#  30x  2#   tricep ext             Bent over row 2#    30           Finger ladder 10 each flex   abd    20x wall slides  20x  flex 20   LTP/MTP  20red  2x10 Red  Red 30x  red  30x  gr  30   t-band ER/IR  20red  2x10 Red   Red 30x  red  30x  gr  30   t-band Add  20   red      red  30x  gr  30    UBE  NV  120 rpm  3'                                                                                                                               Modalities 18  83-25-53 19   Ultrasound Left Shld declined  1 3 w/cm2 10 min 10' 1 3 chambers/cm2  declined  10'

## 2019-01-14 ENCOUNTER — APPOINTMENT (OUTPATIENT)
Dept: PHYSICAL THERAPY | Facility: CLINIC | Age: 74
End: 2019-01-14
Payer: MEDICARE

## 2019-01-18 ENCOUNTER — APPOINTMENT (OUTPATIENT)
Dept: PHYSICAL THERAPY | Facility: CLINIC | Age: 74
End: 2019-01-18
Payer: MEDICARE

## 2019-01-23 ENCOUNTER — OFFICE VISIT (OUTPATIENT)
Dept: PHYSICAL THERAPY | Facility: CLINIC | Age: 74
End: 2019-01-23
Payer: MEDICARE

## 2019-01-23 DIAGNOSIS — M25.512 ACUTE PAIN OF LEFT SHOULDER: Primary | ICD-10-CM

## 2019-01-23 PROCEDURE — 97035 APP MDLTY 1+ULTRASOUND EA 15: CPT

## 2019-01-23 PROCEDURE — 97140 MANUAL THERAPY 1/> REGIONS: CPT

## 2019-01-23 PROCEDURE — 97110 THERAPEUTIC EXERCISES: CPT

## 2019-01-23 NOTE — PROGRESS NOTES
Daily Note     Today's date: 2019  Patient name: Cherelle Staples  : 1945  MRN: [id number]  Referring provider: Daquan Barajas DO  Dx:   Encounter Diagnosis     ICD-10-CM    1  Acute pain of left shoulder M25 512                   Subjective: "Its still a little sore"        Objective: See treatment diary below n  Pain in 3-4/10      Assessment: Tolerated treatment well  Patient exhibited good technique with therapeutic exercises     Patient reports improved s/s following previous injection with some pain returning  States most pain is with "changing shirt" but improved function overall      Modified session today with reps, tolerated well      Plan: Continue per plan of care       ecialty Daily Treatment Diary      Manual  18   Left shoulder 15  15 min  15'  12' 14                                                                 Exercise Diary  18  94-51-79 19   pendulums  30  30x each  30x  30x  20   pulleys  30  10x3" Flex  10x 3"  30x  Flex/abd  20   Cane AAROM  10 each  10x3" each  Flex, Abd, Ext  10x 3"       Shrugs/retract  2#  30  2x10 2#  20x 1 #  2#  30x  2#  20   Bicep curls  2#  30  2x10 2#  20x 1#  2#  30x  2#20   tricep ext             Bent over row 2#    30           Finger ladder 10 each flex   abd    20x wall slides  20x  flex 3x   2 way   LTP/MTP  20red  2x10 Red  Red 30x  red  30x  gr  20   t-band ER/IR  20red  2x10 Red   Red 30x  red  30x  gr  20   t-band Add  20   red      red  30x  gr  20    UBE  NV  120 rpm  3'      120  3'                                                                                                                         Modalities 18  69-97-22 19   Ultrasound Left Shld declined  1 3 w/cm2 10 min 10' 1 3 chambers/cm2  declined  10  1 5 w/cm

## 2019-01-25 ENCOUNTER — OFFICE VISIT (OUTPATIENT)
Dept: PHYSICAL THERAPY | Facility: CLINIC | Age: 74
End: 2019-01-25
Payer: MEDICARE

## 2019-01-25 DIAGNOSIS — M25.512 ACUTE PAIN OF LEFT SHOULDER: Primary | ICD-10-CM

## 2019-01-25 PROCEDURE — 97140 MANUAL THERAPY 1/> REGIONS: CPT

## 2019-01-25 PROCEDURE — 97035 APP MDLTY 1+ULTRASOUND EA 15: CPT

## 2019-01-25 PROCEDURE — 97110 THERAPEUTIC EXERCISES: CPT

## 2019-01-25 NOTE — PROGRESS NOTES
Daily Note     Today's date: 2019  Patient name: Rafael Torres  : 1945  MRN: [id number]  Referring provider: Mahi Garcia DO  Dx:   Encounter Diagnosis     ICD-10-CM    1  Acute pain of left shoulder M25 512                   Subjective: "Im better but still have pain with certain movements'        Objective: See treatment diary below   Pain in -4/10 depending on activity      Assessment: Tolerated treatment well  Patient exhibited good technique with therapeutic exercises    End range pain with MT   Evident  ROM improved; patient reports improved functional abilities at home          Plan: Continue per plan of care      ecialty Daily Treatment Diary      Manual  1/25/19 1/11/19 12/28/18  10-59-18 1/23/19   Left shoulder 15  15 min  15'  12' 14                                                                 Exercise Diary  1831 19   pendulums  30  30x each  30x  30x  20   pulleys  30  10x3" Flex  10x 3"  30x  Flex/abd  20   Cane AAROM  10 each  10x3" each  Flex, Abd, Ext  10x 3"       Shrugs/retract 3#  30    2x10 2#  20x 1 #  2#  30x  2#  20   Bicep curls 3#  20  2x10 2#  20x 1#  2#  30x  2#20   tricep ext             Bent over row 3#  20           Finger ladder 10 each flex   abd    20x wall slides  20x  flex 3x   2 way   LTP/MTP  20red  2x10 Red  Red 30x  red  30x  gr  20   t-band ER/IR  20red  2x10 Red   Red 30x  red  30x  gr  20   t-band Add  20   red      red  30x  gr  20    UBE    95 rpm  120 rpm  3'      120  3'                                                                                                                         Modalities 18  86-64-57 19   Ultrasound Left Shld 10     1 5 w/cm2  1 3 w/cm2 10 min 10' 1 3 chambers/cm2  declined  10  1 5 w/cm

## 2019-01-28 ENCOUNTER — APPOINTMENT (OUTPATIENT)
Dept: PHYSICAL THERAPY | Facility: CLINIC | Age: 74
End: 2019-01-28
Payer: MEDICARE

## 2019-02-19 NOTE — PROGRESS NOTES
PT DISCHARGE     Today's date: 2019  Patient name: Noe Smalls  : 1945  MRN: [id number]  Referring provider: Ashwini Dobbs DO  Dx:   Encounter Diagnosis     ICD-10-CM    1  Acute pain of left shoulder M25 512        Start Time: 855  Stop Time: 1000  Total time in clinic (min): 65 minutes    Assessment  Assessment details: Pt presented with Left Shoulder pain that began 2018 following flu vaccination  Reported pain throughout LUE following injection  Pt did well with PT tx and cortisone injection to shoulder  She reported decreased symptoms and improved mobility  Continued minimal symptoms noted with activity  Pt last attended session was on 19  She did not return after that time  D/C PT services  Impairments: abnormal or restricted ROM, activity intolerance, impaired physical strength, lacks appropriate home exercise program and pain with function     Prognosis: good    Goals  ST  Decrease pain 50% 4 wk  2  Increase shoulder ROM by 30-40 degrees 4 wk  3  Increase shoulder strength to 4/5 4 wk  Pt will report no difficulty with activity below shoulder level 4 wk  LT  Pt will report no pain 8 wk  2  Increase shoulder ROM to WNL 8 wk  3  Increase shoulder strength to WNL 8 wk  Pt will report no limitations with ADL's 8 wk    Plan  Plan details: D/C PT services  Findings per initial evaluation   Patient would benefit from: PT eval  Planned modality interventions: cryotherapy, thermotherapy: hydrocollator packs, ultrasound and unattended electrical stimulation  Planned therapy interventions: joint mobilization, manual therapy, strengthening, stretching, therapeutic activities, therapeutic exercise, home exercise program, functional ROM exercises and flexibility        Subjective Evaluation    History of Present Illness  Mechanism of injury: Pain began left shoulder 2018 following flu shot  Reports increased swelling following injection    Has had continued pain since that time  Pain radiates from shoulder to fingers  Pain sharp with movement  No pain at rest   Pain wakes her consistently from sleep  Reports mild "picking" sensation in LUE  Pain consistently originate left humeral region with movement  Pt currently day 4 of prednisone which is helping a little per pt  Reports intermittent clicking in shoulder non-painful  Difficulty with all ADL's due to symptoms  Pain  Current pain ratin  At best pain ratin  At worst pain ratin  Location: LUE  Quality: sharp    Hand dominance: right  Life stress: Works at [employer]  Difficulty getting in/out of car              Objective     Tenderness     Additional Tenderness Details  Tender to palpation mm belly left bicep     Active Range of Motion   Left Shoulder   Flexion: 80 degrees   Abduction: 45 degrees     Passive Range of Motion   Left Shoulder   Flexion: 104 degrees with pain  Abduction: 80 degrees with pain  External rotation 45°: 18 degrees with pain  Internal rotation 45°: 65 degrees     Strength/Myotome Testing     Left Shoulder     Planes of Motion   Flexion: 3-   Abduction: 3-   External rotation at 0°: 3-   Internal rotation at 0°: 3-     Isolated Muscles   Biceps: 3+   Triceps: 3+     Tests     Additional Tests Details  Pain limiting ability to participate with special tests      Flowsheet Rows      Most Recent Value   PT/OT G-Codes   Current Score  60   Projected Score  66

## 2019-02-21 ENCOUNTER — OFFICE VISIT (OUTPATIENT)
Dept: INTERNAL MEDICINE CLINIC | Facility: CLINIC | Age: 74
End: 2019-02-21
Payer: MEDICARE

## 2019-02-21 VITALS
HEIGHT: 63 IN | BODY MASS INDEX: 29.23 KG/M2 | DIASTOLIC BLOOD PRESSURE: 84 MMHG | HEART RATE: 90 BPM | RESPIRATION RATE: 16 BRPM | OXYGEN SATURATION: 97 % | SYSTOLIC BLOOD PRESSURE: 136 MMHG | WEIGHT: 165 LBS

## 2019-02-21 DIAGNOSIS — L24.9 IRRITANT CONTACT DERMATITIS, UNSPECIFIED TRIGGER: Primary | ICD-10-CM

## 2019-02-21 PROCEDURE — 99213 OFFICE O/P EST LOW 20 MIN: CPT | Performed by: PHYSICIAN ASSISTANT

## 2019-02-21 RX ORDER — TRIAMCINOLONE ACETONIDE 5 MG/G
CREAM TOPICAL 2 TIMES DAILY
Qty: 15 G | Refills: 2 | Status: SHIPPED | OUTPATIENT
Start: 2019-02-21 | End: 2020-02-18 | Stop reason: ALTCHOICE

## 2019-02-21 NOTE — PROGRESS NOTES
Assessment/Plan:   Patient Instructions   Will have patient apply topical triamcinolone cream to rash on face, twice Daily until gone  BMI Counseling: Body mass index is 29 23 kg/m²  Discussed the patient's BMI with her  The BMI is above average  BMI counseling and education was provided to the patient  Nutrition recommendations include reducing portion sizes and decreasing overall calorie intake  Exercise recommendations include exercising 3-5 times per week  Return for Next scheduled follow up  Diagnoses and all orders for this visit:    Irritant contact dermatitis, unspecified trigger  -     triamcinolone (KENALOG) 0 5 % cream; Apply topically 2 (two) times a day          Subjective:      Patient ID: Shayy Lorenzo is a 68 y o  female  Acute visit    Patient presents with a reddish patch like rash on her left cheek that has been present for approximately 3 days  It is itchy  No where else on her body  Topical antibiotic ointments have not helped  No fever chills or constitutional symptoms        ALLERGIES:  Allergies   Allergen Reactions    Other      "[place] food"    Seasonal Ic  [Cholestatin]        CURRENT MEDICATIONS:    Current Outpatient Medications:     Ascorbic Acid (VITAMIN C) 100 MG tablet, Take 1 tablet by mouth daily, Disp: , Rfl:     B Complex Vitamins (VITAMIN B COMPLEX) TABS, Take 1 tablet by mouth daily, Disp: , Rfl:     cholecalciferol (VITAMIN D3) 1,000 units tablet, Take 1 tablet by mouth daily, Disp: , Rfl:     Coenzyme Q10 (CO Q 10) 10 MG CAPS, Take 1 capsule by mouth daily, Disp: , Rfl:     Multiple Vitamins-Minerals (EYE VITAMINS & MINERALS PO), Take by mouth, Disp: , Rfl:     Multiple Vitamins-Minerals (MULTIVITAMIN ADULT PO), Take 1 tablet by mouth daily, Disp: , Rfl:     olmesartan (BENICAR) 40 mg tablet, Take 1 tablet (40 mg total) by mouth daily, Disp: 90 tablet, Rfl: 1    Omega-3 Fatty Acids (FISH OIL) 645 MG CAPS, Take 1 capsule by mouth daily, Disp: , Rfl:     naproxen (NAPROSYN) 500 mg tablet, Take 0 5 tablets (250 mg total) by mouth 2 (two) times a day with meals (Patient not taking: Reported on 1/8/2019 ), Disp: 60 tablet, Rfl: 0    triamcinolone (KENALOG) 0 5 % cream, Apply topically 2 (two) times a day, Disp: 15 g, Rfl: 2    ACTIVE PROBLEM LIST:  Patient Active Problem List   Diagnosis    Nephrolithiasis    Allergic rhinitis    Benign hypertension    Diverticulosis    Hiatal hernia    Hyperlipidemia    Hyperoxaluria    Age-related cataract of left eye    Depression    Impaired fasting glucose    Diverticulitis    Left shoulder tendonitis    Carpal tunnel syndrome of left wrist       PAST MEDICAL HISTORY:  Past Medical History:   Diagnosis Date    Hypertension     Kidney stones     Pneumonia        PAST SURGICAL HISTORY:  Past Surgical History:   Procedure Laterality Date    DILATION AND CURETTAGE OF UTERUS      MI CYSTO/URETERO/PYELOSCOPY, DX Right 2/15/2017    Procedure: URETEROSCOPY, STENT PLACEMENT ;  Surgeon: Iftikhar Villalpando MD;  [facility];  Service: Urology    MI PERCUT [address] KID STONE,2+ CM Right 2/15/2017    Procedure: ACCESS INTERPOLAR CALYX, INSERTED TWO WIRES INTO BLADDER, NEPHROLITHOTOMY  PERCUTANEOUS ;  Surgeon: Iftikhar Villalpando MD;  [facility];  Service: Urology       FAMILY HISTORY:  Family History   Problem Relation Age of Onset    Cancer Mother     Colon cancer Mother         CARCINOMA     Alzheimer's disease Father     Urinary tract infection Sister     Cancer Brother         BLADDER    Stroke Maternal Grandfather         CVA       SOCIAL HISTORY:  Social History     Socioeconomic History    Marital status: /Civil Union     Spouse name: Not on file    Number of children: 5    Years of education: Not on file    Highest education level: Not on file   Occupational History    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 resource strain: Not on file    Food insecurity:     Worry: Not on file     Inability: Not on file    Transportation needs:     Medical: Not on file     Non-medical: Not on file   Tobacco Use    Smoking status: Former Smoker     Packs/day: 1 00     Years: 20 00     Pack years: 20 00    Smokeless tobacco: Former User     Quit date: 2007    Tobacco comment: smokes "very rarely"   Substance and Sexual Activity    Alcohol use: Yes     Comment: social <2 GLASSES OF WINE PER WEEK     Drug use: No    Sexual activity: Never     Partners: Male   Lifestyle    Physical activity:     Days per week: Not on file     Minutes per session: Not on file    Stress: Not on file   Relationships    Social connections:     Talks on phone: Not on file     Gets together: Not on file     Attends Jain service: Not on file     Active member of club or organization: Not on file     Attends meetings of clubs or organizations: Not on file     Relationship status: Not on file    Intimate partner violence:     Fear of current or ex partner: Not on file     Emotionally abused: Not on file     Physically abused: Not on file     Forced sexual activity: Not on file   Other Topics Concern    Not on file   Social History Narrative    ACTIVITIES -- COMPUTERS, READING     [address]       Review of Systems   Constitutional: Negative for activity change, chills, fatigue and fever  HENT: Negative for congestion  Eyes: Negative for discharge  Respiratory: Negative for cough, chest tightness and shortness of breath  Cardiovascular: Negative for chest pain, palpitations and leg swelling  Gastrointestinal: Negative for abdominal pain, diarrhea, nausea and vomiting  Genitourinary: Negative for difficulty urinating  Skin: Positive for rash  Allergic/Immunologic: Negative for immunocompromised state     Neurological: Negative for dizziness, syncope, weakness, light-headedness and headaches  Hematological: Negative for adenopathy  Does not bruise/bleed easily  Psychiatric/Behavioral: Negative for dysphoric mood  The patient is not nervous/anxious  Objective:  Vitals:    02/21/19 1540 02/21/19 1635   BP: 146/82 136/84   BP Location:  Left arm   Patient Position:  Sitting   Pulse: (!) 109 90   Resp: 16    SpO2: 97%    Weight: 74 8 kg (165 lb)    Height: 5' 3" (1 6 m)      Body mass index is 29 23 kg/m²  Physical Exam   Constitutional: She is oriented to person, place, and time  She appears well-developed and well-nourished  No distress  Cardiovascular: Normal rate, regular rhythm and normal heart sounds  Pulmonary/Chest: Effort normal and breath sounds normal    Lymphadenopathy:     She has no cervical adenopathy  Neurological: She is alert and oriented to person, place, and time  Skin: Skin is warm and dry  Rash (Flat, slightly reddish 2 x 2 cm rash present on left cheek  , no significant papules or vesicles, no other lesions noted) noted  Psychiatric: She has a normal mood and affect  Her behavior is normal    Nursing note and vitals reviewed  RESULTS:    No results found for this or any previous visit (from the past 1008 hour(s))  This note was created with voice recognition software  Phonic, grammatical and spelling errors may be present within the note as a result

## 2019-02-26 DIAGNOSIS — I10 BENIGN HYPERTENSION: ICD-10-CM

## 2019-02-26 RX ORDER — OLMESARTAN MEDOXOMIL 40 MG/1
TABLET ORAL
Qty: 90 TABLET | Refills: 1 | Status: SHIPPED | OUTPATIENT
Start: 2019-02-26 | End: 2019-07-09 | Stop reason: SDUPTHER

## 2019-03-21 ENCOUNTER — TELEPHONE (OUTPATIENT)
Dept: INTERNAL MEDICINE CLINIC | Facility: CLINIC | Age: 74
End: 2019-03-21

## 2019-03-21 NOTE — TELEPHONE ENCOUNTER
Have patient check with her pharmacy and see if they have Benicar 20 mg tablets, at which time she could take 2 of them until her Benicar 40 is available

## 2019-03-21 NOTE — TELEPHONE ENCOUNTER
Medication olmesartan (BENICAR) 40 mg tablet is on back order at [facility], pt is requesting to please send a alternative

## 2019-03-22 DIAGNOSIS — I10 BENIGN HYPERTENSION: Primary | ICD-10-CM

## 2019-03-22 RX ORDER — LOSARTAN POTASSIUM 100 MG/1
100 TABLET ORAL DAILY
Qty: 30 TABLET | Refills: 1 | Status: SHIPPED | OUTPATIENT
Start: 2019-03-22 | End: 2019-06-06 | Stop reason: SDUPTHER

## 2019-03-22 NOTE — TELEPHONE ENCOUNTER
Patient asked if you could send in a script for Losartan they did not have the 20 mg tablets of the Benicar due to it is on back order  Please advise    Oscar Cassette Oscar Meade

## 2019-03-22 NOTE — TELEPHONE ENCOUNTER
Prescription was sent for this patient to her pharmacy for losartan 100 mg which is the equivalent dose to Benicar 40 mg

## 2019-03-28 ENCOUNTER — TELEPHONE (OUTPATIENT)
Dept: INTERNAL MEDICINE CLINIC | Facility: CLINIC | Age: 74
End: 2019-03-28

## 2019-03-28 NOTE — TELEPHONE ENCOUNTER
Spoke with [facility] they only have 20 mg in stock qty of 30 which is a 15 day supply   Nova  Is ok with doing the 20mg 2x daily  I did give the pharmacy the verbal to go ahead and dispense the medication for Nova in hopes that the 40mg will be off of back order in 15 days

## 2019-03-28 NOTE — TELEPHONE ENCOUNTER
Verenice called in and stated that the new medication that you started her on has her pressure at 150/100  She also stated that she is having headaches  She denies any SOB, or chest pains at this time   She would like to know if you can switch her to something else because this is not working for her  Please advise

## 2019-03-28 NOTE — TELEPHONE ENCOUNTER
The problem is is that her Benicar 40 mg, was apparently back ordered  Can we check to see if other pharmacies have this medication, or patient can do this, and if they do then she can get the medication at that pharmacy

## 2019-04-17 DIAGNOSIS — I10 BENIGN HYPERTENSION: ICD-10-CM

## 2019-04-17 RX ORDER — LOSARTAN POTASSIUM 100 MG/1
TABLET ORAL
Qty: 30 TABLET | Refills: 0 | OUTPATIENT
Start: 2019-04-17

## 2019-05-02 ENCOUNTER — OFFICE VISIT (OUTPATIENT)
Dept: DERMATOLOGY | Facility: CLINIC | Age: 74
End: 2019-05-02
Payer: MEDICARE

## 2019-05-02 DIAGNOSIS — L29.9 PRURITUS: ICD-10-CM

## 2019-05-02 DIAGNOSIS — L71.0 PERIORAL DERMATITIS: Primary | ICD-10-CM

## 2019-05-02 DIAGNOSIS — Z13.89 SCREENING FOR SKIN CONDITION: ICD-10-CM

## 2019-05-02 DIAGNOSIS — L82.1 SEBORRHEIC KERATOSIS: ICD-10-CM

## 2019-05-02 PROCEDURE — 99203 OFFICE O/P NEW LOW 30 MIN: CPT | Performed by: DERMATOLOGY

## 2019-05-02 RX ORDER — OLMESARTAN MEDOXOMIL 40 MG/1
40 TABLET ORAL DAILY
COMMUNITY
End: 2019-07-09 | Stop reason: SDUPTHER

## 2019-05-02 RX ORDER — AMPICILLIN TRIHYDRATE 250 MG
CAPSULE ORAL
COMMUNITY
End: 2020-12-18 | Stop reason: ALTCHOICE

## 2019-05-02 RX ORDER — DOXYCYCLINE HYCLATE 50 MG/1
50 CAPSULE ORAL 2 TIMES DAILY
Qty: 60 CAPSULE | Refills: 1 | Status: SHIPPED | OUTPATIENT
Start: 2019-05-02 | End: 2019-06-01

## 2019-06-06 ENCOUNTER — TELEPHONE (OUTPATIENT)
Dept: INTERNAL MEDICINE CLINIC | Facility: CLINIC | Age: 74
End: 2019-06-06

## 2019-06-06 DIAGNOSIS — I10 BENIGN HYPERTENSION: ICD-10-CM

## 2019-06-06 RX ORDER — LOSARTAN POTASSIUM 100 MG/1
100 TABLET ORAL DAILY
Qty: 30 TABLET | Refills: 1 | Status: SHIPPED | OUTPATIENT
Start: 2019-06-06 | End: 2019-07-09 | Stop reason: SDUPTHER

## 2019-07-09 DIAGNOSIS — I10 BENIGN HYPERTENSION: ICD-10-CM

## 2019-07-09 RX ORDER — OLMESARTAN MEDOXOMIL 40 MG/1
TABLET ORAL
Qty: 90 TABLET | Refills: 1 | Status: SHIPPED | OUTPATIENT
Start: 2019-07-09 | End: 2020-01-06

## 2019-07-09 NOTE — TELEPHONE ENCOUNTER
Let's please find out which when she is taking either the olmesartan or the losartan so we order the correct medication

## 2019-07-18 ENCOUNTER — TELEPHONE (OUTPATIENT)
Dept: INTERNAL MEDICINE CLINIC | Facility: CLINIC | Age: 74
End: 2019-07-18

## 2019-07-18 DIAGNOSIS — Z12.39 SCREENING FOR MALIGNANT NEOPLASM OF BREAST: Primary | ICD-10-CM

## 2019-08-08 ENCOUNTER — HOSPITAL ENCOUNTER (OUTPATIENT)
Dept: RADIOLOGY | Facility: HOSPITAL | Age: 74
Discharge: HOME | End: 2019-08-08
Payer: MEDICARE

## 2019-08-08 ENCOUNTER — APPOINTMENT (OUTPATIENT)
Dept: LAB | Facility: HOSPITAL | Age: 74
End: 2019-08-08
Payer: MEDICARE

## 2019-08-08 ENCOUNTER — TRANSCRIBE ORDERS (OUTPATIENT)
Dept: ADMINISTRATIVE | Facility: HOSPITAL | Age: 74
End: 2019-08-08

## 2019-08-08 DIAGNOSIS — E78.2 MIXED HYPERLIPIDEMIA: ICD-10-CM

## 2019-08-08 DIAGNOSIS — R73.01 IMPAIRED FASTING GLUCOSE: ICD-10-CM

## 2019-08-08 DIAGNOSIS — N20.0 URIC ACID NEPHROLITHIASIS: Primary | ICD-10-CM

## 2019-08-08 DIAGNOSIS — N20.0 URIC ACID NEPHROLITHIASIS: ICD-10-CM

## 2019-08-08 LAB
ALBUMIN SERPL BCP-MCNC: 3.3 G/DL (ref 3.5–5)
ALP SERPL-CCNC: 70 U/L (ref 46–116)
ALT SERPL W P-5'-P-CCNC: 16 U/L (ref 12–78)
ANION GAP SERPL CALCULATED.3IONS-SCNC: 7 MMOL/L (ref 4–13)
AST SERPL W P-5'-P-CCNC: 12 U/L (ref 5–45)
BILIRUB SERPL-MCNC: 0.3 MG/DL (ref 0.2–1)
BUN SERPL-MCNC: 22 MG/DL (ref 5–25)
CALCIUM SERPL-MCNC: 9.4 MG/DL (ref 8.3–10.1)
CHLORIDE SERPL-SCNC: 104 MMOL/L (ref 100–108)
CHOLEST SERPL-MCNC: 261 MG/DL (ref 50–200)
CO2 SERPL-SCNC: 30 MMOL/L (ref 21–32)
CREAT SERPL-MCNC: 1.04 MG/DL (ref 0.6–1.3)
EST. AVERAGE GLUCOSE BLD GHB EST-MCNC: 117 MG/DL
GFR SERPL CREATININE-BSD FRML MDRD: 53 ML/MIN/1.73SQ M
GLUCOSE P FAST SERPL-MCNC: 97 MG/DL (ref 65–99)
HBA1C MFR BLD: 5.7 % (ref 4.2–6.3)
HDLC SERPL-MCNC: 94 MG/DL (ref 40–60)
LDLC SERPL CALC-MCNC: 157 MG/DL (ref 0–100)
NONHDLC SERPL-MCNC: 167 MG/DL
POTASSIUM SERPL-SCNC: 4 MMOL/L (ref 3.5–5.3)
PROT SERPL-MCNC: 7.6 G/DL (ref 6.4–8.2)
SODIUM SERPL-SCNC: 141 MMOL/L (ref 136–145)
TRIGL SERPL-MCNC: 51 MG/DL

## 2019-08-08 PROCEDURE — 83036 HEMOGLOBIN GLYCOSYLATED A1C: CPT

## 2019-08-08 PROCEDURE — 80053 COMPREHEN METABOLIC PANEL: CPT

## 2019-08-08 PROCEDURE — 36415 COLL VENOUS BLD VENIPUNCTURE: CPT

## 2019-08-08 PROCEDURE — 74018 RADEX ABDOMEN 1 VIEW: CPT

## 2019-08-08 PROCEDURE — 80061 LIPID PANEL: CPT

## 2019-08-15 ENCOUNTER — OFFICE VISIT (OUTPATIENT)
Dept: INTERNAL MEDICINE CLINIC | Facility: CLINIC | Age: 74
End: 2019-08-15
Payer: MEDICARE

## 2019-08-15 VITALS
BODY MASS INDEX: 28.35 KG/M2 | HEART RATE: 85 BPM | RESPIRATION RATE: 16 BRPM | WEIGHT: 160 LBS | OXYGEN SATURATION: 99 % | SYSTOLIC BLOOD PRESSURE: 134 MMHG | HEIGHT: 63 IN | DIASTOLIC BLOOD PRESSURE: 82 MMHG

## 2019-08-15 DIAGNOSIS — K57.90 DIVERTICULOSIS: ICD-10-CM

## 2019-08-15 DIAGNOSIS — N20.0 NEPHROLITHIASIS: ICD-10-CM

## 2019-08-15 DIAGNOSIS — H65.02 NON-RECURRENT ACUTE SEROUS OTITIS MEDIA OF LEFT EAR: ICD-10-CM

## 2019-08-15 DIAGNOSIS — Z12.39 SCREENING FOR BREAST CANCER: ICD-10-CM

## 2019-08-15 DIAGNOSIS — M77.8 LEFT SHOULDER TENDONITIS: ICD-10-CM

## 2019-08-15 DIAGNOSIS — R73.01 IMPAIRED FASTING GLUCOSE: ICD-10-CM

## 2019-08-15 DIAGNOSIS — Z00.00 ENCOUNTER FOR MEDICARE ANNUAL WELLNESS EXAM: Primary | ICD-10-CM

## 2019-08-15 DIAGNOSIS — E78.2 MIXED HYPERLIPIDEMIA: ICD-10-CM

## 2019-08-15 DIAGNOSIS — Z11.59 NEED FOR HEPATITIS C SCREENING TEST: ICD-10-CM

## 2019-08-15 PROCEDURE — 99214 OFFICE O/P EST MOD 30 MIN: CPT | Performed by: PHYSICIAN ASSISTANT

## 2019-08-15 PROCEDURE — G0439 PPPS, SUBSEQ VISIT: HCPCS | Performed by: PHYSICIAN ASSISTANT

## 2019-08-15 RX ORDER — METHYLPREDNISOLONE 4 MG/1
TABLET ORAL
Qty: 21 TABLET | Refills: 0 | Status: SHIPPED | OUTPATIENT
Start: 2019-08-15 | End: 2020-02-18 | Stop reason: ALTCHOICE

## 2019-08-15 NOTE — PROGRESS NOTES
Assessment/Plan: For ongoing serous otitis will give trial of Medrol pack  This may also help patient's left deltoid tendinitis  Labs look good  No change in medications  Follow-up 6 months with repeat labs at that time  BMI Counseling: Body mass index is 28 34 kg/m²  Discussed the patient's BMI with her  The BMI is above average  BMI counseling and education was provided to the patient  Nutrition recommendations include reducing portion sizes and decreasing overall calorie intake  Exercise recommendations include exercising 3-5 times per week  Return in about 6 months (around 2/15/2020) for Next scheduled follow up  Diagnoses and all orders for this visit:    Encounter for Medicare annual wellness exam    Mixed hyperlipidemia  -     Comprehensive metabolic panel; Future  -     Lipid panel; Future    Nephrolithiasis  -     CBC and differential; Future    Left shoulder tendonitis    Impaired fasting glucose  -     Hemoglobin A1C; Future    Diverticulosis    Non-recurrent acute serous otitis media of left ear  -     methylPREDNISolone 4 MG tablet therapy pack; Use as directed on package    Screening for breast cancer  -     Mammo screening bilateral w 3d & cad; Future    Need for hepatitis C screening test  -     Hepatitis C antibody; Future          Subjective:      Patient ID: Brock Downing is a 68 y o  female  Follow-up    Hyperlipidemia:  Labs reviewed with patient, she does not want to be on a statin medication so did agree to start homeopathic red rice yeast   She has had improvement in her total cholesterol and LDL  HDL cholesterol remains very elevated  She denies any difficulty with the medication, no muscle aches or joint pain  I did discuss with her that this red rice yeast is actually the precursor to all the statin medications, therefore she should not have any side effects on statins  Still not wanting to start any prescription statin medication      Impaired fasting glucose:  A1c much improved to 5 7  Fasting blood sugar normal at 97  Nephrolithiasis:  Patient states she is having some hematuria again  She will get in touch with her urologist as he had suggested that she have lithotripsy  I did point out to her also that her EGFR was just slightly low, and that she should get in touch with Urology as soon as possible  Diverticulosis:  Will be do colonoscopy this year  Acute serous otitis left ear  Patient states 5 days ago she was having problems an acute hearing loss therefore went to local urgent care  She has been taking amoxicillin 875 mg twice a day for the past 5 days  She has not noted any improvement, and her hearing is much impaired  Denies other significant congestion symptoms        ALLERGIES:  Allergies   Allergen Reactions    Other      "[place]ian food"    Seasonal Ic  [Cholestatin]        CURRENT MEDICATIONS:    Current Outpatient Medications:     Ascorbic Acid (VITAMIN C) 100 MG tablet, Take 1 tablet by mouth daily, Disp: , Rfl:     B Complex Vitamins (VITAMIN B COMPLEX) TABS, Take 1 tablet by mouth daily, Disp: , Rfl:     cholecalciferol (VITAMIN D3) 1,000 units tablet, Take 1 tablet by mouth daily, Disp: , Rfl:     Coenzyme Q10 (CO Q 10) 10 MG CAPS, Take 1 capsule by mouth daily, Disp: , Rfl:     Multiple Vitamins-Minerals (EYE VITAMINS & MINERALS PO), Take by mouth, Disp: , Rfl:     Multiple Vitamins-Minerals (MULTIVITAMIN ADULT PO), Take 1 tablet by mouth daily, Disp: , Rfl:     olmesartan (BENICAR) 40 mg tablet, TAKE 1 TABLET BY MOUTH EVERY DAY, Disp: 90 tablet, Rfl: 1    Omega-3 Fatty Acids (FISH OIL) 645 MG CAPS, Take 1 capsule by mouth daily, Disp: , Rfl:     Red Yeast Rice 600 MG CAPS, Take by mouth, Disp: , Rfl:     methylPREDNISolone 4 MG tablet therapy pack, Use as directed on package, Disp: 21 tablet, Rfl: 0    naproxen (NAPROSYN) 500 mg tablet, Take 0 5 tablets (250 mg total) by mouth 2 (two) times a day with meals (Patient not taking: Reported on 1/8/2019 ), Disp: 60 tablet, Rfl: 0    triamcinolone (KENALOG) 0 5 % cream, Apply topically 2 (two) times a day (Patient not taking: Reported on 8/15/2019), Disp: 15 g, Rfl: 2    ACTIVE PROBLEM LIST:  Patient Active Problem List   Diagnosis    Nephrolithiasis    Allergic rhinitis    Benign hypertension    Diverticulosis    Hiatal hernia    Hyperlipidemia    Hyperoxaluria    Depression    Impaired fasting glucose    Diverticulitis    Left shoulder tendonitis    Carpal tunnel syndrome of left wrist       PAST MEDICAL HISTORY:  Past Medical History:   Diagnosis Date    Hypertension     Kidney stones     Pneumonia        PAST SURGICAL HISTORY:  Past Surgical History:   Procedure Laterality Date    DILATION AND CURETTAGE OF UTERUS      MT CYSTO/URETERO/PYELOSCOPY, DX Right 2/15/2017    Procedure: URETEROSCOPY, STENT PLACEMENT ;  Surgeon: Matilde Castaneda MD;  [facility];  Service: Urology    MT PERCUT [address] [facility] KID STONE,2+ CM Right 2/15/2017    Procedure: ACCESS INTERPOLAR CALYX, INSERTED TWO WIRES INTO BLADDER, NEPHROLITHOTOMY  PERCUTANEOUS ;  Surgeon: Matilde Castaneda MD;  [facility];  Service: Urology       FAMILY HISTORY:  Family History   Problem Relation Age of Onset    Cancer Mother     Colon cancer Mother         CARCINOMA     Melanoma Mother     Alzheimer's disease Father     Urinary tract infection Sister     Cancer Brother         BLADDER    Stroke Maternal Grandfather         CVA       SOCIAL HISTORY:  Social History     Socioeconomic History    Marital status: /Civil Union     Spouse name: Not on file    Number of children: 11    Years of education: Not on file    Highest education level: Not on file   Occupational History    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 [employer] Financial resource strain: Not on file    Food insecurity:     Worry: Not on file     Inability: Not on file   Root Transportation needs:     Medical: Not on file     Non-medical: Not on file   Tobacco Use    Smoking status: Former Smoker     Packs/day: 1 00     Years: 20 00     Pack years: 20 00    Smokeless tobacco: Former User     Quit date: 2007    Tobacco comment: smokes "very rarely"   Substance and Sexual Activity    Alcohol use: Yes     Comment: social <2 GLASSES OF WINE PER WEEK     Drug use: No    Sexual activity: Never     Partners: Male   Lifestyle    Physical activity:     Days per week: Not on file     Minutes per session: Not on file    Stress: Not on file   Relationships    Social connections:     Talks on phone: Not on file     Gets together: Not on file     Attends Sabianist service: Not on file     Active member of club or organization: Not on file     Attends meetings of clubs or organizations: Not on file     Relationship status: Not on file    Intimate partner violence:     Fear of current or ex partner: Not on file     Emotionally abused: Not on file     Physically abused: Not on file     Forced sexual activity: Not on file   Other Topics Concern    Not on file   Social History Narrative    ACTIVITIES -- COMPUTERS, READING     [address]       Review of Systems   Constitutional: Negative for activity change, chills, fatigue and fever  HENT: Positive for ear pain and postnasal drip  Negative for congestion, rhinorrhea, sinus pressure, sinus pain, sneezing, sore throat, trouble swallowing and voice change  Eyes: Negative for discharge, redness, itching and visual disturbance  Respiratory: Negative for cough, chest tightness and shortness of breath  Cardiovascular: Negative for chest pain, palpitations and leg swelling  Gastrointestinal: Negative for abdominal pain, blood in stool, constipation, diarrhea, nausea and vomiting  Endocrine: Negative for polydipsia, polyphagia and polyuria  Genitourinary: Positive for hematuria  Negative for difficulty urinating  Musculoskeletal: Negative for arthralgias and myalgias  Skin: Negative for rash  Allergic/Immunologic: Negative for immunocompromised state  Neurological: Negative for dizziness, syncope, weakness, light-headedness and headaches  Hematological: Negative for adenopathy  Does not bruise/bleed easily  Psychiatric/Behavioral: Negative for dysphoric mood, sleep disturbance and suicidal ideas  The patient is not nervous/anxious  Objective:  Vitals:    08/15/19 1312   BP: 134/82   BP Location: Left arm   Patient Position: Sitting   Cuff Size: Standard   Pulse: 85   Resp: 16   SpO2: 99%   Weight: 72 6 kg (160 lb)   Height: 5' 3" (1 6 m)     Body mass index is 28 34 kg/m²  Physical Exam   Constitutional: She is oriented to person, place, and time  She appears well-developed and well-nourished  No distress  HENT:   Right tympanic membrane appears within normal limits  Left tympanic membrane appears bulging with significantly distorted light reflex, but not significant visualization of fluid behind the TM   Eyes: Pupils are equal, round, and reactive to light  Conjunctivae and EOM are normal    Neck: Neck supple  No JVD present  Carotid bruit is not present  No thyromegaly present  Cardiovascular: Normal rate, regular rhythm and normal heart sounds  Pulmonary/Chest: Effort normal and breath sounds normal    Musculoskeletal: She exhibits no edema  Lymphadenopathy:     She has no cervical adenopathy  Neurological: She is alert and oriented to person, place, and time  A cranial nerve deficit (Decreased hearing left ear) is present  Skin: Skin is warm and dry  No rash noted  Psychiatric: She has a normal mood and affect  Her behavior is normal    Nursing note and vitals reviewed          RESULTS:    Recent Results (from the past 1008 hour(s))   Comprehensive metabolic panel    Collection Time: 08/08/19  9:20 AM Result Value Ref Range    Sodium 141 136 - 145 mmol/L    Potassium 4 0 3 5 - 5 3 mmol/L    Chloride 104 100 - 108 mmol/L    CO2 30 21 - 32 mmol/L    ANION GAP 7 4 - 13 mmol/L    BUN 22 5 - 25 mg/dL    Creatinine 1 04 0 60 - 1 30 mg/dL    Glucose, Fasting 97 65 - 99 mg/dL    Calcium 9 4 8 3 - 10 1 mg/dL    AST 12 5 - 45 U/L    ALT 16 12 - 78 U/L    Alkaline Phosphatase 70 46 - 116 U/L    Total Protein 7 6 6 4 - 8 2 g/dL    Albumin 3 3 (L) 3 5 - 5 0 g/dL    Total Bilirubin 0 30 0 20 - 1 00 mg/dL    eGFR 53 ml/min/1 73sq m   Hemoglobin A1C    Collection Time: 08/08/19  9:20 AM   Result Value Ref Range    Hemoglobin A1C 5 7 4 2 - 6 3 %     mg/dl   Lipid panel    Collection Time: 08/08/19  9:20 AM   Result Value Ref Range    Cholesterol 261 (H) 50 - 200 mg/dL    Triglycerides 51 <=150 mg/dL    HDL, Direct 94 (H) 40 - 60 mg/dL    LDL Calculated 157 (H) 0 - 100 mg/dL    Non-HDL-Chol (CHOL-HDL) 167 mg/dl       This note was created with voice recognition software  Phonic, grammatical and spelling errors may be present within the note as a result

## 2019-08-15 NOTE — PROGRESS NOTES
Assessment and Plan:   As per office visit note same day  Problem List Items Addressed This Visit     None      Visit Diagnoses     Screening for breast cancer    -  Primary    Relevant Orders    Mammo screening bilateral w 3d & cad    Need for hepatitis C screening test        Relevant Orders    Hepatitis C antibody         History of Present Illness:     Patient presents for Welcome to Medicare visit  Patient's questionnaire has been reviewed, clarified, and updated with her today  Patient Care Team:  Shashank Ingram MD as PCP - General (Internal Medicine)  Louise Shields MD as Consulting Physician (Nephrology)  Chano Lees MD as Consulting Physician (Urology)  Roopa Bond PA-C as Physician Assistant (Internal Medicine)     Review of Systems:     Review of Systems   Gastrointestinal: Negative for bowel incontinence  Psychiatric/Behavioral: The patient is not nervous/anxious        As per office visit note same day   Problem List:     Patient Active Problem List   Diagnosis    Nephrolithiasis    Allergic rhinitis    Benign hypertension    Diverticulosis    Hiatal hernia    Hyperlipidemia    Hyperoxaluria    Age-related cataract of left eye    Depression    Impaired fasting glucose    Diverticulitis    Left shoulder tendonitis    Carpal tunnel syndrome of left wrist      Past Medical and Surgical History:     Past Medical History:   Diagnosis Date    Hypertension     Kidney stones     Pneumonia      Past Surgical History:   Procedure Laterality Date    DILATION AND CURETTAGE OF UTERUS      VT CYSTO/URETERO/PYELOSCOPY, DX Right 2/15/2017    Procedure: URETEROSCOPY, STENT PLACEMENT ;  Surgeon: Chano Lees MD;  Location: [facility];  Service: Urology    VT CARLOS Rogers CM Right 2/15/2017    Procedure: ACCESS INTERPOLAR CALYX, INSERTED TWO WIRES INTO BLADDER, NEPHROLITHOTOMY  PERCUTANEOUS ;  Surgeon: Chano Lees MD;  Location: [facility];  Service: Urology      Family History:     Family History   Problem Relation Age of Onset    Cancer Mother     Colon cancer Mother         CARCINOMA     Melanoma Mother     Alzheimer's disease Father     Urinary tract infection Sister     Cancer Brother         BLADDER    Stroke Maternal Grandfather         CVA      Social History:     Social History     Tobacco Use   Smoking Status Former Smoker    Packs/day: 1 00    Years: 20 00    Pack years: 20 00   Smokeless Tobacco Former User    Quit date: 2007   Tobacco Comment    smokes "very rarely"     Social History     Substance and Sexual Activity   Alcohol Use Yes    Comment: social <2 GLASSES OF WINE PER WEEK      Social History     Substance and Sexual Activity   Drug Use No      Medications and Allergies:     Current Outpatient Medications   Medication Sig Dispense Refill    Ascorbic Acid (VITAMIN C) 100 MG tablet Take 1 tablet by mouth daily      B Complex Vitamins (VITAMIN B COMPLEX) TABS Take 1 tablet by mouth daily      cholecalciferol (VITAMIN D3) 1,000 units tablet Take 1 tablet by mouth daily      Coenzyme Q10 (CO Q 10) 10 MG CAPS Take 1 capsule by mouth daily      Multiple Vitamins-Minerals (EYE VITAMINS & MINERALS PO) Take by mouth      Multiple Vitamins-Minerals (MULTIVITAMIN ADULT PO) Take 1 tablet by mouth daily      olmesartan (BENICAR) 40 mg tablet TAKE 1 TABLET BY MOUTH EVERY DAY 90 tablet 1    Omega-3 Fatty Acids (FISH OIL) 645 MG CAPS Take 1 capsule by mouth daily      Red Yeast Rice 600 MG CAPS Take by mouth      naproxen (NAPROSYN) 500 mg tablet Take 0 5 tablets (250 mg total) by mouth 2 (two) times a day with meals (Patient not taking: Reported on 1/8/2019 ) 60 tablet 0    triamcinolone (KENALOG) 0 5 % cream Apply topically 2 (two) times a day (Patient not taking: Reported on 8/15/2019) 15 g 2     No current facility-administered medications for this visit        Allergies   Allergen Reactions    Other      "Turkish food"    Seasonal Ic  [Cholestatin]       Immunizations:     Immunization History   Administered Date(s) Administered    INFLUENZA 11/07/2017, 11/07/2017, 09/13/2018    Influenza Split High Dose Preservative Free IM 11/29/2016    Influenza, high dose seasonal 0 5 mL 09/13/2018    Pneumococcal Conjugate 13-Valent 08/22/2017    Pneumococcal Polysaccharide PPV23 09/22/2014    Tdap 10/25/2017      Medicare Screening Tests and Risk Assessments:     Jovan Rodriguez is here for her Initial Wellness visit  Health Risk Assessment:  Patient rates overall health as excellent  Patient feels that their physical health rating is Same  Eyesight was rated as Same  Hearing was rated as Much worse  Patient feels that their emotional and mental health rating is Same  Pain experienced by patient in the last 7 days has been None  Patient states that she has experienced no weight loss or gain in last 6 months  Emotional/Mental Health:  Patient has not been feeling nervous/anxious  PHQ-9 Depression Screening:    Frequency of the following problems over the past two weeks:      1  Little interest or pleasure in doing things: 0 - not at all      2  Feeling down, depressed, or hopeless: 0 - not at all  PHQ-2 Score: 0          Broken Bones/Falls: Fall Risk Assessment:    In the past year, patient has experienced: No history of falling in past year          Bladder/Bowel:  Patient has not leaked urine accidently in the last six months  Patient reports no loss of bowel control  Immunizations:  Patient has had a flu vaccination within the last year  Patient has received a pneumonia shot  Patient has received a shingles shot  Patient has received tetanus/diphtheria shot  Home Safety:  Patient does not have trouble with stairs inside or outside of their home  Patient currently reports that there are no safety hazards present in home, working smoke alarms, working carbon monoxide detectors        Preventative Screenings:   Breast cancer screening performed, colon cancer screen completed, cholesterol screen completed, glaucoma eye exam completed,     Nutrition:  Current diet: Regular with servings of the following:    Medications:  Patient is not currently taking any over-the-counter supplements  Patient is able to manage medications  Lifestyle Choices:  Patient reports no tobacco use  Patient has smoked or used tobacco in the past   Patient has stopped her tobacco use  Patient reports alcohol use  Patient drives a vehicle  Patient wears seat belt  Current level of exercise of physical activity described by patient as: moderate  Activities of Daily Living:  Can get out of bed by his or her self, able to dress self, able to make own meals, able to do own shopping, able to bathe self, can do own laundry/housekeeping, can manage own money, pay bills and track expenses    Previous Hospitalizations:  No hospitalization or ED visit in past 12 months        Advanced Directives:  Patient has decided on a power of   Patient has spoken to designated power of   Patient has completed advanced directive          Preventative Screening/Counseling:      Cardiovascular:      General: Screening Current and Risks and Benefits Discussed          Diabetes:      General: Risks and Benefits Discussed and Screening Current          Colorectal Cancer:      General: Risks and Benefits Discussed and Screening Current          Breast Cancer:      General: Risks and Benefits Discussed and Screening Current          Cervical Cancer:      General: Risks and Benefits Discussed and Screening Current          Osteoporosis:      General: Risks and Benefits Discussed          AAA:      General: Risks and Benefits Discussed          Glaucoma:      General: Risks and Benefits Discussed and Screening Current          HIV:      General: Risks and Benefits Discussed          Hepatitis C:      General: Risks and Benefits Discussed        Advanced Directives:   Patient has no living will for healthcare, has durable POA for healthcare, patient does not have an advanced directive  Information on ACP and/or AD provided  5 wishes given  End of life assessment reviewed with patient  Provider agrees with end of life decisions        Immunizations:  Patient reviewed and up to date      Influenza: Risks & Benefits Discussed and Influenza UTD This Year      Pneumococcal: Risks & Benefits Discussed      Shingrix: Risks & Benefits Discussed      TD: Risks & Benefits Discussed and Td Vaccine UTD          No exam data present     Physical Exam:     /82 (BP Location: Left arm, Patient Position: Sitting, Cuff Size: Standard)   Pulse 85   Resp 16   Ht 5' 3" (1 6 m)   Wt 72 6 kg (160 lb)   SpO2 99%   BMI 28 34 kg/m²     Physical Exam    As per office visit note same day

## 2019-08-15 NOTE — PATIENT INSTRUCTIONS
For ongoing serous otitis will give trial of Medrol pack  This may also help patient's left deltoid tendinitis  Labs look good  No change in medications  Follow-up 6 months with repeat labs at that time  Obesity   AMBULATORY CARE:   Obesity  is when your body mass index (BMI) is greater than 30  Your healthcare provider will use your height and weight to measure your BMI  The risks of obesity include  many health problems, such as injuries or physical disability  You may need tests to check for the following:  · Diabetes     · High blood pressure or high cholesterol     · Heart disease     · Gallbladder or liver disease     · Cancer of the colon, breast, prostate, liver, or kidney     · Sleep apnea     · Arthritis or gout  Seek care immediately if:   · You have a severe headache, confusion, or difficulty speaking  · You have weakness on one side of your body  · You have chest pain, sweating, or shortness of breath  Contact your healthcare provider if:   · You have symptoms of gallbladder or liver disease, such as pain in your upper abdomen  · You have knee or hip pain and discomfort while walking  · You have symptoms of diabetes, such as intense hunger and thirst, and frequent urination  · You have symptoms of sleep apnea, such as snoring or daytime sleepiness  · You have questions or concerns about your condition or care  Treatment for obesity  focuses on helping you lose weight to improve your health  Even a small decrease in BMI can reduce the risk for many health problems  Your healthcare provider will help you set a weight-loss goal   · Lifestyle changes  are the first step in treating obesity  These include making healthy food choices and getting regular physical activity  Your healthcare provider may suggest a weight-loss program that involves coaching, education, and therapy       · Medicine  may help you lose weight when it is used with a healthy diet and physical activity  · Surgery  can help you lose weight if you are very obese and have other health problems  There are several types of weight-loss surgery  Ask your healthcare provider for more information  Be successful losing weight:   · Set small, realistic goals  An example of a small goal is to walk for 20 minutes 5 days a week  Anther goal is to lose 5% of your body weight  · Tell friends, family members, and coworkers about your goals  and ask for their support  Ask a friend to lose weight with you, or join a weight-loss support group  · Identify foods or triggers that may cause you to overeat , and find ways to avoid them  Remove tempting high-calorie foods from your home and workplace  Place a bowl of fresh fruit on your kitchen counter  If stress causes you to eat, then find other ways to cope with stress  · Keep a diary to track what you eat and drink  Also write down how many minutes of physical activity you do each day  Weigh yourself once a week and record it in your diary  Eating changes: You will need to eat 500 to 1,000 fewer calories each day than you currently eat to lose 1 to 2 pounds a week  The following changes will help you cut calories:  · Eat smaller portions  Use small plates, no larger than 9 inches in diameter  Fill your plate half full of fruits and vegetables  Measure your food using measuring cups until you know what a serving size looks like  · Eat 3 meals and 1 or 2 snacks each day  Plan your meals in advance  [facility] and eat at home most of the time  Eat slowly  · Eat fruits and vegetables at every meal   They are low in calories and high in fiber, which makes you feel full  Do not add butter, margarine, or cream sauce to vegetables  Use herbs to season steamed vegetables  · Eat less fat and fewer fried foods  Eat more baked or grilled chicken and fish  These protein sources are lower in calories and fat than red meat  Limit fast food   Dress your salads with olive oil and vinegar instead of bottled dressing  · Limit the amount of sugar you eat  Do not drink sugary beverages  Limit alcohol  Activity changes:  Physical activity is good for your body in many ways  It helps you burn calories and build strong muscles  It decreases stress and depression, and improves your mood  It can also help you sleep better  Talk to your healthcare provider before you begin an exercise program   · Exercise for at least 30 minutes 5 days a week  Start slowly  Set aside time each day for physical activity that you enjoy and that is convenient for you  It is best to do both weight training and an activity that increases your heart rate, such as walking, bicycling, or swimming  · Find ways to be more active  Do yard work and housecleaning  Walk up the stairs instead of using elevators  Spend your leisure time going to events that require walking, such as outdoor festivals or fairs  This extra physical activity can help you lose weight and keep it off  Follow up with your healthcare provider as directed: You may need to meet with a dietitian  Write down your questions so you remember to ask them during your visits  © 2017 [address] [facility] Information is for End User's use only and may not be sold, redistributed or otherwise used for commercial purposes  All illustrations and images included in CareNotes® are the copyrighted property of A D A M , Inc  or Luis Alfredo Shelton  The above information is an  only  It is not intended as medical advice for individual conditions or treatments  Talk to your doctor, nurse or pharmacist before following any medical regimen to see if it is safe and effective for you  Urinary Incontinence   WHAT YOU NEED TO KNOW:   What is urinary incontinence? Urinary incontinence (UI) is when you lose control of your bladder  What causes UI? UI occurs because your bladder cannot store or empty urine properly   The following are the most common types of UI:  · Stress incontinence  is when you leak urine due to increased bladder pressure  This may happen when you cough, sneeze, or exercise  · Urge incontinence  is when you feel the need to urinate right away and leak urine accidentally  · Mixed incontinence  is when you have both stress and urge UI  What are the signs and symptoms of UI?   · You feel like your bladder does not empty completely when you urinate  · You urinate often and need to urinate immediately  · You leak urine when you sleep, or you wake up with the urge to urinate  · You leak urine when you cough, sneeze, exercise, or laugh  How is UI diagnosed? Your healthcare provider will ask how often you leak urine and whether you have stress or urge symptoms  Tell him which medicines you take, how often you urinate, and how much liquid you drink each day  You may need any of the following tests:  · Urine tests  may show infection or kidney function  · A pelvic exam  may be done to check for blockages  A pelvic exam will also show if your bladder, uterus, or other organs have moved out of place  · An x-ray, ultrasound, or CT  may show problems with parts of your urinary system  You may be given contrast liquid to help your organs show up better in the pictures  Tell the healthcare provider if you have ever had an allergic reaction to contrast liquid  Do not enter the MRI room with anything metal  Metal can cause serious injury  Tell the healthcare provider if you have any metal in or on your body  · A bladder scan  will show how much urine is left in your bladder after you urinate  You will be asked to urinate and then healthcare providers will use a small ultrasound machine to check the urine left in your bladder  · Cystometry  is used to check the function of your urinary system  Your healthcare provider checks the pressure in your bladder while filling it with fluid   Your bladder pressure may also be tested when your bladder is full and while you urinate  How is UI treated? · Medicines  can help strengthen your bladder control  · Electrical stimulation  is used to send a small amount of electrical energy to your pelvic floor muscles  This helps control your bladder function  Electrodes may be placed outside your body or in your rectum  For women, the electrodes may be placed in the vagina  · A bulking agent  may be injected into the wall of your urethra to make it thicker  This helps keep your urethra closed and decreases urine leakage  · Devices  such as a clamp, pessary, or tampon may help stop urine leaks  Ask your healthcare provider for more information about these and other devices  · Surgery  may be needed if other treatments do not work  Several types of surgery can help improve your bladder control  Ask your healthcare provider for more information about the surgery you may need  How can I manage my symptoms? · Do pelvic muscle exercises often  Your pelvic muscles help you stop urinating  Squeeze these muscles tight for 5 seconds, then relax for 5 seconds  Gradually work up to squeezing for 10 seconds  Do 3 sets of 15 repetitions a day, or as directed  This will help strengthen your pelvic muscles and improve bladder control  · A catheter  may be used to help empty your bladder  A catheter is a tiny, plastic tube that is put into your bladder to drain your urine  Your healthcare provider may tell you to use a catheter to prevent your bladder from getting too full and leaking urine  · Keep a UI record  Write down how often you leak urine and how much you leak  Make a note of what you were doing when you leaked urine  · Train your bladder  Go to the bathroom at set times, such as every 2 hours, even if you do not feel the urge to go  You can also try to hold your urine when you feel the urge to go   For example, hold your urine for 5 minutes when you feel the urge to go  As that becomes easier, hold your urine for 10 minutes  · Drink liquids as directed  Ask your healthcare provider how much liquid to drink each day and which liquids are best for you  You may need to limit the amount of liquid you drink to help control your urine leakage  Limit or do not have drinks that contain caffeine or alcohol  Do not drink any liquid right before you go to bed  · Prevent constipation  Eat a variety of high-fiber foods  Good examples are high-fiber cereals, beans, vegetables, and whole-grain breads  Prune juice may help make your bowel movement softer  Walking is the best way to trigger your intestines to have a bowel movement  · Exercise regularly and maintain a healthy weight  Ask your healthcare provider how much you should weigh and about the best exercise plan for you  Weight loss and exercise will decrease pressure on your bladder and help you control your leakage  Ask him to help you create a weight loss plan if you are overweight  When should I seek immediate care? · You have severe pain  · You are confused or cannot think clearly  When should I contact my healthcare provider? · You have a fever  · You see blood in your urine  · You have pain when you urinate  · You have new or worse pain, even after treatment  · Your mouth feels dry or you have vision changes  · Your urine is cloudy or smells bad  · You have questions or concerns about your condition or care  CARE AGREEMENT:   You have the right to help plan your care  Learn about your health condition and how it may be treated  Discuss treatment options with your caregivers to decide what care you want to receive  You always have the right to refuse treatment  The above information is an  only  It is not intended as medical advice for individual conditions or treatments   Talk to your doctor, nurse or pharmacist before following any medical regimen to see if it is safe and effective for you  © 2017 [facility] Information is for End User's use only and may not be sold, redistributed or otherwise used for commercial purposes  All illustrations and images included in CareNotes® are the copyrighted property of A D A M , Inc  or Luis Alfredo Shelton  Cigarette Smoking and Your Health   AMBULATORY CARE:   Risks to your health if you smoke:  Nicotine and other chemicals found in tobacco damage every cell in your body  Even if you are a light smoker, you have an increased risk for cancer, heart disease, and lung disease  If you are pregnant or have diabetes, smoking increases your risk for complications  Benefits to your health if you stop smoking:   · You decrease respiratory symptoms such as coughing, wheezing, and shortness of breath  · You reduce your risk for cancers of the lung, mouth, throat, kidney, bladder, pancreas, stomach, and cervix  If you already have cancer, you increase the benefits of chemotherapy  You also reduce your risk for cancer returning or a second cancer from developing  · You reduce your risk for heart disease, blood clots, heart attack, and stroke  · You reduce your risk for lung infections, and diseases such as pneumonia, asthma, chronic bronchitis, and emphysema  · Your circulation improves  More oxygen can be delivered to your body  If you have diabetes, you lower your risk for complications, such as kidney, artery, and eye diseases  You also lower your risk for nerve damage  Nerve damage can lead to amputations, poor vision, and blindness  · You improve your body's ability to heal and to fight infections  Benefits to the health of others if you stop smoking:  Tobacco is harmful to nonsmokers who breathe in your secondhand smoke  The following are ways the health of others around you may improve when you stop smoking:  · You lower the risks for lung cancer and heart disease in nonsmoking adults  · If you are pregnant, you lower the risk for miscarriage, early delivery, low birth weight, and stillbirth  You also lower your baby's risk for SIDS, obesity, developmental delay, and neurobehavioral problems, such as ADHD  · If you have children, you lower their risk for ear infections, colds, pneumonia, bronchitis, and asthma  For more information and support to stop smoking:   · [employer]  Phone: 6- 483 - [phone]  Web Address: www [employer]  Follow up with your healthcare provider as directed:  Write down your questions so you remember to ask them during your visits  © 2017 [address] Information is for End User's use only and may not be sold, redistributed or otherwise used for commercial purposes  All illustrations and images included in CareNotes® are the copyrighted property of A D A M , Inc  or Luis Alfredo Shelton  The above information is an  only  It is not intended as medical advice for individual conditions or treatments  Talk to your doctor, nurse or pharmacist before following any medical regimen to see if it is safe and effective for you  Fall Prevention   AMBULATORY CARE:   Fall prevention  includes ways to make your home and other areas safer  It also includes ways you can move more carefully to prevent a fall  Health conditions that cause changes in your blood pressure, vision, or muscle strength and coordination may increase your risk for falls  Medicines may also increase your risk for falls if they make you dizzy, weak, or sleepy  Call 911 or have someone else call if:   · You have fallen and are unconscious  · You have fallen and cannot move part of your body  Contact your healthcare provider if:   · You have fallen and have pain or a headache  · You have questions or concerns about your condition or care  Fall prevention tips:   · Stand or sit up slowly  This may help you keep your balance and prevent falls      · Use assistive devices as directed  Your healthcare provider may suggest that you use a cane or walker to help you keep your balance  You may need to have grab bars put in your bathroom near the toilet or in the shower  · Wear shoes that fit well and have soles that   Wear shoes both inside and outside  Use slippers with good   Do not wear shoes with high heels  · Wear a personal alarm  This is a device that allows you to call 911 if you fall and need help  Ask your healthcare provider for more information  · Stay active  Exercise can help strengthen your muscles and improve your balance  Your healthcare provider may recommend water aerobics or walking  He or she may also recommend physical therapy to improve your coordination  Never start an exercise program without talking to your healthcare provider first      · Manage your medical conditions  Keep all appointments with your healthcare providers  Visit your eye doctor as directed  Home safety tips:   · Add items to prevent falls in the bathroom  Put nonslip strips on your bath or shower floor to prevent you from slipping  Use a bath mat if you do not have carpet in the bathroom  This will prevent you from falling when you step out of the bath or shower  Use a shower seat so you do not need to stand while you shower  Sit on the toilet or a chair in your bathroom to dry yourself and put on clothing  This will prevent you from losing your balance from drying or dressing yourself while you are standing  · Keep paths clear  Remove books, shoes, and other objects from walkways and stairs  Place cords for telephones and lamps out of the way so that you do not need to walk over them  Tape them down if you cannot move them  Remove small rugs  If you cannot remove a rug, secure it with double-sided tape  This will prevent you from tripping  · Install bright lights in your home    Use night lights to help light paths to the bathroom or kitchen  Always turn on the light before you start walking  · Keep items you use often on shelves within reach  Do not use a step stool to help you reach an item  · Paint or place reflective tape on the edges of your stairs  This will help you see the stairs better  Follow up with your healthcare provider as directed:  Write down your questions so you remember to ask them during your visits  © 2017 [address] [employer] Information is for End User's use only and may not be sold, redistributed or otherwise used for commercial purposes  All illustrations and images included in CareNotes® are the copyrighted property of A D A M , Inc  or Luis Alfredo Shelton  The above information is an  only  It is not intended as medical advice for individual conditions or treatments  Talk to your doctor, nurse or pharmacist before following any medical regimen to see if it is safe and effective for you  Advance Directives   WHAT YOU NEED TO KNOW:   What are advance directives? Advance directives are legal documents that state your wishes and plans for medical care  These plans are made ahead of time in case you lose your ability to make decisions for yourself  Advance directives can apply to any medical decision, such as the treatments you want, and if you want to donate organs  What are the types of advance directives? There are many types of advance directives, and each state has rules about how to use them  You may choose a combination of any of the following:  · Living will: This is a written record of the treatment you want  You can also choose which treatments you do not want, which to limit, and which to stop at a certain time  This includes surgery, medicine, IV fluid, and tube feedings  · Durable power of  for healthcare [facility]): This is a written record that states who you want to make healthcare choices for you when you are unable to make them for yourself   This person, called a proxy, is usually a family member or a friend  You may choose more than 1 proxy  · Do not resuscitate (DNR) order:  A DNR order is used in case your heart stops beating or you stop breathing  It is a request not to have certain forms of treatment, such as CPR  A DNR order may be included in other types of advance directives  · Medical directive: This covers the care that you want if you are in a coma, near death, or unable to make decisions for yourself  You can list the treatments you want for each condition  Treatment may include pain medicine, surgery, blood transfusions, dialysis, IV or tube feedings, and a ventilator (breathing machine)  · Values history: This document has questions about your views, beliefs, and how you feel and think about life  This information can help others choose the care that you would choose  Why are advance directives important? An advance directive helps you control your care  Although spoken wishes may be used, it is better to have your wishes written down  Spoken wishes can be misunderstood, or not followed  Treatments may be given even if you do not want them  An advance directive may make it easier for your family to make difficult choices about your care  How do I decide what to put in my advance directives? · Make informed decisions:  Make sure you fully understand treatments or care you may receive  Think about the benefits and problems your decisions could cause for you or your family  Talk to healthcare providers if you have concerns or questions before you write down your wishes  You may also want to talk with your Mormon or , or a   Check your state laws to make sure that what you put in your advance directive is legal      · Sign all forms:  Sign and date your advance directive when you have finished  You may also need 2 witnesses to sign the forms   Witnesses cannot be your doctor or his staff, your spouse, heirs or beneficiaries, people you owe money to, or your chosen proxy  Talk to your family, proxy, and healthcare providers about your advance directive  Give each person a copy, and keep one for yourself in a place you can get to easily  Do not keep it hidden or locked away  · Review and revise your plans: You can revise your advance directive at any time, as long as you are able to make decisions  Review your plan every year, and when there are changes in your life, or your health  When you make changes, let your family, proxy, and healthcare providers know  Give each a new copy  Where can I find more information? · [employer]  [address] , [address]  Phone: [phone]  Phone: [phone]  Web Address: http://www  aa org  · [address] [facility])  [address] [address] [facility], [address]  Phone: [phone]  Phone: [phone]  Web Address: Justin broussard  CARE AGREEMENT:   You have the right to help plan your care  To help with this plan, you must learn about your health condition and treatment options  You must also learn about advance directives and how they are used  Work with your healthcare providers to decide what care will be used to treat you  You always have the right to refuse treatment  The above information is an  only  It is not intended as medical advice for individual conditions or treatments  Talk to your doctor, nurse or pharmacist before following any medical regimen to see if it is safe and effective for you  © 2017 [employer]  Information is for End User's use only and may not be sold, redistributed or otherwise used for commercial purposes  All illustrations and images included in CareNotes® are the copyrighted property of A [employer]  or Luis Alfredo Shelton

## 2019-08-19 ENCOUNTER — TELEPHONE (OUTPATIENT)
Dept: OTHER | Facility: OTHER | Age: 74
End: 2019-08-19

## 2019-08-19 DIAGNOSIS — H65.02 NON-RECURRENT ACUTE SEROUS OTITIS MEDIA OF LEFT EAR: Primary | ICD-10-CM

## 2019-08-19 DIAGNOSIS — H91.93 BILATERAL HEARING LOSS, UNSPECIFIED HEARING LOSS TYPE: ICD-10-CM

## 2019-08-19 RX ORDER — PREDNISONE 10 MG/1
TABLET ORAL
Qty: 30 TABLET | Refills: 0 | Status: SHIPPED | OUTPATIENT
Start: 2019-08-19 | End: 2020-02-18 | Stop reason: ALTCHOICE

## 2019-08-19 NOTE — TELEPHONE ENCOUNTER
Will put her on a slower taper dose of prednisone  Also would advise her to obtain over-the-counter either Flonase/Rhinocort/or Nasacort, and use 1 spray in each nostril twice daily, remembering it will take about 5 days to be effective

## 2019-08-19 NOTE — TELEPHONE ENCOUNTER
Mamie Schaumann says that you put her on prednisone for the hearing loss 6 tabs on day 1; 5 day 2; then taper down to 4,3,2,1  She says that she only saw results on day 2 at the heavier dose of 6 and 5 tabs and now her hearing is going back to where it was before  So he question is if you can put her on a heavier dose of the prednisone

## 2019-08-22 ENCOUNTER — TELEPHONE (OUTPATIENT)
Dept: INTERNAL MEDICINE CLINIC | Facility: CLINIC | Age: 74
End: 2019-08-22

## 2019-08-22 DIAGNOSIS — H65.02 NON-RECURRENT ACUTE SEROUS OTITIS MEDIA OF LEFT EAR: Primary | ICD-10-CM

## 2019-08-22 NOTE — TELEPHONE ENCOUNTER
Patient is still having problems with her ears and would like to see an ENT  She would like a referral for one      Call back #[phone]  Manoj Espino

## 2019-09-19 ENCOUNTER — APPOINTMENT (OUTPATIENT)
Dept: LAB | Facility: HOSPITAL | Age: 74
End: 2019-09-19
Payer: MEDICARE

## 2019-09-19 ENCOUNTER — TRANSCRIBE ORDERS (OUTPATIENT)
Dept: ADMINISTRATIVE | Facility: HOSPITAL | Age: 74
End: 2019-09-19

## 2019-09-19 DIAGNOSIS — N20.0 KIDNEY STONE: ICD-10-CM

## 2019-09-19 DIAGNOSIS — N20.0 KIDNEY STONE: Primary | ICD-10-CM

## 2019-09-19 LAB
ALBUMIN SERPL BCP-MCNC: 3.4 G/DL (ref 3.5–5)
ANION GAP SERPL CALCULATED.3IONS-SCNC: 8 MMOL/L (ref 4–13)
APTT PPP: 26 SECONDS (ref 23–37)
BASOPHILS # BLD AUTO: 0.04 THOUSANDS/ΜL (ref 0–0.1)
BASOPHILS NFR BLD AUTO: 1 % (ref 0–1)
BUN SERPL-MCNC: 15 MG/DL (ref 5–25)
CALCIUM ALBUM COR SERPL-MCNC: 10.9 MG/DL (ref 8.3–10.1)
CALCIUM SERPL-MCNC: 10.4 MG/DL (ref 8.3–10.1)
CALCIUM SERPL-MCNC: 10.4 MG/DL (ref 8.3–10.1)
CHLORIDE SERPL-SCNC: 103 MMOL/L (ref 100–108)
CO2 SERPL-SCNC: 28 MMOL/L (ref 21–32)
CREAT SERPL-MCNC: 1 MG/DL (ref 0.6–1.3)
EOSINOPHIL # BLD AUTO: 0.13 THOUSAND/ΜL (ref 0–0.61)
EOSINOPHIL NFR BLD AUTO: 2 % (ref 0–6)
ERYTHROCYTE [DISTWIDTH] IN BLOOD BY AUTOMATED COUNT: 14.3 % (ref 11.6–15.1)
GFR SERPL CREATININE-BSD FRML MDRD: 56 ML/MIN/1.73SQ M
GLUCOSE SERPL-MCNC: 102 MG/DL (ref 65–140)
HCT VFR BLD AUTO: 40.6 % (ref 34.8–46.1)
HGB BLD-MCNC: 13.2 G/DL (ref 11.5–15.4)
IMM GRANULOCYTES # BLD AUTO: 0.01 THOUSAND/UL (ref 0–0.2)
IMM GRANULOCYTES NFR BLD AUTO: 0 % (ref 0–2)
INR PPP: 0.97 (ref 0.84–1.19)
LYMPHOCYTES # BLD AUTO: 1.86 THOUSANDS/ΜL (ref 0.6–4.47)
LYMPHOCYTES NFR BLD AUTO: 31 % (ref 14–44)
MCH RBC QN AUTO: 30.5 PG (ref 26.8–34.3)
MCHC RBC AUTO-ENTMCNC: 32.5 G/DL (ref 31.4–37.4)
MCV RBC AUTO: 94 FL (ref 82–98)
MONOCYTES # BLD AUTO: 0.36 THOUSAND/ΜL (ref 0.17–1.22)
MONOCYTES NFR BLD AUTO: 6 % (ref 4–12)
NEUTROPHILS # BLD AUTO: 3.61 THOUSANDS/ΜL (ref 1.85–7.62)
NEUTS SEG NFR BLD AUTO: 60 % (ref 43–75)
NRBC BLD AUTO-RTO: 0 /100 WBCS
PLATELET # BLD AUTO: 370 THOUSANDS/UL (ref 149–390)
PMV BLD AUTO: 9.8 FL (ref 8.9–12.7)
POTASSIUM SERPL-SCNC: 4.5 MMOL/L (ref 3.5–5.3)
PROTHROMBIN TIME: 12.9 SECONDS (ref 11.6–14.5)
RBC # BLD AUTO: 4.33 MILLION/UL (ref 3.81–5.12)
SODIUM SERPL-SCNC: 139 MMOL/L (ref 136–145)
WBC # BLD AUTO: 6.01 THOUSAND/UL (ref 4.31–10.16)

## 2019-09-19 PROCEDURE — 85730 THROMBOPLASTIN TIME PARTIAL: CPT

## 2019-09-19 PROCEDURE — 85610 PROTHROMBIN TIME: CPT

## 2019-09-19 PROCEDURE — 36415 COLL VENOUS BLD VENIPUNCTURE: CPT

## 2019-09-19 PROCEDURE — 82040 ASSAY OF SERUM ALBUMIN: CPT

## 2019-09-19 PROCEDURE — 80048 BASIC METABOLIC PNL TOTAL CA: CPT

## 2019-09-19 PROCEDURE — 85025 COMPLETE CBC W/AUTO DIFF WBC: CPT

## 2019-10-22 ENCOUNTER — APPOINTMENT (OUTPATIENT)
Dept: LAB | Facility: HOSPITAL | Age: 74
End: 2019-10-22
Payer: MEDICARE

## 2019-10-22 ENCOUNTER — TRANSCRIBE ORDERS (OUTPATIENT)
Dept: ADMINISTRATIVE | Facility: HOSPITAL | Age: 74
End: 2019-10-22

## 2019-10-22 DIAGNOSIS — E83.52 HIGH CALCIUM LEVELS: Primary | ICD-10-CM

## 2019-10-22 DIAGNOSIS — E83.52 HIGH CALCIUM LEVELS: ICD-10-CM

## 2019-10-22 DIAGNOSIS — N20.0 KIDNEY STONE: ICD-10-CM

## 2019-10-22 LAB — PTH-INTACT SERPL-MCNC: 36.4 PG/ML (ref 18.4–80.1)

## 2019-10-22 PROCEDURE — 36415 COLL VENOUS BLD VENIPUNCTURE: CPT

## 2019-10-22 PROCEDURE — 83970 ASSAY OF PARATHORMONE: CPT

## 2019-10-24 ENCOUNTER — HOSPITAL ENCOUNTER (OUTPATIENT)
Dept: RADIOLOGY | Facility: HOSPITAL | Age: 74
Discharge: HOME | End: 2019-10-24
Payer: MEDICARE

## 2019-10-24 DIAGNOSIS — N20.0 KIDNEY STONE: ICD-10-CM

## 2019-10-24 PROCEDURE — 74018 RADEX ABDOMEN 1 VIEW: CPT

## 2019-10-31 ENCOUNTER — TELEPHONE (OUTPATIENT)
Dept: INTERNAL MEDICINE CLINIC | Facility: CLINIC | Age: 74
End: 2019-10-31

## 2019-10-31 DIAGNOSIS — Z12.11 SCREENING FOR COLON CANCER: Primary | ICD-10-CM

## 2019-11-06 ENCOUNTER — TRANSCRIBE ORDERS (OUTPATIENT)
Dept: ADMINISTRATIVE | Facility: HOSPITAL | Age: 74
End: 2019-11-06

## 2019-11-06 DIAGNOSIS — M75.112 INCOMPLETE TEAR OF LEFT ROTATOR CUFF, UNSPECIFIED WHETHER TRAUMATIC: Primary | ICD-10-CM

## 2019-11-15 ENCOUNTER — HOSPITAL ENCOUNTER (OUTPATIENT)
Dept: MRI IMAGING | Facility: HOSPITAL | Age: 74
Discharge: HOME | End: 2019-11-15
Payer: MEDICARE

## 2019-11-15 DIAGNOSIS — M75.112 INCOMPLETE TEAR OF LEFT ROTATOR CUFF, UNSPECIFIED WHETHER TRAUMATIC: ICD-10-CM

## 2019-11-15 PROCEDURE — 73221 MRI JOINT UPR EXTREM W/O DYE: CPT

## 2020-01-05 DIAGNOSIS — I10 BENIGN HYPERTENSION: ICD-10-CM

## 2020-01-06 RX ORDER — OLMESARTAN MEDOXOMIL 40 MG/1
TABLET ORAL
Qty: 90 TABLET | Refills: 1 | Status: SHIPPED | OUTPATIENT
Start: 2020-01-06 | End: 2020-06-26

## 2020-01-29 ENCOUNTER — CLINICAL SUPPORT (OUTPATIENT)
Dept: INTERNAL MEDICINE CLINIC | Facility: CLINIC | Age: 75
End: 2020-01-29
Payer: MEDICARE

## 2020-01-29 DIAGNOSIS — Z23 NEED FOR VACCINATION: Primary | ICD-10-CM

## 2020-01-29 PROCEDURE — G0009 ADMIN PNEUMOCOCCAL VACCINE: HCPCS

## 2020-01-29 PROCEDURE — 90732 PPSV23 VACC 2 YRS+ SUBQ/IM: CPT

## 2020-01-30 ENCOUNTER — TELEPHONE (OUTPATIENT)
Dept: GASTROENTEROLOGY | Facility: CLINIC | Age: 75
End: 2020-01-30

## 2020-01-30 NOTE — TELEPHONE ENCOUNTER
01/30/20  Screened by: Rupert ReachForce    Referring Provider dr Styles Big: If patient answers NO to medical questions, then schedule procedure  If patient answers YES to medical questions, then schedule office appointment  Previous Colonoscopy yes  Date and Facility of last colonoscopy? 5 years ago     Comments: family hx colon cancer        Pre- Screening: There is no height or weight on file to calculate BMI  Has patient been referred for a routine screening Colonoscopy? yes  Is the patient between 39-70 years old? yes    SCHEDULING STAFF   If the patient is between 45yrs-49yrs, please advise patient to confirm benefits/coverage with their insurance company for a routine screening colonoscopy, some insurance carriers will only cover at [phone] or older   If the patient is over 76years old, please schedule an office visit   If the patient had a previous colonoscopy send to the procedure  before continuing    Have you been diagnosed with a bleeding disorder or anemia? no    Do you take no    Have you been diagnosed with Diabetes or are you taking any   Diabetic medications? no    Do you have any of the following symptoms?  no    Have you had a coronary or vascular stent within the last year? no    Have you had a heart attack or stroke in the last 6 months? no    Have you had intestinal surgery in the last 3 months? no    Do you have problems with: no    Do you use: no    Have you been hospitalized in the last Month? no    Have you had chest pain (angina) or breathing problems  (COPD) in the last 3 months? no    Do you have any difficulty walking up a flight of stairs? no    Have you had Kidney failure or insufficiency? no    Have you had heart valve surgery? no    Are you confined to a wheelchair?  no

## 2020-02-18 ENCOUNTER — OFFICE VISIT (OUTPATIENT)
Dept: INTERNAL MEDICINE CLINIC | Facility: CLINIC | Age: 75
End: 2020-02-18
Payer: MEDICARE

## 2020-02-18 VITALS
BODY MASS INDEX: 28.7 KG/M2 | HEIGHT: 63 IN | HEART RATE: 89 BPM | SYSTOLIC BLOOD PRESSURE: 122 MMHG | OXYGEN SATURATION: 99 % | WEIGHT: 162 LBS | DIASTOLIC BLOOD PRESSURE: 82 MMHG

## 2020-02-18 DIAGNOSIS — R73.01 IMPAIRED FASTING GLUCOSE: ICD-10-CM

## 2020-02-18 DIAGNOSIS — E78.2 MIXED HYPERLIPIDEMIA: ICD-10-CM

## 2020-02-18 DIAGNOSIS — I10 BENIGN HYPERTENSION: Primary | ICD-10-CM

## 2020-02-18 DIAGNOSIS — N20.0 NEPHROLITHIASIS: ICD-10-CM

## 2020-02-18 PROCEDURE — 99214 OFFICE O/P EST MOD 30 MIN: CPT | Performed by: PHYSICIAN ASSISTANT

## 2020-02-18 PROCEDURE — 1160F RVW MEDS BY RX/DR IN RCRD: CPT | Performed by: PHYSICIAN ASSISTANT

## 2020-02-18 PROCEDURE — 4040F PNEUMOC VAC/ADMIN/RCVD: CPT | Performed by: PHYSICIAN ASSISTANT

## 2020-02-18 PROCEDURE — 1036F TOBACCO NON-USER: CPT | Performed by: PHYSICIAN ASSISTANT

## 2020-02-18 PROCEDURE — 3079F DIAST BP 80-89 MM HG: CPT | Performed by: PHYSICIAN ASSISTANT

## 2020-02-18 PROCEDURE — 3074F SYST BP LT 130 MM HG: CPT | Performed by: PHYSICIAN ASSISTANT

## 2020-02-18 PROCEDURE — 3008F BODY MASS INDEX DOCD: CPT | Performed by: PHYSICIAN ASSISTANT

## 2020-02-18 RX ORDER — LORATADINE 10 MG/1
10 TABLET ORAL DAILY
COMMUNITY
Start: 2019-11-30 | End: 2020-06-10 | Stop reason: ALTCHOICE

## 2020-02-18 RX ORDER — FLUTICASONE PROPIONATE 50 MCG
SPRAY, SUSPENSION (ML) NASAL
COMMUNITY
Start: 2020-01-03 | End: 2020-05-12 | Stop reason: ALTCHOICE

## 2020-02-18 NOTE — PATIENT INSTRUCTIONS
No change in current medications  Have labs done in the next week or so and I will review the results when they are available  Follow-up with specialists as scheduled  Schedule follow-up here in 6 months, sooner as needed

## 2020-02-18 NOTE — PROGRESS NOTES
Assessment/Plan:      Quality Measures: BMI Counseling: Body mass index is 28 7 kg/m²  The BMI is above normal  Nutrition recommendations include decreasing portion sizes, encouraging healthy choices of fruits and vegetables and moderation in carbohydrate intake  Exercise recommendations include exercising 3-5 times per week  Return in about 6 months (around 8/18/2020) for Next scheduled follow up  Diagnoses and all orders for this visit:    Benign hypertension    Mixed hyperlipidemia    Impaired fasting glucose    Nephrolithiasis    Other orders  -     Omega-3 Fatty Acids (FISH OIL PO)  -     fluticasone (FLONASE) 50 mcg/act nasal spray  -     loratadine (CLARITIN) 10 mg tablet; Take 10 mg by mouth daily          Subjective:      Patient ID: Charmaine Gonzalez is a 76 y o  female  Follow-up    Patient did not have her labs done for this visit  She will have them done within the next week or 2 and we will review them when available  Since last visit patient had developed a sudden loss of hearing in her left ear  She went to local urgent care, was initially treated with antibiotics which did not provide any relief  She went to ENT who added courses of steroids with no improvement  Hyperbaric treatment was advised however her insurance would not cover  She has been left with a hearing loss in her left ear that required her to obtain a hearing aid  She does remark the hearing aid works well but is not at all like having natural hearing  Patient has colonoscopy coming up  She has family history of colon cancer  No rectal bleeding  Impaired fasting glucose:  Patient is concerned about her upcoming labs as she admits she consume significant amount of chocolate over the holidays and over her recent Amaral's day  No polyuria, polyphagia, polydipsia  Hypertension:  Well controlled on her current medications    Michelle cp, palp, sob, edema, HA, dizziness, diaphoresis, syncope, visual disturbance  Hyperlipidemia:  On red rice yeast supplementation  Labs to be done  Denies side effects from the supplement  Kidney stones: Followed by Urology  No recent kidney type pain or hematuria  No other focal issues today        ALLERGIES:  Allergies   Allergen Reactions    Other      "Sammarinese food"    Seasonal Ic  [Cholestatin]        CURRENT MEDICATIONS:    Current Outpatient Medications:     Ascorbic Acid (VITAMIN C) 100 MG tablet, Take 1 tablet by mouth daily, Disp: , Rfl:     B Complex Vitamins (VITAMIN B COMPLEX) TABS, Take 1 tablet by mouth daily, Disp: , Rfl:     cholecalciferol (VITAMIN D3) 1,000 units tablet, Take 1 tablet by mouth daily, Disp: , Rfl:     Coenzyme Q10 (CO Q 10) 10 MG CAPS, Take 1 capsule by mouth daily, Disp: , Rfl:     Multiple Vitamins-Minerals (EYE VITAMINS & MINERALS PO), Take by mouth, Disp: , Rfl:     Multiple Vitamins-Minerals (MULTIVITAMIN ADULT PO), Take 1 tablet by mouth daily, Disp: , Rfl:     olmesartan (BENICAR) 40 mg tablet, TAKE 1 TABLET BY MOUTH EVERY DAY, Disp: 90 tablet, Rfl: 1    Omega-3 Fatty Acids (FISH OIL PO), , Disp: , Rfl:     Omega-3 Fatty Acids (FISH OIL) 645 MG CAPS, Take 1 capsule by mouth daily, Disp: , Rfl:     Red Yeast Rice 600 MG CAPS, Take by mouth, Disp: , Rfl:     fluticasone (FLONASE) 50 mcg/act nasal spray, , Disp: , Rfl:     loratadine (CLARITIN) 10 mg tablet, Take 10 mg by mouth daily, Disp: , Rfl:     ACTIVE PROBLEM LIST:  Patient Active Problem List   Diagnosis    Nephrolithiasis    Allergic rhinitis    Benign hypertension    Diverticulosis    Hiatal hernia    Hyperlipidemia    Hyperoxaluria    Depression    Impaired fasting glucose    Diverticulitis    Left shoulder tendonitis    Carpal tunnel syndrome of left wrist       PAST MEDICAL HISTORY:  Past Medical History:   Diagnosis Date    Hypertension     Kidney stones     Pneumonia        PAST SURGICAL HISTORY:  Past Surgical History:   Procedure Laterality Date    DILATION AND CURETTAGE OF UTERUS      [place] CYSTO/URETERO/PYELOSCOPY, DX Right 2/15/2017    Procedure: URETEROSCOPY, STENT PLACEMENT ;  Surgeon: Leroy Hobson MD;  [facility];  Service: Urology    [place] PERCUT [address] KID STONE,2+ CM Right 2/15/2017    Procedure: ACCESS INTERPOLAR CALYX, INSERTED TWO WIRES INTO BLADDER, NEPHROLITHOTOMY  PERCUTANEOUS ;  Surgeon: Leroy Hobson MD;  [facility];  Service: Urology       FAMILY HISTORY:  Family History   Problem Relation Age of Onset    Cancer Mother     Colon cancer Mother         CARCINOMA     Melanoma Mother     Alzheimer's disease Father     Urinary tract infection Sister     Cancer Brother         BLADDER    Stroke Maternal Grandfather         CVA       SOCIAL HISTORY:  Social History     Socioeconomic History    Marital status: /Civil Union     Spouse name: Not on file    Number of children: 5    Years of education: Not on file    Highest education level: Not on file   Occupational History    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 [employer] resource strain: Not on file    Food insecurity:     Worry: Not on file     Inability: Not on file    Transportation needs:     Medical: Not on file     Non-medical: Not on file   Tobacco Use    Smoking status: Former Smoker     Packs/day: 1 00     Years: 20 00     Pack years: 20 00    Smokeless tobacco: Former User     Quit date: 2007    Tobacco comment: smokes "very rarely"   Substance and Sexual Activity    Alcohol use: Yes     Comment: social <2 GLASSES OF WINE PER WEEK     Drug use: No    Sexual activity: Never     Partners: Male   Lifestyle    Physical activity:     Days per week: Not on file     Minutes per session: Not on file    Stress: Not on file   Relationships    Social connections:     Talks on phone: Not on file     Gets together: Not on file     Attends [employer] service: Not on file     Active member of club or organization: Not on file     Attends meetings of clubs or organizations: Not on file     Relationship status: Not on file    Intimate partner violence:     Fear of current or ex partner: Not on file     Emotionally abused: Not on file     Physically abused: Not on file     Forced sexual activity: Not on file   Other Topics Concern    Not on file   Social History Narrative    ACTIVITIES -- COMPUTERS, READING     [address]       Review of Systems   Constitutional: Negative for activity change, chills, fatigue and fever  HENT: Positive for hearing loss (Left ear, now with hearing aid) and sinus pressure  Negative for congestion, postnasal drip, rhinorrhea, sinus pain, sneezing, sore throat, trouble swallowing and voice change  Eyes: Negative for discharge and visual disturbance  Respiratory: Negative for cough, chest tightness and shortness of breath  Cardiovascular: Negative for chest pain, palpitations and leg swelling  Gastrointestinal: Negative for abdominal pain, blood in stool, constipation, diarrhea, nausea and vomiting  Endocrine: Negative for polydipsia, polyphagia and polyuria  Genitourinary: Negative for difficulty urinating  Musculoskeletal: Negative for arthralgias and myalgias  Skin: Negative for rash  Allergic/Immunologic: Negative for immunocompromised state  Neurological: Negative for dizziness, syncope, weakness, light-headedness and headaches  Hematological: Negative for adenopathy  Does not bruise/bleed easily  Psychiatric/Behavioral: Negative for dysphoric mood and sleep disturbance  The patient is not nervous/anxious  Objective:  Vitals:    02/18/20 1011   BP: 122/82   BP Location: Left arm   Patient Position: Sitting   Cuff Size: Adult   Pulse: 89   SpO2: 99%   Weight: 73 5 kg (162 lb)   Height: 5' 3" (1 6 m)     Body mass index is 28 7 kg/m²       Physical Exam Constitutional: She is oriented to person, place, and time  She appears well-developed and well-nourished  No distress  HENT:   Head: Normocephalic  Hearing aid left ear   Eyes: Pupils are equal, round, and reactive to light  Neck: Neck supple  No JVD present  Carotid bruit is not present  No thyromegaly present  Cardiovascular: Normal rate, regular rhythm and normal heart sounds  Pulmonary/Chest: Effort normal and breath sounds normal  No respiratory distress  She exhibits no tenderness  Musculoskeletal: She exhibits no edema  Lymphadenopathy:     She has no cervical adenopathy  Neurological: She is alert and oriented to person, place, and time  Skin: Skin is warm and dry  No rash noted  Psychiatric: She has a normal mood and affect  Her behavior is normal    Nursing note and vitals reviewed  RESULTS:    No results found for this or any previous visit (from the past 1008 hour(s))  This note was created with voice recognition software  Phonic, grammatical and spelling errors may be present within the note as a result

## 2020-02-25 ENCOUNTER — OFFICE VISIT (OUTPATIENT)
Dept: DERMATOLOGY | Facility: CLINIC | Age: 75
End: 2020-02-25
Payer: MEDICARE

## 2020-02-25 DIAGNOSIS — L57.0 LICHENOID KERATOSIS: Primary | ICD-10-CM

## 2020-02-25 PROCEDURE — 4040F PNEUMOC VAC/ADMIN/RCVD: CPT | Performed by: DERMATOLOGY

## 2020-02-25 PROCEDURE — 1036F TOBACCO NON-USER: CPT | Performed by: DERMATOLOGY

## 2020-02-25 PROCEDURE — 3074F SYST BP LT 130 MM HG: CPT | Performed by: DERMATOLOGY

## 2020-02-25 PROCEDURE — 99212 OFFICE O/P EST SF 10 MIN: CPT | Performed by: DERMATOLOGY

## 2020-02-25 PROCEDURE — 3079F DIAST BP 80-89 MM HG: CPT | Performed by: DERMATOLOGY

## 2020-02-25 PROCEDURE — 1160F RVW MEDS BY RX/DR IN RCRD: CPT | Performed by: DERMATOLOGY

## 2020-02-25 NOTE — PATIENT INSTRUCTIONS
Patient reassured this is just inflamed keratosis no treatment needed unless this does not resolve will plan follow-up again in May for overall checkup

## 2020-02-28 ENCOUNTER — TELEPHONE (OUTPATIENT)
Dept: OBGYN CLINIC | Facility: HOSPITAL | Age: 75
End: 2020-02-28

## 2020-02-28 NOTE — TELEPHONE ENCOUNTER
Patient sees Dr Naomi Cabrera  Patient is calling in wanting to know the last time she was seen in our office

## 2020-03-12 ENCOUNTER — LAB (OUTPATIENT)
Dept: LAB | Facility: HOSPITAL | Age: 75
End: 2020-03-12
Payer: MEDICARE

## 2020-03-12 ENCOUNTER — TRANSCRIBE ORDERS (OUTPATIENT)
Dept: ADMINISTRATIVE | Facility: HOSPITAL | Age: 75
End: 2020-03-12

## 2020-03-12 ENCOUNTER — HOSPITAL ENCOUNTER (OUTPATIENT)
Dept: RADIOLOGY | Facility: HOSPITAL | Age: 75
Discharge: HOME | End: 2020-03-12
Payer: MEDICARE

## 2020-03-12 DIAGNOSIS — N20.0 CALCULUS OF KIDNEY: ICD-10-CM

## 2020-03-12 DIAGNOSIS — N20.0 CALCULUS OF KIDNEY: Primary | ICD-10-CM

## 2020-03-12 LAB
ALBUMIN SERPL BCP-MCNC: 3.2 G/DL (ref 3.5–5)
ANION GAP SERPL CALCULATED.3IONS-SCNC: 6 MMOL/L (ref 4–13)
BUN SERPL-MCNC: 19 MG/DL (ref 5–25)
CALCIUM ALBUM COR SERPL-MCNC: 10.8 MG/DL (ref 8.3–10.1)
CALCIUM SERPL-MCNC: 10.2 MG/DL (ref 8.3–10.1)
CALCIUM SERPL-MCNC: 10.2 MG/DL (ref 8.3–10.1)
CHLORIDE SERPL-SCNC: 102 MMOL/L (ref 100–108)
CO2 SERPL-SCNC: 31 MMOL/L (ref 21–32)
CREAT SERPL-MCNC: 1.03 MG/DL (ref 0.6–1.3)
GFR SERPL CREATININE-BSD FRML MDRD: 54 ML/MIN/1.73SQ M
GLUCOSE P FAST SERPL-MCNC: 135 MG/DL (ref 65–99)
POTASSIUM SERPL-SCNC: 4.7 MMOL/L (ref 3.5–5.3)
SODIUM SERPL-SCNC: 139 MMOL/L (ref 136–145)

## 2020-03-12 PROCEDURE — 80048 BASIC METABOLIC PNL TOTAL CA: CPT

## 2020-03-12 PROCEDURE — 36415 COLL VENOUS BLD VENIPUNCTURE: CPT

## 2020-03-12 PROCEDURE — 74018 RADEX ABDOMEN 1 VIEW: CPT

## 2020-03-12 PROCEDURE — 82040 ASSAY OF SERUM ALBUMIN: CPT

## 2020-04-23 ENCOUNTER — TELEPHONE (OUTPATIENT)
Dept: GASTROENTEROLOGY | Facility: CLINIC | Age: 75
End: 2020-04-23

## 2020-05-04 ENCOUNTER — TELEMEDICINE (OUTPATIENT)
Dept: DERMATOLOGY | Facility: CLINIC | Age: 75
End: 2020-05-04
Payer: MEDICARE

## 2020-05-04 DIAGNOSIS — L71.0 PERIORAL DERMATITIS: Primary | ICD-10-CM

## 2020-05-04 PROCEDURE — 99441 PR PHYS/QHP TELEPHONE EVALUATION 5-10 MIN: CPT | Performed by: DERMATOLOGY

## 2020-05-12 ENCOUNTER — TELEPHONE (OUTPATIENT)
Dept: INTERNAL MEDICINE CLINIC | Facility: CLINIC | Age: 75
End: 2020-05-12

## 2020-05-12 ENCOUNTER — OFFICE VISIT (OUTPATIENT)
Dept: INTERNAL MEDICINE CLINIC | Facility: CLINIC | Age: 75
End: 2020-05-12
Payer: MEDICARE

## 2020-05-12 VITALS
OXYGEN SATURATION: 98 % | BODY MASS INDEX: 28.7 KG/M2 | HEIGHT: 63 IN | SYSTOLIC BLOOD PRESSURE: 128 MMHG | WEIGHT: 162 LBS | TEMPERATURE: 98.8 F | HEART RATE: 80 BPM | RESPIRATION RATE: 16 BRPM | DIASTOLIC BLOOD PRESSURE: 90 MMHG

## 2020-05-12 DIAGNOSIS — N20.0 NEPHROLITHIASIS: ICD-10-CM

## 2020-05-12 DIAGNOSIS — T78.40XA ALLERGIC REACTION, INITIAL ENCOUNTER: Primary | ICD-10-CM

## 2020-05-12 DIAGNOSIS — J30.9 ALLERGIC RHINITIS, UNSPECIFIED SEASONALITY, UNSPECIFIED TRIGGER: ICD-10-CM

## 2020-05-12 PROCEDURE — 3008F BODY MASS INDEX DOCD: CPT | Performed by: PHYSICIAN ASSISTANT

## 2020-05-12 PROCEDURE — 4040F PNEUMOC VAC/ADMIN/RCVD: CPT | Performed by: PHYSICIAN ASSISTANT

## 2020-05-12 PROCEDURE — 3074F SYST BP LT 130 MM HG: CPT | Performed by: PHYSICIAN ASSISTANT

## 2020-05-12 PROCEDURE — 1160F RVW MEDS BY RX/DR IN RCRD: CPT | Performed by: PHYSICIAN ASSISTANT

## 2020-05-12 PROCEDURE — 99213 OFFICE O/P EST LOW 20 MIN: CPT | Performed by: PHYSICIAN ASSISTANT

## 2020-05-12 PROCEDURE — 1036F TOBACCO NON-USER: CPT | Performed by: PHYSICIAN ASSISTANT

## 2020-05-12 PROCEDURE — 3080F DIAST BP >= 90 MM HG: CPT | Performed by: PHYSICIAN ASSISTANT

## 2020-05-12 RX ORDER — PREDNISONE 20 MG/1
TABLET ORAL
Qty: 5 TABLET | Refills: 0 | Status: SHIPPED | OUTPATIENT
Start: 2020-05-12 | End: 2020-06-10 | Stop reason: ALTCHOICE

## 2020-05-12 RX ORDER — MOMETASONE FUROATE 50 UG/1
2 SPRAY, METERED NASAL DAILY
Qty: 17 G | Refills: 5 | Status: SHIPPED | OUTPATIENT
Start: 2020-05-12 | End: 2020-11-17

## 2020-05-28 ENCOUNTER — TELEPHONE (OUTPATIENT)
Dept: GASTROENTEROLOGY | Facility: CLINIC | Age: 75
End: 2020-05-28

## 2020-06-03 ENCOUNTER — TELEPHONE (OUTPATIENT)
Dept: GASTROENTEROLOGY | Facility: CLINIC | Age: 75
End: 2020-06-03

## 2020-06-06 DIAGNOSIS — Z20.822 ENCOUNTER FOR LABORATORY TESTING FOR COVID-19 VIRUS: ICD-10-CM

## 2020-06-06 PROCEDURE — U0003 INFECTIOUS AGENT DETECTION BY NUCLEIC ACID (DNA OR RNA); SEVERE ACUTE RESPIRATORY SYNDROME CORONAVIRUS 2 (SARS-COV-2) (CORONAVIRUS DISEASE [COVID-19]), AMPLIFIED PROBE TECHNIQUE, MAKING USE OF HIGH THROUGHPUT TECHNOLOGIES AS DESCRIBED BY CMS-2020-01-R: HCPCS

## 2020-06-08 ENCOUNTER — TELEMEDICINE (OUTPATIENT)
Dept: INTERNAL MEDICINE CLINIC | Facility: CLINIC | Age: 75
End: 2020-06-08
Payer: MEDICARE

## 2020-06-08 VITALS — HEIGHT: 63 IN | WEIGHT: 162 LBS | BODY MASS INDEX: 28.7 KG/M2

## 2020-06-08 DIAGNOSIS — R93.89 ABNORMAL CXR: Primary | ICD-10-CM

## 2020-06-08 DIAGNOSIS — K57.90 DIVERTICULOSIS: ICD-10-CM

## 2020-06-08 LAB — SARS-COV-2 RNA SPEC QL NAA+PROBE: NOT DETECTED

## 2020-06-08 PROCEDURE — 99214 OFFICE O/P EST MOD 30 MIN: CPT | Performed by: PHYSICIAN ASSISTANT

## 2020-06-09 ENCOUNTER — ANESTHESIA EVENT (OUTPATIENT)
Dept: GASTROENTEROLOGY | Facility: HOSPITAL | Age: 75
End: 2020-06-09

## 2020-06-10 ENCOUNTER — HOSPITAL ENCOUNTER (OUTPATIENT)
Dept: GASTROENTEROLOGY | Facility: HOSPITAL | Age: 75
Setting detail: OUTPATIENT SURGERY
Discharge: HOME | End: 2020-06-10
Attending: INTERNAL MEDICINE | Admitting: INTERNAL MEDICINE
Payer: MEDICARE

## 2020-06-10 ENCOUNTER — ANESTHESIA (OUTPATIENT)
Dept: GASTROENTEROLOGY | Facility: HOSPITAL | Age: 75
End: 2020-06-10

## 2020-06-10 VITALS
RESPIRATION RATE: 18 BRPM | TEMPERATURE: 97.6 F | BODY MASS INDEX: 28.36 KG/M2 | DIASTOLIC BLOOD PRESSURE: 71 MMHG | HEIGHT: 63 IN | SYSTOLIC BLOOD PRESSURE: 113 MMHG | OXYGEN SATURATION: 98 % | WEIGHT: 160.05 LBS | HEART RATE: 79 BPM

## 2020-06-10 DIAGNOSIS — Z12.11 SCREENING FOR COLON CANCER: ICD-10-CM

## 2020-06-10 DIAGNOSIS — Z80.0 FAMILY HX OF COLON CANCER: ICD-10-CM

## 2020-06-10 PROCEDURE — G0105 COLORECTAL SCRN; HI RISK IND: HCPCS | Performed by: INTERNAL MEDICINE

## 2020-06-10 RX ORDER — LIDOCAINE HYDROCHLORIDE 10 MG/ML
INJECTION, SOLUTION EPIDURAL; INFILTRATION; INTRACAUDAL; PERINEURAL AS NEEDED
Status: DISCONTINUED | OUTPATIENT
Start: 2020-06-10 | End: 2020-06-10 | Stop reason: SURG

## 2020-06-10 RX ORDER — SODIUM CHLORIDE, SODIUM LACTATE, POTASSIUM CHLORIDE, CALCIUM CHLORIDE 600; 310; 30; 20 MG/100ML; MG/100ML; MG/100ML; MG/100ML
125 INJECTION, SOLUTION INTRAVENOUS CONTINUOUS
Status: DISCONTINUED | OUTPATIENT
Start: 2020-06-10 | End: 2020-06-14 | Stop reason: HOSPADM

## 2020-06-10 RX ORDER — SODIUM CHLORIDE, SODIUM LACTATE, POTASSIUM CHLORIDE, CALCIUM CHLORIDE 600; 310; 30; 20 MG/100ML; MG/100ML; MG/100ML; MG/100ML
INJECTION, SOLUTION INTRAVENOUS CONTINUOUS PRN
Status: DISCONTINUED | OUTPATIENT
Start: 2020-06-10 | End: 2020-06-10 | Stop reason: SURG

## 2020-06-10 RX ORDER — LIDOCAINE HYDROCHLORIDE 10 MG/ML
0.5 INJECTION, SOLUTION EPIDURAL; INFILTRATION; INTRACAUDAL; PERINEURAL ONCE AS NEEDED
Status: DISCONTINUED | OUTPATIENT
Start: 2020-06-10 | End: 2020-06-14 | Stop reason: HOSPADM

## 2020-06-10 RX ORDER — PROPOFOL 10 MG/ML
INJECTION, EMULSION INTRAVENOUS AS NEEDED
Status: DISCONTINUED | OUTPATIENT
Start: 2020-06-10 | End: 2020-06-10 | Stop reason: SURG

## 2020-06-10 RX ADMIN — PROPOFOL 30 MG: 10 INJECTION, EMULSION INTRAVENOUS at 07:33

## 2020-06-10 RX ADMIN — PROPOFOL 20 MG: 10 INJECTION, EMULSION INTRAVENOUS at 07:26

## 2020-06-10 RX ADMIN — PROPOFOL 20 MG: 10 INJECTION, EMULSION INTRAVENOUS at 07:30

## 2020-06-10 RX ADMIN — PROPOFOL 80 MG: 10 INJECTION, EMULSION INTRAVENOUS at 07:25

## 2020-06-10 RX ADMIN — PROPOFOL 20 MG: 10 INJECTION, EMULSION INTRAVENOUS at 07:35

## 2020-06-10 RX ADMIN — LIDOCAINE HYDROCHLORIDE 50 MG: 10 INJECTION, SOLUTION EPIDURAL; INFILTRATION; INTRACAUDAL; PERINEURAL at 07:25

## 2020-06-10 RX ADMIN — PROPOFOL 20 MG: 10 INJECTION, EMULSION INTRAVENOUS at 07:28

## 2020-06-10 RX ADMIN — SODIUM CHLORIDE, SODIUM LACTATE, POTASSIUM CHLORIDE, AND CALCIUM CHLORIDE: .6; .31; .03; .02 INJECTION, SOLUTION INTRAVENOUS at 07:14

## 2020-06-26 DIAGNOSIS — I10 BENIGN HYPERTENSION: ICD-10-CM

## 2020-06-26 RX ORDER — OLMESARTAN MEDOXOMIL 40 MG/1
TABLET ORAL
Qty: 90 TABLET | Refills: 1 | Status: SHIPPED | OUTPATIENT
Start: 2020-06-26 | End: 2020-08-06 | Stop reason: SINTOL

## 2020-08-06 ENCOUNTER — OFFICE VISIT (OUTPATIENT)
Dept: INTERNAL MEDICINE CLINIC | Facility: CLINIC | Age: 75
End: 2020-08-06
Payer: MEDICARE

## 2020-08-06 VITALS
HEART RATE: 100 BPM | TEMPERATURE: 98.6 F | DIASTOLIC BLOOD PRESSURE: 92 MMHG | HEIGHT: 63 IN | WEIGHT: 160 LBS | OXYGEN SATURATION: 99 % | BODY MASS INDEX: 28.35 KG/M2 | SYSTOLIC BLOOD PRESSURE: 144 MMHG | RESPIRATION RATE: 16 BRPM

## 2020-08-06 DIAGNOSIS — I10 BENIGN HYPERTENSION: Primary | ICD-10-CM

## 2020-08-06 PROCEDURE — 4040F PNEUMOC VAC/ADMIN/RCVD: CPT | Performed by: PHYSICIAN ASSISTANT

## 2020-08-06 PROCEDURE — 99213 OFFICE O/P EST LOW 20 MIN: CPT | Performed by: PHYSICIAN ASSISTANT

## 2020-08-06 PROCEDURE — 1036F TOBACCO NON-USER: CPT | Performed by: PHYSICIAN ASSISTANT

## 2020-08-06 PROCEDURE — 1160F RVW MEDS BY RX/DR IN RCRD: CPT | Performed by: PHYSICIAN ASSISTANT

## 2020-08-06 PROCEDURE — 3008F BODY MASS INDEX DOCD: CPT | Performed by: PHYSICIAN ASSISTANT

## 2020-08-06 PROCEDURE — 3080F DIAST BP >= 90 MM HG: CPT | Performed by: PHYSICIAN ASSISTANT

## 2020-08-06 PROCEDURE — 3077F SYST BP >= 140 MM HG: CPT | Performed by: PHYSICIAN ASSISTANT

## 2020-08-06 RX ORDER — AMLODIPINE BESYLATE 5 MG/1
5 TABLET ORAL DAILY
Qty: 30 TABLET | Refills: 1 | Status: SHIPPED | OUTPATIENT
Start: 2020-08-06 | End: 2020-08-30

## 2020-08-06 NOTE — PROGRESS NOTES
Assessment/Plan:      Patient Instructions   High suspicion that dry lips, dry mouth, tongue pain and swelling is related to ARB (olmesartan)  Will discontinue  Start amlodipine 5 mg 1 daily  Recheck blood pressure in 1 month  Sooner as needed  Quality Measures:       Return in about 4 weeks (around 9/3/2020) for Next scheduled follow up  Diagnoses and all orders for this visit:    Benign hypertension  -     amLODIPine (NORVASC) 5 mg tablet; Take 1 tablet (5 mg total) by mouth daily          Subjective:      Patient ID: Wade Tan is a 76 y o  female  Acute visit    Patient in with complaints of ongoing sensation of her tongue being swollen, painful and swollen painful mouth  Despite using non stringent mouthwash, nothing is helping  Acidic foods make her tongue feel burning  Patient was here in May with swelling of her tongue and mouth  Reviewing all medications I am suspicious this is angioedema related to her ARB  She was wondering whether not the medication could have been causing this also  No other focal concerns today        ALLERGIES:  Allergies   Allergen Reactions    Other      "Slovenian food"- swollen tongue      Seasonal Ic [Cholestatin]      [email or web address]         CURRENT MEDICATIONS:    Current Outpatient Medications:     Ascorbic Acid (VITAMIN C) 100 MG tablet, Take 1 tablet by mouth daily, Disp: , Rfl:     B Complex Vitamins (VITAMIN B COMPLEX) TABS, Take 1 tablet by mouth daily, Disp: , Rfl:     cholecalciferol (VITAMIN D3) 1,000 units tablet, Take 1 tablet by mouth daily, Disp: , Rfl:     Coenzyme Q10 (CO Q 10) 10 MG CAPS, Take 1 capsule by mouth daily, Disp: , Rfl:     mometasone (NASONEX) 50 mcg/act nasal spray, 2 sprays into each nostril daily, Disp: 17 g, Rfl: 5    Multiple Vitamins-Minerals (EYE VITAMINS & MINERALS PO), Take by mouth, Disp: , Rfl:     Omega-3 Fatty Acids (FISH OIL PO), , Disp: , Rfl:     Red Yeast Rice 600 MG CAPS, Take by mouth, Disp: , Rfl:     amLODIPine (NORVASC) 5 mg tablet, Take 1 tablet (5 mg total) by mouth daily, Disp: 30 tablet, Rfl: 1    CINNAMON PO, Take by mouth, Disp: , Rfl:     Multiple Vitamins-Minerals (MULTIVITAMIN ADULT PO), Take 1 tablet by mouth daily, Disp: , Rfl:     ACTIVE PROBLEM LIST:  Patient Active Problem List   Diagnosis    Nephrolithiasis    Allergic rhinitis    Benign hypertension    Diverticulosis    Hiatal hernia    Hyperlipidemia    Hyperoxaluria    Depression    Impaired fasting glucose    Diverticulitis    Left shoulder tendonitis    Carpal tunnel syndrome of left wrist       PAST MEDICAL HISTORY:  Past Medical History:   Diagnosis Date    Hypertension     Kidney stones     Pneumonia        PAST SURGICAL HISTORY:  Past Surgical History:   Procedure Laterality Date    DILATION AND CURETTAGE OF UTERUS      JOINT REPLACEMENT      [facility]     [place] CYSTO/URETERO/PYELOSCOPY, DX Right 2/15/2017    Procedure: URETEROSCOPY, STENT PLACEMENT ;  Surgeon: Gregory Linares MD;  [facility];  Service: Urology    [place] PERCUT [address] KID STONE,2+ CM Right 2/15/2017    Procedure: ACCESS INTERPOLAR CALYX, INSERTED TWO WIRES INTO BLADDER, NEPHROLITHOTOMY  PERCUTANEOUS ;  Surgeon: Gregory Linares MD;  [facility];  Service: Urology       FAMILY HISTORY:  Family History   Problem Relation Age of Onset    Cancer Mother     Colon cancer Mother         CARCINOMA     Melanoma Mother     Alzheimer's disease Father     Urinary tract infection Sister     Cancer Brother         BLADDER    Stroke Maternal Grandfather         CVA       SOCIAL HISTORY:  Social History     Socioeconomic History    Marital status: /Civil Union     Spouse name: Not on file    Number of children: 11    Years of education: Not on file    Highest education level: Not on file   Occupational History    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 [employer] Financial resource strain: Not on file    Food insecurity     Worry: Not on file     Inability: Not on file    Transportation needs     Medical: Not on file     Non-medical: Not on file   Tobacco Use    Smoking status: Former Smoker     Packs/day: 1 00     Years: 20 00     Pack years: 20 00    Smokeless tobacco: Former User     Quit date: 2007    Tobacco comment: smokes "very rarely"   Substance and Sexual Activity    Alcohol use: Yes     Frequency: 2-4 times a month     Comment: social <2 GLASSES OF WINE PER WEEK     Drug use: No    Sexual activity: Never     Partners: Male   Lifestyle    Physical activity     Days per week: Not on file     Minutes per session: Not on file    Stress: Not on file   Relationships    Social connections     Talks on phone: Not on file     Gets together: Not on file     Attends [employer] service: Not on file     Active member of club or organization: Not on file     Attends meetings of clubs or organizations: Not on file     Relationship status: Not on file    Intimate partner violence     Fear of current or ex partner: Not on file     Emotionally abused: Not on file     Physically abused: Not on file     Forced sexual activity: Not on file   Other Topics Concern    Not on file   Social History Narrative    ACTIVITIES -- [employer], READING     [address]       Review of Systems   Constitutional: Negative for appetite change, fatigue and fever  HENT: Positive for mouth sores  Negative for congestion, dental problem, drooling, ear discharge, ear pain, postnasal drip, rhinorrhea, sinus pressure, sinus pain, sneezing, sore throat and trouble swallowing  Eyes: Negative for redness and itching  Respiratory: Negative for cough, chest tightness and shortness of breath  Cardiovascular: Negative for chest pain, palpitations and leg swelling     Endocrine: Negative for polydipsia, polyphagia and polyuria  Genitourinary: Negative for difficulty urinating  Musculoskeletal: Negative for arthralgias  Allergic/Immunologic: Negative for immunocompromised state  Neurological: Negative for dizziness, tremors, weakness, light-headedness, numbness and headaches  Hematological: Negative for adenopathy  Psychiatric/Behavioral: Negative for sleep disturbance  Objective:  Vitals:    08/06/20 1425   BP: 144/92   BP Location: Right arm   Patient Position: Sitting   Cuff Size: Standard   Pulse: 100   Resp: 16   Temp: 98 6 °F (37 °C)   TempSrc: Tympanic   SpO2: 99%   Weight: 72 6 kg (160 lb)   Height: 5' 3" (1 6 m)     Body mass index is 28 34 kg/m²  Physical Exam   Constitutional: She is oriented to person, place, and time  She appears well-developed  No distress  HENT:   Head: Normocephalic and atraumatic  Eyes: Pupils are equal, round, and reactive to light  Neck: No JVD present  Carotid bruit is not present  Cardiovascular: Normal rate, regular rhythm and normal heart sounds  Pulmonary/Chest: Effort normal and breath sounds normal  No respiratory distress  Musculoskeletal:      Right lower leg: No edema  Left lower leg: No edema  Lymphadenopathy:     She has no cervical adenopathy  Neurological: She is alert and oriented to person, place, and time  Skin: Skin is warm and dry  No rash noted  No jaundice  Psychiatric: Her behavior is normal  Mood normal    Nursing note and vitals reviewed  RESULTS:    No results found for this or any previous visit (from the past 1008 hour(s))  This note was created with voice recognition software  Phonic, grammatical and spelling errors may be present within the note as a result

## 2020-08-06 NOTE — PATIENT INSTRUCTIONS
High suspicion that dry lips, dry mouth, tongue pain and swelling is related to ARB (olmesartan)  Will discontinue  Start amlodipine 5 mg 1 daily  Recheck blood pressure in 1 month  Sooner as needed

## 2020-08-29 DIAGNOSIS — I10 BENIGN HYPERTENSION: ICD-10-CM

## 2020-08-30 RX ORDER — AMLODIPINE BESYLATE 5 MG/1
TABLET ORAL
Qty: 30 TABLET | Refills: 1 | Status: SHIPPED | OUTPATIENT
Start: 2020-08-30 | End: 2020-09-24 | Stop reason: ALTCHOICE

## 2020-09-01 ENCOUNTER — OFFICE VISIT (OUTPATIENT)
Dept: UROLOGY | Facility: CLINIC | Age: 75
End: 2020-09-01
Payer: MEDICARE

## 2020-09-01 VITALS
HEIGHT: 63 IN | DIASTOLIC BLOOD PRESSURE: 98 MMHG | WEIGHT: 160 LBS | HEART RATE: 102 BPM | SYSTOLIC BLOOD PRESSURE: 162 MMHG | TEMPERATURE: 98 F | BODY MASS INDEX: 28.35 KG/M2

## 2020-09-01 DIAGNOSIS — N20.0 NEPHROLITHIASIS: Primary | ICD-10-CM

## 2020-09-01 PROCEDURE — 99214 OFFICE O/P EST MOD 30 MIN: CPT | Performed by: UROLOGY

## 2020-09-01 RX ORDER — PERPHENAZINE/AMITRIPTYLINE HCL 4 MG-25 MG
TABLET ORAL
COMMUNITY
End: 2021-07-07 | Stop reason: ALTCHOICE

## 2020-09-01 NOTE — PROGRESS NOTES
Referring Physician: Tonya Mccain MD  A copy of this note was sent to the referring physician  Diagnoses and all orders for this visit:    Nephrolithiasis  -     Ambulatory referral to Urology  -     Cancel: POCT urine dip  -     Cancel: Urine culture  -     CT renal stone study abdomen pelvis wo contrast; Future    Other orders  -     Lutein 40 MG CAPS; Take by mouth            Assessment and plan:       1  Greater than 1 5 cm right lower pole calculus  -  Present onKUB ,  No recent axial imaging  - previously measured 1 cm on prior CT  -  Status post prior right PCNL February 2017     2  Recurrent nephrolithiasis  -  Patient was previously evaluated by Nephrology for metabolic workup but was noncompliant with follow-up     we reviewed her KUB which demonstrates a greater than 1 5 cm lower pole calculus  She is symptomatic with intermittent discomfort  My recommendation was for axial imaging  We discussed that based upon the size of her stone on KUB I suspect I will offer her a additional percutaneous nephrolithotomy which she tolerated well 3 years prior     stone protocol CT to be completed in the near future and she will follow up with me thereafter to review surgical options      Shavonne Isaac MD      Chief Complaint      stone evaluation      History of Present Illness     Norris Sethi is a 76 y o  Female referred in consultation  She is a 80-year-old female with a known history of recurrent nephrolithiasis  I performed a right-sided percutaneous nephrolithotomy in  [facility] 2017  She did have a known subcentimeter residual fragment  She has not followed up in a number of years and was last seen by my p in March of 2017     he has had ureteroscopy performed within the past few years which she says she tolerated poorly  This was performed by another provider  A prior KUB performed 6 months ago demonstrated a 1 5 cm right lower pole calculus    She has no had no additional imaging or axial imaging  Patient tells me that she is symptomatic with intermittent right CVA tenderness and back pain  He would like to discuss surgical options for her residual stone  She was previously referred to Nephrology for metabolic evaluation but he failed to follow-up with them    Detailed Urologic History     - please refer to HPI    Review of Systems     Review of Systems   Constitutional: Negative for activity change and fatigue  HENT: Negative for congestion  Eyes: Negative for visual disturbance  Respiratory: Negative for shortness of breath and wheezing  Cardiovascular: Negative for chest pain and leg swelling  Gastrointestinal: Negative for abdominal pain  Genitourinary: Positive for flank pain  Negative for dysuria, hematuria and urgency  Musculoskeletal: Negative for back pain  Allergic/Immunologic: Negative for immunocompromised state  Neurological: Negative for dizziness and numbness  Psychiatric/Behavioral: Negative for dysphoric mood  All other systems reviewed and are negative  Allergies     Allergies   Allergen Reactions    Other      "[place] food"- swollen tongue      Seasonal Ic [Cholestatin]      swollentongue         Physical Exam     Physical Exam  Constitutional:       Appearance: She is well-developed  HENT:      Head: Normocephalic and atraumatic  Eyes:      Pupils: Pupils are equal, round, and reactive to light  Neck:      Musculoskeletal: Normal range of motion  Cardiovascular:      Comments:  Negative lower extremity edema  Pulmonary:      Effort: Pulmonary effort is normal       Breath sounds: Normal breath sounds  Abdominal:      Palpations: Abdomen is soft  Genitourinary:     Vagina: Normal    Musculoskeletal: Normal range of motion  Skin:     General: Skin is warm  Neurological:      Mental Status: She is alert and oriented to person, place, and time               Vital Signs  There were no vitals filed for this visit        Current Medications       Current Outpatient Medications:     amLODIPine (NORVASC) 5 mg tablet, TAKE 1 TABLET BY MOUTH EVERY DAY, Disp: 30 tablet, Rfl: 1    Ascorbic Acid (VITAMIN C) 100 MG tablet, Take 1 tablet by mouth daily, Disp: , Rfl:     B Complex Vitamins (VITAMIN B COMPLEX) TABS, Take 1 tablet by mouth daily, Disp: , Rfl:     cholecalciferol (VITAMIN D3) 1,000 units tablet, Take 1 tablet by mouth daily, Disp: , Rfl:     CINNAMON PO, Take by mouth, Disp: , Rfl:     Coenzyme Q10 (CO Q 10) 10 MG CAPS, Take 1 capsule by mouth daily, Disp: , Rfl:     mometasone (NASONEX) 50 mcg/act nasal spray, 2 sprays into each nostril daily, Disp: 17 g, Rfl: 5    Multiple Vitamins-Minerals (EYE VITAMINS & MINERALS PO), Take by mouth, Disp: , Rfl:     Multiple Vitamins-Minerals (MULTIVITAMIN ADULT PO), Take 1 tablet by mouth daily, Disp: , Rfl:     Omega-3 Fatty Acids (FISH OIL PO), , Disp: , Rfl:     Red Yeast Rice 600 MG CAPS, Take by mouth, Disp: , Rfl:       Active Problems     Patient Active Problem List   Diagnosis    Nephrolithiasis    Allergic rhinitis    Benign hypertension    Diverticulosis    Hiatal hernia    Hyperlipidemia    Hyperoxaluria    Depression    Impaired fasting glucose    Diverticulitis    Left shoulder tendonitis    Carpal tunnel syndrome of left wrist         Past Medical History     Past Medical History:   Diagnosis Date    Hypertension     Kidney stones     Pneumonia          Surgical History     Past Surgical History:   Procedure Laterality Date    DILATION AND CURETTAGE OF UTERUS      JOINT REPLACEMENT      arm muscle & tendon surgery [facility]     OR CYSTO/URETERO/PYELOSCOPY, DX Right 2/15/2017    Procedure: URETEROSCOPY, STENT PLACEMENT ;  Surgeon: Nils Herndon MD;  [facility];  Service: Urology    OR PERCUT [address] [facility] KID STONE,2+ CM Right 2/15/2017    Procedure: ACCESS INTERPOLAR CALYX, INSERTED TWO WIRES INTO BLADDER, NEPHROLITHOTOMY  PERCUTANEOUS ;  Surgeon: Kailyn Cook MD;  [facility];  Service: Urology         Family History     Family History   Problem Relation Age of Onset    Cancer Mother     Colon cancer Mother         CARCINOMA     Melanoma Mother     Alzheimer's disease Father     Urinary tract infection Sister     Cancer Brother         BLADDER    Stroke Maternal Grandfather         CVA         Social History     Social History     Social History     Tobacco Use   Smoking Status Former Smoker    Packs/day: 1 00    Years: 20 00    Pack years: 20 00   Smokeless Tobacco Former User    Quit date: 2007   Tobacco Comment    smokes "very rarely"         Pertinent Lab Values     Lab Results   Component Value Date    CREATININE 1 03 03/12/2020       No results found for: PSA    @RESULTRCNT(1H])@      Pertinent Imaging      - n/a    Portions of the record may have been created with voice recognition software   Occasional wrong word or "sound a like" substitutions may have occurred due to the inherent limitations of voice recognition software   Read the chart carefully and recognize, using context, where substitutions have occurred

## 2020-09-08 ENCOUNTER — OFFICE VISIT (OUTPATIENT)
Dept: INTERNAL MEDICINE CLINIC | Facility: CLINIC | Age: 75
End: 2020-09-08
Payer: MEDICARE

## 2020-09-08 VITALS
WEIGHT: 160 LBS | DIASTOLIC BLOOD PRESSURE: 88 MMHG | HEART RATE: 91 BPM | BODY MASS INDEX: 28.35 KG/M2 | OXYGEN SATURATION: 98 % | SYSTOLIC BLOOD PRESSURE: 128 MMHG | HEIGHT: 63 IN | TEMPERATURE: 99.3 F | RESPIRATION RATE: 16 BRPM

## 2020-09-08 DIAGNOSIS — I10 BENIGN HYPERTENSION: ICD-10-CM

## 2020-09-08 DIAGNOSIS — Z00.00 ENCOUNTER FOR MEDICARE ANNUAL WELLNESS EXAM: Primary | ICD-10-CM

## 2020-09-08 DIAGNOSIS — Z11.59 NEED FOR HEPATITIS C SCREENING TEST: ICD-10-CM

## 2020-09-08 DIAGNOSIS — Z12.39 SCREENING FOR MALIGNANT NEOPLASM OF BREAST: ICD-10-CM

## 2020-09-08 DIAGNOSIS — H04.123 DRY EYES: ICD-10-CM

## 2020-09-08 DIAGNOSIS — E78.2 MIXED HYPERLIPIDEMIA: ICD-10-CM

## 2020-09-08 DIAGNOSIS — Z12.39 SCREENING FOR BREAST CANCER: ICD-10-CM

## 2020-09-08 DIAGNOSIS — R73.01 IMPAIRED FASTING GLUCOSE: ICD-10-CM

## 2020-09-08 DIAGNOSIS — R68.2 DRY MOUTH: ICD-10-CM

## 2020-09-08 PROCEDURE — G0438 PPPS, INITIAL VISIT: HCPCS | Performed by: PHYSICIAN ASSISTANT

## 2020-09-08 PROCEDURE — 99214 OFFICE O/P EST MOD 30 MIN: CPT | Performed by: PHYSICIAN ASSISTANT

## 2020-09-08 NOTE — PROGRESS NOTES
Assessment and Plan:   Patient will stop the red yeast rice at this time  She will also have requested labs done  When available we will discuss results and whether not stopping the red yeast rice has improved her dry mouth, dry eyes  Anticipate then switching her back to the olmesartan from the amlodipine  Schedule follow-up in 3 months, sooner as needed  Problem List Items Addressed This Visit     None           Preventive health issues were discussed with patient, and age appropriate screening tests were ordered as noted in patient's After Visit Summary  Personalized health advice and appropriate referrals for health education or preventive services given if needed, as noted in patient's After Visit Summary  History of Present Illness:     Patient presents for Medicare annual wellness visit  Questionnaire has been reviewed, clarified, and updated with patient today  Patient Care Team:  Oseas Amaya MD as PCP - General (Internal Medicine)  Jossue Rojo MD as Consulting Physician (Nephrology)  Fernie Culp MD as Consulting Physician (Urology)  Frederic Saavedra PA-C as Physician Assistant (Internal Medicine)     Review of Systems:     Review of Systems   Constitutional: Negative for activity change, chills, fatigue and fever  HENT: Negative for congestion  Eyes: Negative for discharge  Respiratory: Negative for cough, chest tightness and shortness of breath  Cardiovascular: Negative for chest pain, palpitations and leg swelling  Gastrointestinal: Negative for abdominal pain, blood in stool, constipation, diarrhea, nausea and vomiting  Endocrine: Negative for polydipsia, polyphagia and polyuria  Genitourinary: Negative for difficulty urinating  Musculoskeletal: Negative for arthralgias and myalgias  Skin: Negative for rash  Allergic/Immunologic: Negative for immunocompromised state     Neurological: Negative for dizziness, syncope, weakness, light-headedness and headaches  Hematological: Negative for adenopathy  Does not bruise/bleed easily  Psychiatric/Behavioral: Negative for dysphoric mood, sleep disturbance and suicidal ideas  The patient is not nervous/anxious         Problem List:     Patient Active Problem List   Diagnosis    Nephrolithiasis    Allergic rhinitis    Benign hypertension    Diverticulosis    Hiatal hernia    Hyperlipidemia    Hyperoxaluria    Depression    Impaired fasting glucose    Diverticulitis    Left shoulder tendonitis    Carpal tunnel syndrome of left wrist      Past Medical and Surgical History:     Past Medical History:   Diagnosis Date    Hypertension     Kidney stones     Pneumonia      Past Surgical History:   Procedure Laterality Date    CATARACT EXTRACTION      DILATION AND CURETTAGE OF UTERUS      JOINT REPLACEMENT      arm muscle [facility] surgery [facility]     TX CYSTO/URETERO/PYELOSCOPY, DX Right 2/15/2017    Procedure: URETEROSCOPY, STENT PLACEMENT ;  Surgeon: Iftikhar Villalpando MD;  [facility];  Service: Urology    TX PERCUT [address] [facility] KID STONE,2+ CM Right 2/15/2017    Procedure: ACCESS INTERPOLAR CALYX, INSERTED TWO WIRES INTO BLADDER, NEPHROLITHOTOMY  PERCUTANEOUS ;  Surgeon: Iftikhar Villalpando MD;  [facility];  Service: Urology      Family History:     Family History   Problem Relation Age of Onset    Cancer Mother     Colon cancer Mother         CARCINOMA     Melanoma Mother     Alzheimer's disease Father     Urinary tract infection Sister     Cancer Brother         BLADDER    Stroke Maternal Grandfather         CVA      Social History:        Social History     Socioeconomic History    Marital status: /Civil Union     Spouse name: Not on file    Number of children: 5    Years of education: Not on file    Highest education level: Not on file   Occupational History    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 [employer] strain: Not on file    Food insecurity     Worry: Not on file     Inability: Not on file    Transportation needs     Medical: Not on file     Non-medical: Not on file   Tobacco Use    Smoking status: Former Smoker     Packs/day: 1 00     Years: 20 00     Pack years: 20 00    Smokeless tobacco: Former User     Quit date: 2007    Tobacco comment: smokes "very rarely"   Substance and Sexual Activity    Alcohol use: Yes     Frequency: 2-4 times a month     Comment: social <2 GLASSES OF WINE PER WEEK     Drug use: No    Sexual activity: Never     Partners: Male   Lifestyle    Physical activity     Days per week: Not on file     Minutes per session: Not on file    Stress: Not on file   Relationships    Social connections     Talks on phone: Not on file     Gets together: Not on file     Attends Judaism service: Not on file     Active member of club or organization: Not on file     Attends meetings of clubs or organizations: Not on file     Relationship status: Not on file    Intimate partner violence     Fear of current or ex partner: Not on file     Emotionally abused: Not on file     Physically abused: Not on file     Forced sexual activity: Not on file   Other Topics Concern    Not on file   Social History Narrative    ACTIVITIES -- COMPUTERS, READING     BRUSHES TEETH TWICE A DAY     DAILY CAFFEINE CONSUMPTION 1 SERVING A DAY     DENTAL CARE REGULARLY      Medications and Allergies:     Current Outpatient Medications   Medication Sig Dispense Refill    amLODIPine (NORVASC) 5 mg tablet TAKE 1 TABLET BY MOUTH EVERY DAY 30 tablet 1    Ascorbic Acid (VITAMIN C) 100 MG tablet Take 1 tablet by mouth daily      B Complex Vitamins (VITAMIN B COMPLEX) TABS Take 1 tablet by mouth daily      cholecalciferol (VITAMIN D3) 1,000 units tablet Take 1 tablet by mouth daily      CINNAMON PO Take by mouth      Coenzyme Q10 (CO Q 10) 10 MG CAPS Take 1 capsule by mouth daily      Lutein 40 MG CAPS Take by mouth      mometasone (NASONEX) 50 mcg/act nasal spray 2 sprays into each nostril daily 17 g 5    Multiple Vitamins-Minerals (EYE VITAMINS & MINERALS PO) Take by mouth      Multiple Vitamins-Minerals (MULTIVITAMIN ADULT PO) Take 1 tablet by mouth daily      Omega-3 Fatty Acids (FISH OIL PO)       Red Yeast Rice 600 MG CAPS Take by mouth       No current facility-administered medications for this visit  Allergies   Allergen Reactions    Other      "Serbian food"- swollen tongue      Seasonal Ic [Cholestatin]      swollentongue        Immunizations:     Immunization History   Administered Date(s) Administered    INFLUENZA 11/07/2017, 11/07/2017, 09/13/2018    Influenza Split High Dose Preservative Free IM 11/29/2016    Influenza, high dose seasonal 0 7 mL 09/13/2018    Pneumococcal Conjugate 13-Valent 08/22/2017    Pneumococcal Polysaccharide PPV23 09/22/2014, 01/29/2020    Tdap 10/25/2017      Health Maintenance:         Topic Date Due    Hepatitis C Screening  1945    MAMMOGRAM  05/16/2019         Topic Date Due    Influenza Vaccine  07/01/2020      Medicare Screening Tests and Risk Assessments:     Jarad Philip is here for her Subsequent Wellness visit  Health Risk Assessment:   Patient rates overall health as excellent  Patient feels that their physical health rating is same  Eyesight was rated as same  Hearing was rated as slightly worse  Patient feels that their emotional and mental health rating is same  Pain experienced in the last 7 days has been none  Patient states that she has experienced no weight loss or gain in last 6 months  Depression Screening:   PHQ-2 Score: 0  PHQ-9 Score: 0      Fall Risk Screening: In the past year, patient has experienced: no history of falling in past year      Urinary Incontinence Screening:   Patient has not leaked urine accidently in the last six months  Home Safety:  Patient does not have trouble with stairs inside or outside of their home   Patient has working smoke alarms and has working carbon monoxide detector  Home safety hazards include: none  Nutrition:   Current diet is Regular  Medications:   Patient is currently taking over-the-counter supplements  OTC medications include: see medication list  Patient is able to manage medications  Activities of Daily Living (ADLs)/Instrumental Activities of Daily Living (IADLs):   Walk and transfer into and out of bed and chair?: Yes  Dress and groom yourself?: Yes    Bathe or shower yourself?: Yes    Feed yourself? Yes  Do your laundry/housekeeping?: Yes  Manage your money, pay your bills and track your expenses?: Yes  Make your own meals?: Yes    Do your own shopping?: Yes    Previous Hospitalizations:   Any hospitalizations or ED visits within the last 12 months?: No      Advance Care Planning:   Living will: No    Durable POA for healthcare: No    Advanced directive: No    Five wishes given: No      Comments: Currently not interested    Cognitive Screening:   Provider or family/friend/caregiver concerned regarding cognition?: No    PREVENTIVE SCREENINGS      Cardiovascular Screening:    General: Screening Not Indicated, History Lipid Disorder and Screening Current      Diabetes Screening:     General: Screening Current      Colorectal Cancer Screening:     General: Screening Current      Breast Cancer Screening:       Due for: Mammogram        Cervical Cancer Screening:    General: Screening Not Indicated      Abdominal Aortic Aneurysm (AAA) Screening:        General: Screening Current      Lung Cancer Screening:     General: Screening Not Indicated      Hepatitis C Screening:      Hep C Screening Accepted: Yes      No exam data present     Physical Exam:     There were no vitals taken for this visit  Physical Exam  Constitutional:       General: She is not in acute distress  Appearance: Normal appearance  HENT:      Head: Normocephalic     Eyes:      Pupils: Pupils are equal, round, and reactive to light  Neck:      Musculoskeletal: Neck supple  Thyroid: No thyromegaly  Vascular: No carotid bruit  Trachea: Trachea normal    Cardiovascular:      Rate and Rhythm: Normal rate and regular rhythm  Heart sounds: No murmur  Pulmonary:      Effort: Pulmonary effort is normal  No respiratory distress  Breath sounds: Normal breath sounds  Musculoskeletal:         General: No swelling  Right lower leg: No edema  Left lower leg: No edema  Lymphadenopathy:      Cervical: No cervical adenopathy  Skin:     General: Skin is warm and dry  Findings: No rash  Neurological:      General: No focal deficit present  Mental Status: She is alert and oriented to person, place, and time  Mental status is at baseline     Psychiatric:         Mood and Affect: Mood normal          Behavior: Behavior normal           Leland Ayala PA-C

## 2020-09-08 NOTE — PROGRESS NOTES
Assessment/Plan:     Patient will stop the red yeast rice at this time  She will also have requested labs done  When available we will discuss results and whether not stopping the red yeast rice has improved her dry mouth, dry eyes  Anticipate then switching her back to the olmesartan from the amlodipine  Schedule follow-up in 3 months, sooner as needed  Quality Measures:       Return in about 3 months (around 12/8/2020) for Next scheduled follow up  Diagnoses and all orders for this visit:    Encounter for Medicare annual wellness exam    Benign hypertension  -     CBC and differential; Future  -     Comprehensive metabolic panel; Future    Impaired fasting glucose  -     Hemoglobin A1C; Future    Mixed hyperlipidemia  -     Lipid panel; Future    Dry mouth  -     Sjogren's Antibodies; Future  -     OWEN Screen w/ Reflex to Titer/Pattern; Future    Dry eyes  -     Sjogren's Antibodies; Future  -     OWEN Screen w/ Reflex to Titer/Pattern; Future    Need for hepatitis C screening test  -     Hepatitis C antibody; Future    Screening for malignant neoplasm of breast    Screening for breast cancer    Other orders  -     Cancel: Mammo screening bilateral w cad; Future          Subjective:      Patient ID: Emy Arnold is a 76 y o  female  Follow-up    Patient continues to complain of dry mouth and now dry eyes  At previous visit she was certain it was related to her antihypertensive medication which was changed at that time  Despite this the symptoms persist   She states her eyes her so dry that even moisturizing eyedrops are not beneficial     Hyperlipidemia:  Currently on red yeast rice  Will need to monitor  Impaired fasting glucose: This also needs to be monitored with A1c  Recurrent kidney stones: Following with Urology  To have repeat renal stone CT study coming up in early October  Hepatitis C screening discussed  Patient in agreement      Hypertension:  Decent control on current medication  Michelle cp, palp, sob, edema, HA, dizziness, diaphoresis, syncope, visual disturbance        ALLERGIES:  Allergies   Allergen Reactions    Other      "Burkinan food"- swollen tongue      Seasonal Ic [Cholestatin]      [email or web address]         CURRENT MEDICATIONS:    Current Outpatient Medications:     amLODIPine (NORVASC) 5 mg tablet, TAKE 1 TABLET BY MOUTH EVERY DAY, Disp: 30 tablet, Rfl: 1    Ascorbic Acid (VITAMIN C) 100 MG tablet, Take 1 tablet by mouth daily, Disp: , Rfl:     B Complex Vitamins (VITAMIN B COMPLEX) TABS, Take 1 tablet by mouth daily, Disp: , Rfl:     cholecalciferol (VITAMIN D3) 1,000 units tablet, Take 1 tablet by mouth daily, Disp: , Rfl:     CINNAMON PO, Take by mouth, Disp: , Rfl:     Coenzyme Q10 (CO Q 10) 10 MG CAPS, Take 1 capsule by mouth daily, Disp: , Rfl:     Lutein 40 MG CAPS, Take by mouth, Disp: , Rfl:     mometasone (NASONEX) 50 mcg/act nasal spray, 2 sprays into each nostril daily, Disp: 17 g, Rfl: 5    Multiple Vitamins-Minerals (EYE VITAMINS & MINERALS PO), Take by mouth, Disp: , Rfl:     Multiple Vitamins-Minerals (MULTIVITAMIN ADULT PO), Take 1 tablet by mouth daily, Disp: , Rfl:     Omega-3 Fatty Acids (FISH OIL PO), , Disp: , Rfl:     Red Yeast Rice 600 MG CAPS, Take by mouth, Disp: , Rfl:     ACTIVE PROBLEM LIST:  Patient Active Problem List   Diagnosis    Nephrolithiasis    Allergic rhinitis    Benign hypertension    Diverticulosis    Hiatal hernia    Hyperlipidemia    Hyperoxaluria    Depression    Impaired fasting glucose    Diverticulitis    Left shoulder tendonitis    Carpal tunnel syndrome of left wrist       PAST MEDICAL HISTORY:  Past Medical History:   Diagnosis Date    Hypertension     Kidney stones     Pneumonia        PAST SURGICAL HISTORY:  Past Surgical History:   Procedure Laterality Date    CATARACT EXTRACTION      DILATION AND CURETTAGE OF UTERUS      JOINT REPLACEMENT      arm muscle & tendon [facility]     [place] CYSTO/URETERO/PYELOSCOPY, DX Right 2/15/2017    Procedure: URETEROSCOPY, STENT PLACEMENT ;  Surgeon: Saintclair Lah, MD;  [facility]: [facility];  Service: Urology    [place] PERCUT [address] KID STONE,2+ CM Right 2/15/2017    Procedure: ACCESS INTERPOLAR CALYX, INSERTED TWO WIRES INTO BLADDER, NEPHROLITHOTOMY  PERCUTANEOUS ;  Surgeon: Saintclair Lah, MD;  [facility];  Service: Urology       FAMILY HISTORY:  Family History   Problem Relation Age of Onset    Cancer Mother     Colon cancer Mother         CARCINOMA     Melanoma Mother     Alzheimer's disease Father     Urinary tract infection Sister     Cancer Brother         BLADDER    Stroke Maternal Grandfather         CVA       SOCIAL HISTORY:  Social History     Socioeconomic History    Marital status: /Civil Union     Spouse name: Not on file    Number of children: 5    Years of education: Not on file    Highest education level: Not on file   Occupational History    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 resource strain: Not on file    Food insecurity     Worry: Not on file     Inability: Not on file    Transportation needs     Medical: Not on file     Non-medical: Not on file   Tobacco Use    Smoking status: Former Smoker     Packs/day: 1 00     Years: 20 00     Pack years: 20 00    Smokeless tobacco: Former User     Quit date: 2007    Tobacco comment: smokes "very rarely"   Substance and Sexual Activity    Alcohol use: Yes     Frequency: 2-4 times a month     Comment: social <2 GLASSES OF WINE PER WEEK     Drug use: No    Sexual activity: Never     Partners: Male   Lifestyle    Physical activity     Days per week: Not on file     Minutes per session: Not on file    Stress: Not on file   Relationships    Social connections     Talks on phone: Not on file     Gets together: Not on file     Attends Jehovah's witness service: Not on file     Active member of club or organization: Not on file     Attends meetings of clubs or organizations: Not on file     Relationship status: Not on file    Intimate partner violence     Fear of current or ex partner: Not on file     Emotionally abused: Not on file     Physically abused: Not on file     Forced sexual activity: Not on file   Other Topics Concern    Not on file   Social History Narrative    ACTIVITIES -- COMPUTERS, READING     [email or web address]       Review of Systems   Constitutional: Negative for activity change, chills, fatigue and fever  HENT: Negative for congestion  Eyes: Negative for discharge  Respiratory: Negative for cough, chest tightness and shortness of breath  Cardiovascular: Negative for chest pain, palpitations and leg swelling  Gastrointestinal: Negative for abdominal pain, blood in stool, constipation, diarrhea, nausea and vomiting  Endocrine: Negative for polydipsia, polyphagia and polyuria  Genitourinary: Negative for difficulty urinating  Musculoskeletal: Negative for arthralgias and myalgias  Skin: Negative for rash  Allergic/Immunologic: Negative for immunocompromised state  Neurological: Negative for dizziness, syncope, weakness, light-headedness and headaches  Hematological: Negative for adenopathy  Does not bruise/bleed easily  Psychiatric/Behavioral: Negative for dysphoric mood, sleep disturbance and suicidal ideas  The patient is not nervous/anxious  Objective:  Vitals:    09/08/20 1058   BP: 128/88   BP Location: Right arm   Patient Position: Sitting   Cuff Size: Standard   Pulse: 91   Resp: 16   Temp: 99 3 °F (37 4 °C)   TempSrc: Tympanic   SpO2: 98%   Weight: 72 6 kg (160 lb)   Height: 5' 3" (1 6 m)     Body mass index is 28 34 kg/m²  Physical Exam  Vitals signs and nursing note reviewed  Constitutional:       General: She is not in acute distress  Appearance: She is well-developed  HENT:      Head: Normocephalic and atraumatic  Eyes:      Conjunctiva/sclera: Conjunctivae normal       Pupils: Pupils are equal, round, and reactive to light  Neck:      Vascular: No carotid bruit or JVD  Cardiovascular:      Rate and Rhythm: Normal rate and regular rhythm  Heart sounds: Normal heart sounds  Pulmonary:      Effort: Pulmonary effort is normal  No respiratory distress  Breath sounds: Normal breath sounds  Musculoskeletal:         General: No swelling  Right lower leg: No edema  Left lower leg: No edema  Lymphadenopathy:      Cervical: No cervical adenopathy  Skin:     General: Skin is warm and dry  Findings: No rash  Neurological:      General: No focal deficit present  Mental Status: She is alert and oriented to person, place, and time  Mental status is at baseline  Psychiatric:         Mood and Affect: Mood normal          Behavior: Behavior normal            RESULTS:    No results found for this or any previous visit (from the past 1008 hour(s))  This note was created with voice recognition software  Phonic, grammatical and spelling errors may be present within the note as a result

## 2020-09-08 NOTE — PATIENT INSTRUCTIONS
Patient will stop the red yeast rice at this time  She will also have requested labs done  When available we will discuss results and whether not stopping the red yeast rice has improved her dry mouth, dry eyes  Anticipate then switching her back to the olmesartan from the amlodipine  Schedule follow-up in 3 months, sooner as needed  Medicare Preventive Visit Patient Instructions  Thank you for completing your Welcome to Medicare Visit or Medicare Annual Wellness Visit today  Your next wellness visit will be due in one year (9/8/2021)  The screening/preventive services that you may require over the next 5-10 years are detailed below  Some tests may not apply to you based off risk factors and/or age  Screening tests ordered at today's visit but not completed yet may show as past due  Also, please note that scanned in results may not display below  Preventive Screenings:  Service Recommendations Previous Testing/Comments   Colorectal Cancer Screening  * Colonoscopy    * Fecal Occult Blood Test (FOBT)/Fecal Immunochemical Test (FIT)  * Fecal DNA/Cologuard Test  * Flexible Sigmoidoscopy Age: 54-65 years old   Colonoscopy: every 10 years (may be performed more frequently if at higher risk)  OR  FOBT/FIT: every 1 year  OR  Cologuard: every 3 years  OR  Sigmoidoscopy: every 5 years  Screening may be recommended earlier than age 48 if at higher risk for colorectal cancer  Also, an individualized decision between you and your healthcare provider will decide whether screening between the ages of 74-80 would be appropriate  Colonoscopy: 06/10/2020  FOBT/FIT: Not on file  Cologuard: Not on file  Sigmoidoscopy: Not on file    Screening Current     Breast Cancer Screening Age: 36 years old  Frequency: every 1-2 years  Not required if history of left and right mastectomy Mammogram: 05/16/2018       Cervical Cancer Screening Between the ages of 21-29, pap smear recommended once every 3 years     Between the ages of 33-67, can perform pap smear with HPV co-testing every 5 years  Recommendations may differ for women with a history of total hysterectomy, cervical cancer, or abnormal pap smears in past  Pap Smear: Not on file    Screening Not Indicated   Hepatitis C Screening Once for adults born between 1945 and 1965  More frequently in patients at high risk for Hepatitis C Hep C Antibody: Not on file       Diabetes Screening 1-2 times per year if you're at risk for diabetes or have pre-diabetes Fasting glucose: 135 mg/dL   A1C: 5 7 %    Screening Current   Cholesterol Screening Once every 5 years if you don't have a lipid disorder  May order more often based on risk factors  Lipid panel: 08/08/2019    Screening Not Indicated  History Lipid Disorder     Other Preventive Screenings Covered by Medicare:  1  Abdominal Aortic Aneurysm (AAA) Screening: covered once if your at risk  You're considered to be at risk if you have a family history of AAA  2  Lung Cancer Screening: covers low dose CT scan once per year if you meet all of the following conditions: (1) Age 50-69; (2) No signs or symptoms of lung cancer; (3) Current smoker or have quit smoking within the last 15 years; (4) You have a tobacco smoking history of at least 30 pack years (packs per day multiplied by number of years you smoked); (5) You get a written order from a healthcare provider  3  Glaucoma Screening: covered annually if you're considered high risk: (1) You have diabetes OR (2) Family history of glaucoma OR (3)  aged 48 and older OR (3)  American aged 72 and older  3   Osteoporosis Screening: covered every 2 years if you meet one of the following conditions: (1) You're estrogen deficient and at risk for osteoporosis based off medical history and other findings; (2) Have a vertebral abnormality; (3) On glucocorticoid therapy for more than 3 months; (4) Have primary hyperparathyroidism; (5) On osteoporosis medications and need to assess response to drug therapy  · Last bone density test (DXA Scan): Not on file  5  HIV Screening: covered annually if you're between the age of 12-76  Also covered annually if you are younger than 13 and older than 72 with risk factors for HIV infection  For pregnant patients, it is covered up to 3 times per pregnancy  Immunizations:  Immunization Recommendations   Influenza Vaccine Annual influenza vaccination during flu season is recommended for all persons aged >= 6 months who do not have contraindications   Pneumococcal Vaccine (Prevnar and Pneumovax)  * Prevnar = PCV13  * Pneumovax = PPSV23   Adults 25-60 years old: 1-3 doses may be recommended based on certain risk factors  Adults 72 years old: Prevnar (PCV13) vaccine recommended followed by Pneumovax (PPSV23) vaccine  If already received PPSV23 since turning 65, then PCV13 recommended at least one year after PPSV23 dose  Hepatitis B Vaccine 3 dose series if at intermediate or high risk (ex: diabetes, end stage renal disease, liver disease)   Tetanus (Td) Vaccine - COST NOT COVERED BY MEDICARE PART B Following completion of primary series, a booster dose should be given every 10 years to maintain immunity against tetanus  Td may also be given as tetanus wound prophylaxis  Tdap Vaccine - COST NOT COVERED BY MEDICARE PART B Recommended at least once for all adults  For pregnant patients, recommended with each pregnancy  Shingles Vaccine (Shingrix) - COST NOT COVERED BY MEDICARE PART B  2 shot series recommended in those aged 48 and above     Health Maintenance Due:      Topic Date Due    Hepatitis C Screening  1945    MAMMOGRAM  05/16/2019     Immunizations Due:      Topic Date Due    Influenza Vaccine  07/01/2020     Advance Directives   What are advance directives? Advance directives are legal documents that state your wishes and plans for medical care   These plans are made ahead of time in case you lose your ability to make decisions for yourself  Advance directives can apply to any medical decision, such as the treatments you want, and if you want to donate organs  What are the types of advance directives? There are many types of advance directives, and each state has rules about how to use them  You may choose a combination of any of the following:  · Living will: This is a written record of the treatment you want  You can also choose which treatments you do not want, which to limit, and which to stop at a certain time  This includes surgery, medicine, IV fluid, and tube feedings  · Durable power of  for healthcare [facility]): This is a written record that states who you want to make healthcare choices for you when you are unable to make them for yourself  This person, called a proxy, is usually a family member or a friend  You may choose more than 1 proxy  · Do not resuscitate (DNR) order:  A DNR order is used in case your heart stops beating or you stop breathing  It is a request not to have certain forms of treatment, such as CPR  A DNR order may be included in other types of advance directives  · Medical directive: This covers the care that you want if you are in a coma, near death, or unable to make decisions for yourself  You can list the treatments you want for each condition  Treatment may include pain medicine, surgery, blood transfusions, dialysis, IV or tube feedings, and a ventilator (breathing machine)  · Values history: This document has questions about your views, beliefs, and how you feel and think about life  This information can help others choose the care that you would choose  Why are advance directives important? An advance directive helps you control your care  Although spoken wishes may be used, it is better to have your wishes written down  Spoken wishes can be misunderstood, or not followed  Treatments may be given even if you do not want them   An advance directive may make it easier for your family to make difficult choices about your care  Weight Management   Why it is important to manage your weight:  Being overweight increases your risk of health conditions such as heart disease, high blood pressure, type 2 diabetes, and certain types of cancer  It can also increase your risk for osteoarthritis, sleep apnea, and other respiratory problems  Aim for a slow, steady weight loss  Even a small amount of weight loss can lower your risk of health problems  How to lose weight safely:  A safe and healthy way to lose weight is to eat fewer calories and get regular exercise  You can lose up about 1 pound a week by decreasing the number of calories you eat by 500 calories each day  Healthy meal plan for weight management:  A healthy meal plan includes a variety of foods, contains fewer calories, and helps you stay healthy  A healthy meal plan includes the following:  · Eat whole-grain foods more often  A healthy meal plan should contain fiber  Fiber is the part of grains, fruits, and vegetables that is not broken down by your body  Whole-grain foods are healthy and provide extra fiber in your diet  Some examples of whole-grain foods are whole-wheat breads and pastas, oatmeal, brown rice, and bulgur  · Eat a variety of vegetables every day  Include dark, leafy greens such as spinach, kale, gege greens, and mustard greens  Eat yellow and orange vegetables such as carrots, sweet potatoes, and winter squash  · Eat a variety of fruits every day  Choose fresh or canned fruit (canned in its own juice or light syrup) instead of juice  Fruit juice has very little or no fiber  · Eat low-fat dairy foods  Drink fat-free (skim) milk or 1% milk  Eat fat-free yogurt and low-fat cottage cheese  Try low-fat cheeses such as mozzarella and other reduced-fat cheeses  · Choose meat and other protein foods that are low in fat  Choose beans or other legumes such as split peas or lentils   Choose fish, skinless poultry (chicken or turkey), or lean cuts of red meat (beef or pork)  Before you cook meat or poultry, cut off any visible fat  · Use less fat and oil  Try baking foods instead of frying them  Add less fat, such as margarine, sour cream, regular salad dressing and mayonnaise to foods  Eat fewer high-fat foods  Some examples of high-fat foods include french fries, doughnuts, ice cream, and cakes  · Eat fewer sweets  Limit foods and drinks that are high in sugar  This includes candy, cookies, regular soda, and sweetened drinks  Exercise:  Exercise at least 30 minutes per day on most days of the week  Some examples of exercise include walking, biking, dancing, and swimming  You can also fit in more physical activity by taking the stairs instead of the elevator or parking farther away from stores  Ask your healthcare provider about the best exercise plan for you  © Copyright [employer] 2018 Information is for End User's use only and may not be sold, redistributed or otherwise used for commercial purposes   All illustrations and images included in CareNotes® are the copyrighted property of A [employer] M , [employer]  or [address] [place] [address]

## 2020-09-11 RX ORDER — MIRTAZAPINE 15 MG/1
15 TABLET, FILM COATED ORAL 2 TIMES DAILY
Qty: 90 TABLET | Refills: 3 | Status: CANCELLED | OUTPATIENT
Start: 2020-09-11

## 2020-09-21 ENCOUNTER — APPOINTMENT (OUTPATIENT)
Dept: LAB | Facility: HOSPITAL | Age: 75
End: 2020-09-21
Payer: MEDICARE

## 2020-09-21 DIAGNOSIS — H04.123 DRY EYES: ICD-10-CM

## 2020-09-21 DIAGNOSIS — R68.2 DRY MOUTH: ICD-10-CM

## 2020-09-21 DIAGNOSIS — E78.2 MIXED HYPERLIPIDEMIA: ICD-10-CM

## 2020-09-21 DIAGNOSIS — I10 BENIGN HYPERTENSION: ICD-10-CM

## 2020-09-21 DIAGNOSIS — Z11.59 NEED FOR HEPATITIS C SCREENING TEST: ICD-10-CM

## 2020-09-21 DIAGNOSIS — R73.01 IMPAIRED FASTING GLUCOSE: ICD-10-CM

## 2020-09-21 LAB
ALBUMIN SERPL BCP-MCNC: 3.3 G/DL (ref 3.5–5)
ALP SERPL-CCNC: 71 U/L (ref 46–116)
ALT SERPL W P-5'-P-CCNC: 24 U/L (ref 12–78)
ANION GAP SERPL CALCULATED.3IONS-SCNC: 9 MMOL/L (ref 4–13)
AST SERPL W P-5'-P-CCNC: 16 U/L (ref 5–45)
BASOPHILS # BLD AUTO: 0.03 THOUSANDS/ΜL (ref 0–0.1)
BASOPHILS NFR BLD AUTO: 1 % (ref 0–1)
BILIRUB SERPL-MCNC: 0.4 MG/DL (ref 0.2–1)
BUN SERPL-MCNC: 13 MG/DL (ref 5–25)
CALCIUM ALBUM COR SERPL-MCNC: 9.8 MG/DL (ref 8.3–10.1)
CALCIUM SERPL-MCNC: 9.2 MG/DL (ref 8.3–10.1)
CHLORIDE SERPL-SCNC: 103 MMOL/L (ref 100–108)
CHOLEST SERPL-MCNC: 296 MG/DL (ref 50–200)
CO2 SERPL-SCNC: 28 MMOL/L (ref 21–32)
CREAT SERPL-MCNC: 1 MG/DL (ref 0.6–1.3)
EOSINOPHIL # BLD AUTO: 0.12 THOUSAND/ΜL (ref 0–0.61)
EOSINOPHIL NFR BLD AUTO: 2 % (ref 0–6)
ERYTHROCYTE [DISTWIDTH] IN BLOOD BY AUTOMATED COUNT: 14 % (ref 11.6–15.1)
EST. AVERAGE GLUCOSE BLD GHB EST-MCNC: 131 MG/DL
GFR SERPL CREATININE-BSD FRML MDRD: 56 ML/MIN/1.73SQ M
GLUCOSE P FAST SERPL-MCNC: 128 MG/DL (ref 65–99)
HBA1C MFR BLD: 6.2 %
HCT VFR BLD AUTO: 39.6 % (ref 34.8–46.1)
HCV AB SER QL: NORMAL
HDLC SERPL-MCNC: 92 MG/DL
HGB BLD-MCNC: 12.9 G/DL (ref 11.5–15.4)
IMM GRANULOCYTES # BLD AUTO: 0 THOUSAND/UL (ref 0–0.2)
IMM GRANULOCYTES NFR BLD AUTO: 0 % (ref 0–2)
LDLC SERPL CALC-MCNC: 190 MG/DL (ref 0–100)
LYMPHOCYTES # BLD AUTO: 1.16 THOUSANDS/ΜL (ref 0.6–4.47)
LYMPHOCYTES NFR BLD AUTO: 22 % (ref 14–44)
MCH RBC QN AUTO: 30.4 PG (ref 26.8–34.3)
MCHC RBC AUTO-ENTMCNC: 32.6 G/DL (ref 31.4–37.4)
MCV RBC AUTO: 93 FL (ref 82–98)
MONOCYTES # BLD AUTO: 0.36 THOUSAND/ΜL (ref 0.17–1.22)
MONOCYTES NFR BLD AUTO: 7 % (ref 4–12)
NEUTROPHILS # BLD AUTO: 3.66 THOUSANDS/ΜL (ref 1.85–7.62)
NEUTS SEG NFR BLD AUTO: 68 % (ref 43–75)
NONHDLC SERPL-MCNC: 204 MG/DL
NRBC BLD AUTO-RTO: 0 /100 WBCS
PLATELET # BLD AUTO: 315 THOUSANDS/UL (ref 149–390)
PMV BLD AUTO: 9.5 FL (ref 8.9–12.7)
POTASSIUM SERPL-SCNC: 3.8 MMOL/L (ref 3.5–5.3)
PROT SERPL-MCNC: 8 G/DL (ref 6.4–8.2)
RBC # BLD AUTO: 4.24 MILLION/UL (ref 3.81–5.12)
SODIUM SERPL-SCNC: 140 MMOL/L (ref 136–145)
TRIGL SERPL-MCNC: 69 MG/DL
WBC # BLD AUTO: 5.33 THOUSAND/UL (ref 4.31–10.16)

## 2020-09-21 PROCEDURE — 86235 NUCLEAR ANTIGEN ANTIBODY: CPT

## 2020-09-21 PROCEDURE — 86803 HEPATITIS C AB TEST: CPT

## 2020-09-21 PROCEDURE — 83036 HEMOGLOBIN GLYCOSYLATED A1C: CPT

## 2020-09-21 PROCEDURE — 36415 COLL VENOUS BLD VENIPUNCTURE: CPT

## 2020-09-21 PROCEDURE — 86038 ANTINUCLEAR ANTIBODIES: CPT

## 2020-09-21 PROCEDURE — 80053 COMPREHEN METABOLIC PANEL: CPT

## 2020-09-21 PROCEDURE — 85025 COMPLETE CBC W/AUTO DIFF WBC: CPT

## 2020-09-21 PROCEDURE — 86039 ANTINUCLEAR ANTIBODIES (ANA): CPT

## 2020-09-21 PROCEDURE — 80061 LIPID PANEL: CPT

## 2020-09-22 LAB
ENA SS-A AB SER-ACNC: >8 AI (ref 0–0.9)
ENA SS-B AB SER-ACNC: 0.2 AI (ref 0–0.9)

## 2020-09-23 LAB
ANA HOMOGEN SER QL IF: NORMAL
ANA HOMOGEN TITR SER: NORMAL {TITER}
RYE IGE QN: POSITIVE

## 2020-09-24 DIAGNOSIS — M35.00 SJOGREN'S SYNDROME, WITH UNSPECIFIED ORGAN INVOLVEMENT (HCC): Primary | ICD-10-CM

## 2020-09-24 DIAGNOSIS — I10 BENIGN HYPERTENSION: Primary | ICD-10-CM

## 2020-09-24 RX ORDER — OLMESARTAN MEDOXOMIL 40 MG/1
40 TABLET ORAL DAILY
Qty: 90 TABLET | Refills: 1 | Status: SHIPPED | OUTPATIENT
Start: 2020-09-24 | End: 2021-03-27

## 2020-10-05 ENCOUNTER — HOSPITAL ENCOUNTER (OUTPATIENT)
Dept: CT IMAGING | Facility: HOSPITAL | Age: 75
Discharge: HOME | End: 2020-10-05
Attending: UROLOGY
Payer: MEDICARE

## 2020-10-05 DIAGNOSIS — N20.0 NEPHROLITHIASIS: ICD-10-CM

## 2020-10-05 PROCEDURE — G1004 CDSM NDSC: HCPCS

## 2020-10-05 PROCEDURE — 74176 CT ABD & PELVIS W/O CONTRAST: CPT

## 2020-10-06 ENCOUNTER — HOSPITAL ENCOUNTER (OUTPATIENT)
Dept: MAMMOGRAPHY | Facility: CLINIC | Age: 75
Discharge: HOME | End: 2020-10-06
Payer: MEDICARE

## 2020-10-06 DIAGNOSIS — Z12.39 SCREENING FOR BREAST CANCER: ICD-10-CM

## 2020-10-06 PROCEDURE — 77067 SCR MAMMO BI INCL CAD: CPT

## 2020-10-06 PROCEDURE — 77063 BREAST TOMOSYNTHESIS BI: CPT

## 2020-10-27 ENCOUNTER — OFFICE VISIT (OUTPATIENT)
Dept: UROLOGY | Facility: CLINIC | Age: 75
End: 2020-10-27
Payer: MEDICARE

## 2020-10-27 VITALS — TEMPERATURE: 98.1 F | HEART RATE: 97 BPM | WEIGHT: 158.6 LBS | HEIGHT: 63 IN | BODY MASS INDEX: 28.1 KG/M2

## 2020-10-27 DIAGNOSIS — N20.0 NEPHROLITHIASIS: Primary | ICD-10-CM

## 2020-10-27 DIAGNOSIS — I71.4 ABDOMINAL AORTIC ANEURYSM (AAA) WITHOUT RUPTURE (HCC): Primary | ICD-10-CM

## 2020-10-27 LAB
SL AMB  POCT GLUCOSE, UA: NORMAL
SL AMB LEUKOCYTE ESTERASE,UA: NORMAL
SL AMB POCT BILIRUBIN,UA: NORMAL
SL AMB POCT BLOOD,UA: NORMAL
SL AMB POCT CLARITY,UA: CLEAR
SL AMB POCT COLOR,UA: YELLOW
SL AMB POCT KETONES,UA: NORMAL
SL AMB POCT NITRITE,UA: NORMAL
SL AMB POCT PH,UA: 6
SL AMB POCT SPECIFIC GRAVITY,UA: 1
SL AMB POCT URINE PROTEIN: NORMAL
SL AMB POCT UROBILINOGEN: NORMAL

## 2020-10-27 PROCEDURE — 87086 URINE CULTURE/COLONY COUNT: CPT | Performed by: UROLOGY

## 2020-10-27 PROCEDURE — 81002 URINALYSIS NONAUTO W/O SCOPE: CPT | Performed by: UROLOGY

## 2020-10-27 PROCEDURE — 99214 OFFICE O/P EST MOD 30 MIN: CPT | Performed by: UROLOGY

## 2020-10-28 ENCOUNTER — PREP FOR PROCEDURE (OUTPATIENT)
Dept: INTERVENTIONAL RADIOLOGY/VASCULAR | Facility: CLINIC | Age: 75
End: 2020-10-28

## 2020-10-28 ENCOUNTER — TELEPHONE (OUTPATIENT)
Dept: UROLOGY | Facility: MEDICAL CENTER | Age: 75
End: 2020-10-28

## 2020-10-28 DIAGNOSIS — N20.0 RENAL STONE: Primary | ICD-10-CM

## 2020-10-28 LAB — BACTERIA UR CULT: NORMAL

## 2020-10-30 ENCOUNTER — TELEPHONE (OUTPATIENT)
Dept: INTERNAL MEDICINE CLINIC | Facility: CLINIC | Age: 75
End: 2020-10-30

## 2020-10-30 DIAGNOSIS — I71.4 ABDOMINAL AORTIC ANEURYSM (AAA) WITHOUT RUPTURE (HCC): Primary | ICD-10-CM

## 2020-10-30 DIAGNOSIS — Z01.818 PREOP EXAMINATION: ICD-10-CM

## 2020-11-02 ENCOUNTER — TELEPHONE (OUTPATIENT)
Dept: VASCULAR SURGERY | Facility: CLINIC | Age: 75
End: 2020-11-02

## 2020-11-03 ENCOUNTER — CONSULT (OUTPATIENT)
Dept: VASCULAR SURGERY | Facility: CLINIC | Age: 75
End: 2020-11-03
Payer: MEDICARE

## 2020-11-03 VITALS
TEMPERATURE: 99.4 F | HEIGHT: 63 IN | HEART RATE: 87 BPM | DIASTOLIC BLOOD PRESSURE: 92 MMHG | WEIGHT: 163 LBS | RESPIRATION RATE: 20 BRPM | SYSTOLIC BLOOD PRESSURE: 154 MMHG | BODY MASS INDEX: 28.88 KG/M2

## 2020-11-03 DIAGNOSIS — I71.4 ABDOMINAL AORTIC ANEURYSM (AAA) 3.0 CM TO 5.0 CM IN DIAMETER IN FEMALE (HCC): Primary | ICD-10-CM

## 2020-11-03 DIAGNOSIS — N18.31 STAGE 3A CHRONIC KIDNEY DISEASE (HCC): ICD-10-CM

## 2020-11-03 DIAGNOSIS — I71.4 ABDOMINAL AORTIC ANEURYSM (AAA) WITHOUT RUPTURE (HCC): ICD-10-CM

## 2020-11-03 PROCEDURE — 99204 OFFICE O/P NEW MOD 45 MIN: CPT | Performed by: SURGERY

## 2020-11-10 DIAGNOSIS — K57.92 DIVERTICULITIS: Primary | ICD-10-CM

## 2020-11-10 RX ORDER — METRONIDAZOLE 500 MG/1
500 TABLET ORAL EVERY 8 HOURS SCHEDULED
COMMUNITY
End: 2020-11-10 | Stop reason: SDUPTHER

## 2020-11-10 RX ORDER — METRONIDAZOLE 500 MG/1
500 TABLET ORAL 3 TIMES DAILY
Qty: 30 TABLET | Refills: 0 | Status: SHIPPED | OUTPATIENT
Start: 2020-11-10 | End: 2020-11-17

## 2020-11-10 RX ORDER — CIPROFLOXACIN 500 MG/1
500 TABLET, FILM COATED ORAL EVERY 12 HOURS SCHEDULED
Qty: 10 TABLET | Refills: 0 | Status: SHIPPED | OUTPATIENT
Start: 2020-11-10 | End: 2020-11-15

## 2020-11-10 RX ORDER — CIPROFLOXACIN 500 MG/1
500 TABLET, FILM COATED ORAL EVERY 12 HOURS SCHEDULED
COMMUNITY
End: 2020-11-10 | Stop reason: SDUPTHER

## 2020-11-12 ENCOUNTER — HOSPITAL ENCOUNTER (OUTPATIENT)
Dept: CT IMAGING | Facility: HOSPITAL | Age: 75
Discharge: HOME | End: 2020-11-12
Attending: SURGERY
Payer: MEDICARE

## 2020-11-12 DIAGNOSIS — I71.4 ABDOMINAL AORTIC ANEURYSM (AAA) 3.0 CM TO 5.0 CM IN DIAMETER IN FEMALE (HCC): ICD-10-CM

## 2020-11-12 DIAGNOSIS — I71.4 ABDOMINAL AORTIC ANEURYSM (AAA) WITHOUT RUPTURE (HCC): ICD-10-CM

## 2020-11-12 PROCEDURE — 71250 CT THORAX DX C-: CPT

## 2020-11-12 PROCEDURE — G1004 CDSM NDSC: HCPCS

## 2020-11-13 ENCOUNTER — TELEPHONE (OUTPATIENT)
Dept: INTERNAL MEDICINE CLINIC | Facility: CLINIC | Age: 75
End: 2020-11-13

## 2020-11-18 ENCOUNTER — ANESTHESIA EVENT (OUTPATIENT)
Dept: PERIOP | Facility: HOSPITAL | Age: 75
End: 2020-11-18
Payer: MEDICARE

## 2020-11-19 ENCOUNTER — OFFICE VISIT (OUTPATIENT)
Dept: INTERNAL MEDICINE CLINIC | Facility: CLINIC | Age: 75
End: 2020-11-19
Payer: MEDICARE

## 2020-11-19 VITALS
WEIGHT: 157 LBS | BODY MASS INDEX: 27.82 KG/M2 | OXYGEN SATURATION: 98 % | HEIGHT: 63 IN | HEART RATE: 96 BPM | TEMPERATURE: 98.6 F | RESPIRATION RATE: 16 BRPM | SYSTOLIC BLOOD PRESSURE: 130 MMHG | DIASTOLIC BLOOD PRESSURE: 78 MMHG

## 2020-11-19 DIAGNOSIS — I71.4 ABDOMINAL AORTIC ANEURYSM (AAA) 3.0 CM TO 5.0 CM IN DIAMETER IN FEMALE (HCC): ICD-10-CM

## 2020-11-19 DIAGNOSIS — Z01.818 PRE-OP EXAMINATION: Primary | ICD-10-CM

## 2020-11-19 DIAGNOSIS — I10 BENIGN HYPERTENSION: ICD-10-CM

## 2020-11-19 DIAGNOSIS — N20.0 NEPHROLITHIASIS: ICD-10-CM

## 2020-11-19 DIAGNOSIS — Z23 NEED FOR INFLUENZA VACCINATION: ICD-10-CM

## 2020-11-19 PROBLEM — M35.01 SJOGREN'S SYNDROME WITH KERATOCONJUNCTIVITIS SICCA (HCC): Status: ACTIVE | Noted: 2020-11-19

## 2020-11-19 PROCEDURE — 90662 IIV NO PRSV INCREASED AG IM: CPT | Performed by: PHYSICIAN ASSISTANT

## 2020-11-19 PROCEDURE — G0008 ADMIN INFLUENZA VIRUS VAC: HCPCS | Performed by: PHYSICIAN ASSISTANT

## 2020-11-19 PROCEDURE — 99214 OFFICE O/P EST MOD 30 MIN: CPT | Performed by: PHYSICIAN ASSISTANT

## 2020-11-20 ENCOUNTER — LAB REQUISITION (OUTPATIENT)
Dept: LAB | Facility: HOSPITAL | Age: 75
End: 2020-11-20
Payer: MEDICARE

## 2020-11-20 ENCOUNTER — APPOINTMENT (OUTPATIENT)
Dept: LAB | Facility: HOSPITAL | Age: 75
End: 2020-11-20
Attending: UROLOGY
Payer: MEDICARE

## 2020-11-20 ENCOUNTER — OFFICE VISIT (OUTPATIENT)
Dept: LAB | Facility: HOSPITAL | Age: 75
End: 2020-11-20
Attending: UROLOGY
Payer: MEDICARE

## 2020-11-20 ENCOUNTER — TRANSCRIBE ORDERS (OUTPATIENT)
Dept: ADMINISTRATIVE | Facility: HOSPITAL | Age: 75
End: 2020-11-20

## 2020-11-20 DIAGNOSIS — N20.0 NEPHROLITHIASIS: ICD-10-CM

## 2020-11-20 DIAGNOSIS — N20.0 NEPHROLITHIASIS: Primary | ICD-10-CM

## 2020-11-20 DIAGNOSIS — Z79.01 ANTICOAGULATED: ICD-10-CM

## 2020-11-20 DIAGNOSIS — N20.0 CALCULUS OF KIDNEY: ICD-10-CM

## 2020-11-20 LAB
ABO GROUP BLD: NORMAL
ANION GAP SERPL CALCULATED.3IONS-SCNC: 9 MMOL/L (ref 4–13)
APTT PPP: 25 SECONDS (ref 23–37)
BASOPHILS # BLD AUTO: 0.04 THOUSANDS/ΜL (ref 0–0.1)
BASOPHILS NFR BLD AUTO: 1 % (ref 0–1)
BLD GP AB SCN SERPL QL: NEGATIVE
BUN SERPL-MCNC: 20 MG/DL (ref 5–25)
CALCIUM SERPL-MCNC: 9.1 MG/DL (ref 8.3–10.1)
CHLORIDE SERPL-SCNC: 105 MMOL/L (ref 100–108)
CO2 SERPL-SCNC: 27 MMOL/L (ref 21–32)
CREAT SERPL-MCNC: 0.96 MG/DL (ref 0.6–1.3)
EOSINOPHIL # BLD AUTO: 0.1 THOUSAND/ΜL (ref 0–0.61)
EOSINOPHIL NFR BLD AUTO: 2 % (ref 0–6)
ERYTHROCYTE [DISTWIDTH] IN BLOOD BY AUTOMATED COUNT: 13.6 % (ref 11.6–15.1)
GFR SERPL CREATININE-BSD FRML MDRD: 58 ML/MIN/1.73SQ M
GLUCOSE P FAST SERPL-MCNC: 119 MG/DL (ref 65–99)
HCT VFR BLD AUTO: 38.7 % (ref 34.8–46.1)
HGB BLD-MCNC: 12.5 G/DL (ref 11.5–15.4)
IMM GRANULOCYTES # BLD AUTO: 0.02 THOUSAND/UL (ref 0–0.2)
IMM GRANULOCYTES NFR BLD AUTO: 0 % (ref 0–2)
INR PPP: 1.05 (ref 0.84–1.19)
LYMPHOCYTES # BLD AUTO: 0.97 THOUSANDS/ΜL (ref 0.6–4.47)
LYMPHOCYTES NFR BLD AUTO: 17 % (ref 14–44)
MCH RBC QN AUTO: 30.9 PG (ref 26.8–34.3)
MCHC RBC AUTO-ENTMCNC: 32.3 G/DL (ref 31.4–37.4)
MCV RBC AUTO: 96 FL (ref 82–98)
MONOCYTES # BLD AUTO: 0.36 THOUSAND/ΜL (ref 0.17–1.22)
MONOCYTES NFR BLD AUTO: 6 % (ref 4–12)
NEUTROPHILS # BLD AUTO: 4.16 THOUSANDS/ΜL (ref 1.85–7.62)
NEUTS SEG NFR BLD AUTO: 74 % (ref 43–75)
NRBC BLD AUTO-RTO: 0 /100 WBCS
PLATELET # BLD AUTO: 325 THOUSANDS/UL (ref 149–390)
PMV BLD AUTO: 9.4 FL (ref 8.9–12.7)
POTASSIUM SERPL-SCNC: 4.5 MMOL/L (ref 3.5–5.3)
PROTHROMBIN TIME: 13.2 SECONDS (ref 11.6–14.5)
RBC # BLD AUTO: 4.05 MILLION/UL (ref 3.81–5.12)
RH BLD: POSITIVE
SODIUM SERPL-SCNC: 141 MMOL/L (ref 136–145)
SPECIMEN EXPIRATION DATE: NORMAL
WBC # BLD AUTO: 5.65 THOUSAND/UL (ref 4.31–10.16)

## 2020-11-20 PROCEDURE — U0003 INFECTIOUS AGENT DETECTION BY NUCLEIC ACID (DNA OR RNA); SEVERE ACUTE RESPIRATORY SYNDROME CORONAVIRUS 2 (SARS-COV-2) (CORONAVIRUS DISEASE [COVID-19]), AMPLIFIED PROBE TECHNIQUE, MAKING USE OF HIGH THROUGHPUT TECHNOLOGIES AS DESCRIBED BY CMS-2020-01-R: HCPCS | Performed by: UROLOGY

## 2020-11-20 PROCEDURE — 85730 THROMBOPLASTIN TIME PARTIAL: CPT

## 2020-11-20 PROCEDURE — 36415 COLL VENOUS BLD VENIPUNCTURE: CPT

## 2020-11-20 PROCEDURE — 80048 BASIC METABOLIC PNL TOTAL CA: CPT

## 2020-11-20 PROCEDURE — 86850 RBC ANTIBODY SCREEN: CPT

## 2020-11-20 PROCEDURE — 86900 BLOOD TYPING SEROLOGIC ABO: CPT

## 2020-11-20 PROCEDURE — 86901 BLOOD TYPING SEROLOGIC RH(D): CPT

## 2020-11-20 PROCEDURE — 85025 COMPLETE CBC W/AUTO DIFF WBC: CPT

## 2020-11-20 PROCEDURE — 86920 COMPATIBILITY TEST SPIN: CPT

## 2020-11-20 PROCEDURE — 85610 PROTHROMBIN TIME: CPT

## 2020-11-20 PROCEDURE — 93005 ELECTROCARDIOGRAM TRACING: CPT

## 2020-11-21 LAB
ATRIAL RATE: 91 BPM
P AXIS: 71 DEGREES
PR INTERVAL: 144 MS
QRS AXIS: 66 DEGREES
QRSD INTERVAL: 116 MS
QT INTERVAL: 384 MS
QTC INTERVAL: 472 MS
T WAVE AXIS: 14 DEGREES
VENTRICULAR RATE: 91 BPM

## 2020-11-21 PROCEDURE — 93010 ELECTROCARDIOGRAM REPORT: CPT | Performed by: INTERNAL MEDICINE

## 2020-11-22 LAB — SARS-COV-2 RNA SPEC QL NAA+PROBE: NOT DETECTED

## 2020-11-25 ENCOUNTER — TELEPHONE (OUTPATIENT)
Dept: INTERVENTIONAL RADIOLOGY/VASCULAR | Facility: HOSPITAL | Age: 75
End: 2020-11-25

## 2020-12-01 ENCOUNTER — HOSPITAL ENCOUNTER (OUTPATIENT)
Dept: INTERVENTIONAL RADIOLOGY/VASCULAR | Facility: HOSPITAL | Age: 75
Discharge: HOME | End: 2020-12-01
Attending: RADIOLOGY
Payer: MEDICARE

## 2020-12-01 ENCOUNTER — ANESTHESIA EVENT (OUTPATIENT)
Dept: INTERVENTIONAL RADIOLOGY/VASCULAR | Facility: HOSPITAL | Age: 75
End: 2020-12-01

## 2020-12-01 ENCOUNTER — ANESTHESIA (OUTPATIENT)
Dept: INTERVENTIONAL RADIOLOGY/VASCULAR | Facility: HOSPITAL | Age: 75
End: 2020-12-01

## 2020-12-01 ENCOUNTER — TELEPHONE (OUTPATIENT)
Dept: INTERVENTIONAL RADIOLOGY/VASCULAR | Facility: HOSPITAL | Age: 75
End: 2020-12-01

## 2020-12-01 VITALS
TEMPERATURE: 97.8 F | OXYGEN SATURATION: 99 % | HEIGHT: 63 IN | WEIGHT: 156.09 LBS | SYSTOLIC BLOOD PRESSURE: 162 MMHG | RESPIRATION RATE: 16 BRPM | BODY MASS INDEX: 27.66 KG/M2 | DIASTOLIC BLOOD PRESSURE: 81 MMHG | HEART RATE: 66 BPM

## 2020-12-01 VITALS — HEART RATE: 75 BPM

## 2020-12-01 DIAGNOSIS — N20.0 RENAL STONE: ICD-10-CM

## 2020-12-01 DIAGNOSIS — N20.0 NEPHROLITHIASIS: Primary | ICD-10-CM

## 2020-12-01 PROCEDURE — C1769 GUIDE WIRE: HCPCS

## 2020-12-01 PROCEDURE — C1894 INTRO/SHEATH, NON-LASER: HCPCS

## 2020-12-01 PROCEDURE — C1887 CATHETER, GUIDING: HCPCS

## 2020-12-01 PROCEDURE — 50432 PLMT NEPHROSTOMY CATHETER: CPT

## 2020-12-01 PROCEDURE — C1729 CATH, DRAINAGE: HCPCS

## 2020-12-01 PROCEDURE — 50432 PLMT NEPHROSTOMY CATHETER: CPT | Performed by: STUDENT IN AN ORGANIZED HEALTH CARE EDUCATION/TRAINING PROGRAM

## 2020-12-01 RX ORDER — SODIUM CHLORIDE, SODIUM LACTATE, POTASSIUM CHLORIDE, CALCIUM CHLORIDE 600; 310; 30; 20 MG/100ML; MG/100ML; MG/100ML; MG/100ML
50 INJECTION, SOLUTION INTRAVENOUS CONTINUOUS
Status: DISCONTINUED | OUTPATIENT
Start: 2020-12-01 | End: 2020-12-05 | Stop reason: HOSPADM

## 2020-12-01 RX ORDER — OXYCODONE HYDROCHLORIDE 5 MG/1
5 TABLET ORAL ONCE AS NEEDED
Status: DISCONTINUED | OUTPATIENT
Start: 2020-12-01 | End: 2020-12-05 | Stop reason: HOSPADM

## 2020-12-01 RX ORDER — LIDOCAINE HYDROCHLORIDE 10 MG/ML
INJECTION, SOLUTION EPIDURAL; INFILTRATION; INTRACAUDAL; PERINEURAL AS NEEDED
Status: DISCONTINUED | OUTPATIENT
Start: 2020-12-01 | End: 2020-12-01

## 2020-12-01 RX ORDER — SODIUM CHLORIDE 9 MG/ML
10 INJECTION INTRAVENOUS EVERY 12 HOURS SCHEDULED
Qty: 30 SYRINGE | Refills: 3 | Status: SHIPPED | OUTPATIENT
Start: 2020-12-01 | End: 2020-12-04 | Stop reason: SDUPTHER

## 2020-12-01 RX ORDER — DEXMEDETOMIDINE HYDROCHLORIDE 100 UG/ML
INJECTION, SOLUTION INTRAVENOUS AS NEEDED
Status: DISCONTINUED | OUTPATIENT
Start: 2020-12-01 | End: 2020-12-01

## 2020-12-01 RX ORDER — SODIUM CHLORIDE 9 MG/ML
INJECTION, SOLUTION INTRAVENOUS CONTINUOUS PRN
Status: DISCONTINUED | OUTPATIENT
Start: 2020-12-01 | End: 2020-12-01

## 2020-12-01 RX ORDER — FENTANYL CITRATE/PF 50 MCG/ML
50 SYRINGE (ML) INJECTION
Status: DISCONTINUED | OUTPATIENT
Start: 2020-12-01 | End: 2020-12-05 | Stop reason: HOSPADM

## 2020-12-01 RX ORDER — FENTANYL CITRATE 50 UG/ML
INJECTION, SOLUTION INTRAMUSCULAR; INTRAVENOUS AS NEEDED
Status: DISCONTINUED | OUTPATIENT
Start: 2020-12-01 | End: 2020-12-01

## 2020-12-01 RX ORDER — LIDOCAINE WITH 8.4% SOD BICARB 0.9%(10ML)
SYRINGE (ML) INJECTION CODE/TRAUMA/SEDATION MEDICATION
Status: COMPLETED | OUTPATIENT
Start: 2020-12-01 | End: 2020-12-01

## 2020-12-01 RX ORDER — MIDAZOLAM HYDROCHLORIDE 2 MG/2ML
INJECTION, SOLUTION INTRAMUSCULAR; INTRAVENOUS AS NEEDED
Status: DISCONTINUED | OUTPATIENT
Start: 2020-12-01 | End: 2020-12-01

## 2020-12-01 RX ORDER — CEFAZOLIN SODIUM 1 G/50ML
1000 SOLUTION INTRAVENOUS ONCE
Status: COMPLETED | OUTPATIENT
Start: 2020-12-01 | End: 2020-12-01

## 2020-12-01 RX ORDER — ONDANSETRON 2 MG/ML
4 INJECTION INTRAMUSCULAR; INTRAVENOUS ONCE AS NEEDED
Status: DISCONTINUED | OUTPATIENT
Start: 2020-12-01 | End: 2020-12-05 | Stop reason: HOSPADM

## 2020-12-01 RX ORDER — ONDANSETRON 2 MG/ML
INJECTION INTRAMUSCULAR; INTRAVENOUS AS NEEDED
Status: DISCONTINUED | OUTPATIENT
Start: 2020-12-01 | End: 2020-12-01

## 2020-12-01 RX ADMIN — MIDAZOLAM HYDROCHLORIDE 0.5 MG: 1 INJECTION, SOLUTION INTRAMUSCULAR; INTRAVENOUS at 10:27

## 2020-12-01 RX ADMIN — DEXMEDETOMIDINE HCL 4 MCG: 100 INJECTION INTRAVENOUS at 10:44

## 2020-12-01 RX ADMIN — FENTANYL CITRATE 25 MCG: 50 INJECTION, SOLUTION INTRAMUSCULAR; INTRAVENOUS at 10:03

## 2020-12-01 RX ADMIN — DEXMEDETOMIDINE HCL 8 MCG: 100 INJECTION INTRAVENOUS at 10:16

## 2020-12-01 RX ADMIN — FENTANYL CITRATE 25 MCG: 50 INJECTION, SOLUTION INTRAMUSCULAR; INTRAVENOUS at 10:34

## 2020-12-01 RX ADMIN — FENTANYL CITRATE 25 MCG: 50 INJECTION, SOLUTION INTRAMUSCULAR; INTRAVENOUS at 09:10

## 2020-12-01 RX ADMIN — IOHEXOL 30 ML: 350 INJECTION, SOLUTION INTRAVENOUS at 09:52

## 2020-12-01 RX ADMIN — Medication 8 ML: at 09:45

## 2020-12-01 RX ADMIN — CEFAZOLIN SODIUM 1000 MG: 1 SOLUTION INTRAVENOUS at 08:10

## 2020-12-01 RX ADMIN — FENTANYL CITRATE 25 MCG: 50 INJECTION, SOLUTION INTRAMUSCULAR; INTRAVENOUS at 10:22

## 2020-12-01 RX ADMIN — LIDOCAINE HYDROCHLORIDE 50 MG: 10 INJECTION, SOLUTION EPIDURAL; INFILTRATION; INTRACAUDAL; PERINEURAL at 09:17

## 2020-12-01 RX ADMIN — MIDAZOLAM HYDROCHLORIDE 0.5 MG: 1 INJECTION, SOLUTION INTRAMUSCULAR; INTRAVENOUS at 09:43

## 2020-12-01 RX ADMIN — FENTANYL CITRATE 25 MCG: 50 INJECTION, SOLUTION INTRAMUSCULAR; INTRAVENOUS at 09:37

## 2020-12-01 RX ADMIN — Medication 8 ML: at 10:36

## 2020-12-01 RX ADMIN — ONDANSETRON 4 MG: 2 INJECTION INTRAMUSCULAR; INTRAVENOUS at 11:00

## 2020-12-01 RX ADMIN — MIDAZOLAM HYDROCHLORIDE 1 MG: 1 INJECTION, SOLUTION INTRAMUSCULAR; INTRAVENOUS at 09:10

## 2020-12-01 RX ADMIN — FENTANYL CITRATE 25 MCG: 50 INJECTION, SOLUTION INTRAMUSCULAR; INTRAVENOUS at 09:22

## 2020-12-01 RX ADMIN — DEXMEDETOMIDINE HCL 4 MCG: 100 INJECTION INTRAVENOUS at 10:29

## 2020-12-01 RX ADMIN — SODIUM CHLORIDE: 0.9 INJECTION, SOLUTION INTRAVENOUS at 08:11

## 2020-12-01 RX ADMIN — Medication 9 ML: at 09:22

## 2020-12-01 RX ADMIN — MIDAZOLAM HYDROCHLORIDE 1 MG: 1 INJECTION, SOLUTION INTRAMUSCULAR; INTRAVENOUS at 09:05

## 2020-12-01 RX ADMIN — IOHEXOL 50 ML: 350 INJECTION, SOLUTION INTRAVENOUS at 11:02

## 2020-12-01 RX ADMIN — FENTANYL CITRATE 25 MCG: 50 INJECTION, SOLUTION INTRAMUSCULAR; INTRAVENOUS at 10:45

## 2020-12-01 RX ADMIN — DEXMEDETOMIDINE HCL 4 MCG: 100 INJECTION INTRAVENOUS at 10:33

## 2020-12-01 RX ADMIN — FENTANYL CITRATE 25 MCG: 50 INJECTION, SOLUTION INTRAMUSCULAR; INTRAVENOUS at 09:43

## 2020-12-01 RX ADMIN — FENTANYL CITRATE 25 MCG: 50 INJECTION, SOLUTION INTRAMUSCULAR; INTRAVENOUS at 10:40

## 2020-12-01 RX ADMIN — SODIUM CHLORIDE: 0.9 INJECTION, SOLUTION INTRAVENOUS at 10:54

## 2020-12-01 RX ADMIN — FENTANYL CITRATE 25 MCG: 50 INJECTION, SOLUTION INTRAMUSCULAR; INTRAVENOUS at 09:05

## 2020-12-02 DIAGNOSIS — N20.0 NEPHROLITHIASIS: ICD-10-CM

## 2020-12-02 PROCEDURE — U0003 INFECTIOUS AGENT DETECTION BY NUCLEIC ACID (DNA OR RNA); SEVERE ACUTE RESPIRATORY SYNDROME CORONAVIRUS 2 (SARS-COV-2) (CORONAVIRUS DISEASE [COVID-19]), AMPLIFIED PROBE TECHNIQUE, MAKING USE OF HIGH THROUGHPUT TECHNOLOGIES AS DESCRIBED BY CMS-2020-01-R: HCPCS | Performed by: UROLOGY

## 2020-12-03 LAB — SARS-COV-2 RNA SPEC QL NAA+PROBE: NOT DETECTED

## 2020-12-04 DIAGNOSIS — N20.0 NEPHROLITHIASIS: ICD-10-CM

## 2020-12-04 RX ORDER — SODIUM CHLORIDE 9 MG/ML
10 INJECTION INTRAVENOUS EVERY 12 HOURS SCHEDULED
Qty: 30 SYRINGE | Refills: 3 | OUTPATIENT
Start: 2020-12-04 | End: 2021-02-04 | Stop reason: HOSPADM

## 2020-12-07 ENCOUNTER — HOSPITAL ENCOUNTER (OUTPATIENT)
Facility: HOSPITAL | Age: 75
Setting detail: OUTPATIENT SURGERY
Discharge: HOME | End: 2020-12-08
Attending: UROLOGY | Admitting: UROLOGY
Payer: MEDICARE

## 2020-12-07 ENCOUNTER — TELEPHONE (OUTPATIENT)
Dept: UROLOGY | Facility: CLINIC | Age: 75
End: 2020-12-07

## 2020-12-07 ENCOUNTER — APPOINTMENT (OUTPATIENT)
Dept: RADIOLOGY | Facility: HOSPITAL | Age: 75
End: 2020-12-07
Payer: MEDICARE

## 2020-12-07 ENCOUNTER — ANESTHESIA (OUTPATIENT)
Dept: PERIOP | Facility: HOSPITAL | Age: 75
End: 2020-12-07
Payer: MEDICARE

## 2020-12-07 VITALS — HEART RATE: 71 BPM

## 2020-12-07 DIAGNOSIS — N18.31 STAGE 3A CHRONIC KIDNEY DISEASE (HCC): ICD-10-CM

## 2020-12-07 DIAGNOSIS — N20.0 NEPHROLITHIASIS: Primary | ICD-10-CM

## 2020-12-07 LAB
ANION GAP SERPL CALCULATED.3IONS-SCNC: 8 MMOL/L (ref 4–13)
BUN SERPL-MCNC: 11 MG/DL (ref 5–25)
CALCIUM SERPL-MCNC: 8.6 MG/DL (ref 8.3–10.1)
CHLORIDE SERPL-SCNC: 106 MMOL/L (ref 100–108)
CO2 SERPL-SCNC: 25 MMOL/L (ref 21–32)
CREAT SERPL-MCNC: 1.07 MG/DL (ref 0.6–1.3)
ERYTHROCYTE [DISTWIDTH] IN BLOOD BY AUTOMATED COUNT: 13.7 % (ref 11.6–15.1)
GFR SERPL CREATININE-BSD FRML MDRD: 51 ML/MIN/1.73SQ M
GLUCOSE P FAST SERPL-MCNC: 149 MG/DL (ref 65–99)
GLUCOSE SERPL-MCNC: 149 MG/DL (ref 65–140)
HCT VFR BLD AUTO: 32.1 % (ref 34.8–46.1)
HGB BLD-MCNC: 10.3 G/DL (ref 11.5–15.4)
MCH RBC QN AUTO: 30.9 PG (ref 26.8–34.3)
MCHC RBC AUTO-ENTMCNC: 32.1 G/DL (ref 31.4–37.4)
MCV RBC AUTO: 96 FL (ref 82–98)
PLATELET # BLD AUTO: 245 THOUSANDS/UL (ref 149–390)
PMV BLD AUTO: 9.6 FL (ref 8.9–12.7)
POTASSIUM SERPL-SCNC: 3.9 MMOL/L (ref 3.5–5.3)
RBC # BLD AUTO: 3.33 MILLION/UL (ref 3.81–5.12)
SODIUM SERPL-SCNC: 139 MMOL/L (ref 136–145)
WBC # BLD AUTO: 6.37 THOUSAND/UL (ref 4.31–10.16)

## 2020-12-07 PROCEDURE — 99215 OFFICE O/P EST HI 40 MIN: CPT | Performed by: INTERNAL MEDICINE

## 2020-12-07 PROCEDURE — C2617 STENT, NON-COR, TEM W/O DEL: HCPCS | Performed by: UROLOGY

## 2020-12-07 PROCEDURE — C1726 CATH, BAL DIL, NON-VASCULAR: HCPCS | Performed by: UROLOGY

## 2020-12-07 PROCEDURE — 80048 BASIC METABOLIC PNL TOTAL CA: CPT | Performed by: UROLOGY

## 2020-12-07 PROCEDURE — 85027 COMPLETE CBC AUTOMATED: CPT | Performed by: UROLOGY

## 2020-12-07 PROCEDURE — C1769 GUIDE WIRE: HCPCS | Performed by: UROLOGY

## 2020-12-07 PROCEDURE — NC001 PR NO CHARGE: Performed by: UROLOGY

## 2020-12-07 PROCEDURE — 50081 PERQ NL/PL LITHOTRP CPLX>2CM: CPT | Performed by: UROLOGY

## 2020-12-07 PROCEDURE — 74420 UROGRAPHY RTRGR +-KUB: CPT

## 2020-12-07 PROCEDURE — C1758 CATHETER, URETERAL: HCPCS | Performed by: UROLOGY

## 2020-12-07 DEVICE — STENT URETERAL 6 FR 28CM INLAY OPTIMA: Type: IMPLANTABLE DEVICE | Site: URETER | Status: FUNCTIONAL

## 2020-12-07 RX ORDER — TAMSULOSIN HYDROCHLORIDE 0.4 MG/1
0.4 CAPSULE ORAL
Status: DISCONTINUED | OUTPATIENT
Start: 2020-12-07 | End: 2020-12-08 | Stop reason: HOSPADM

## 2020-12-07 RX ORDER — LIDOCAINE HYDROCHLORIDE 10 MG/ML
INJECTION, SOLUTION EPIDURAL; INFILTRATION; INTRACAUDAL; PERINEURAL AS NEEDED
Status: DISCONTINUED | OUTPATIENT
Start: 2020-12-07 | End: 2020-12-07

## 2020-12-07 RX ORDER — ASCORBIC ACID 1000 MG
1 TABLET ORAL DAILY
Status: DISCONTINUED | OUTPATIENT
Start: 2020-12-07 | End: 2020-12-08 | Stop reason: HOSPADM

## 2020-12-07 RX ORDER — OXYCODONE HYDROCHLORIDE 5 MG/1
5 TABLET ORAL EVERY 4 HOURS PRN
Status: DISCONTINUED | OUTPATIENT
Start: 2020-12-07 | End: 2020-12-08 | Stop reason: HOSPADM

## 2020-12-07 RX ORDER — OXYCODONE HYDROCHLORIDE 5 MG/1
5 TABLET ORAL EVERY 4 HOURS PRN
Qty: 5 TABLET | Refills: 0 | Status: SHIPPED | OUTPATIENT
Start: 2020-12-07 | End: 2020-12-12

## 2020-12-07 RX ORDER — FENTANYL CITRATE 50 UG/ML
INJECTION, SOLUTION INTRAMUSCULAR; INTRAVENOUS AS NEEDED
Status: DISCONTINUED | OUTPATIENT
Start: 2020-12-07 | End: 2020-12-07

## 2020-12-07 RX ORDER — ACETAMINOPHEN 325 MG/1
650 TABLET ORAL EVERY 4 HOURS PRN
Qty: 30 TABLET | Refills: 0
Start: 2020-12-07 | End: 2021-02-10 | Stop reason: HOSPADM

## 2020-12-07 RX ORDER — LIDOCAINE HYDROCHLORIDE 10 MG/ML
0.5 INJECTION, SOLUTION EPIDURAL; INFILTRATION; INTRACAUDAL; PERINEURAL ONCE AS NEEDED
Status: DISCONTINUED | OUTPATIENT
Start: 2020-12-07 | End: 2020-12-07 | Stop reason: HOSPADM

## 2020-12-07 RX ORDER — TAMSULOSIN HYDROCHLORIDE 0.4 MG/1
0.4 CAPSULE ORAL
Qty: 15 CAPSULE | Refills: 0 | Status: SHIPPED | OUTPATIENT
Start: 2020-12-07 | End: 2020-12-15

## 2020-12-07 RX ORDER — SUCCINYLCHOLINE/SOD CL,ISO/PF 100 MG/5ML
SYRINGE (ML) INTRAVENOUS AS NEEDED
Status: DISCONTINUED | OUTPATIENT
Start: 2020-12-07 | End: 2020-12-07

## 2020-12-07 RX ORDER — ONDANSETRON 2 MG/ML
4 INJECTION INTRAMUSCULAR; INTRAVENOUS ONCE AS NEEDED
Status: DISCONTINUED | OUTPATIENT
Start: 2020-12-07 | End: 2020-12-07 | Stop reason: HOSPADM

## 2020-12-07 RX ORDER — CEFAZOLIN SODIUM 1 G/50ML
1000 SOLUTION INTRAVENOUS ONCE
Status: COMPLETED | OUTPATIENT
Start: 2020-12-07 | End: 2020-12-07

## 2020-12-07 RX ORDER — GLYCOPYRROLATE 0.2 MG/ML
INJECTION INTRAMUSCULAR; INTRAVENOUS AS NEEDED
Status: DISCONTINUED | OUTPATIENT
Start: 2020-12-07 | End: 2020-12-07

## 2020-12-07 RX ORDER — EPHEDRINE SULFATE 50 MG/ML
INJECTION INTRAVENOUS AS NEEDED
Status: DISCONTINUED | OUTPATIENT
Start: 2020-12-07 | End: 2020-12-07

## 2020-12-07 RX ORDER — FENTANYL CITRATE/PF 50 MCG/ML
25 SYRINGE (ML) INJECTION
Status: DISCONTINUED | OUTPATIENT
Start: 2020-12-07 | End: 2020-12-07 | Stop reason: HOSPADM

## 2020-12-07 RX ORDER — NEOSTIGMINE METHYLSULFATE 1 MG/ML
INJECTION INTRAVENOUS AS NEEDED
Status: DISCONTINUED | OUTPATIENT
Start: 2020-12-07 | End: 2020-12-07

## 2020-12-07 RX ORDER — AMPICILLIN TRIHYDRATE 250 MG
CAPSULE ORAL DAILY
Status: DISCONTINUED | OUTPATIENT
Start: 2020-12-07 | End: 2020-12-07

## 2020-12-07 RX ORDER — DOCUSATE SODIUM 100 MG/1
100 CAPSULE, LIQUID FILLED ORAL 2 TIMES DAILY
Qty: 30 CAPSULE | Refills: 0 | Status: SHIPPED | OUTPATIENT
Start: 2020-12-07 | End: 2021-02-10 | Stop reason: HOSPADM

## 2020-12-07 RX ORDER — SODIUM CHLORIDE, SODIUM LACTATE, POTASSIUM CHLORIDE, CALCIUM CHLORIDE 600; 310; 30; 20 MG/100ML; MG/100ML; MG/100ML; MG/100ML
125 INJECTION, SOLUTION INTRAVENOUS CONTINUOUS
Status: DISCONTINUED | OUTPATIENT
Start: 2020-12-07 | End: 2020-12-08 | Stop reason: HOSPADM

## 2020-12-07 RX ORDER — MULTIVIT WITH MINERALS/LUTEIN
125 TABLET ORAL DAILY
Status: DISCONTINUED | OUTPATIENT
Start: 2020-12-07 | End: 2020-12-08 | Stop reason: HOSPADM

## 2020-12-07 RX ORDER — ONDANSETRON 2 MG/ML
4 INJECTION INTRAMUSCULAR; INTRAVENOUS EVERY 6 HOURS PRN
Status: DISCONTINUED | OUTPATIENT
Start: 2020-12-07 | End: 2020-12-08 | Stop reason: HOSPADM

## 2020-12-07 RX ORDER — MAGNESIUM HYDROXIDE 1200 MG/15ML
LIQUID ORAL AS NEEDED
Status: DISCONTINUED | OUTPATIENT
Start: 2020-12-07 | End: 2020-12-07 | Stop reason: HOSPADM

## 2020-12-07 RX ORDER — FUROSEMIDE 10 MG/ML
INJECTION INTRAMUSCULAR; INTRAVENOUS AS NEEDED
Status: DISCONTINUED | OUTPATIENT
Start: 2020-12-07 | End: 2020-12-07

## 2020-12-07 RX ORDER — CEPHALEXIN 500 MG/1
500 CAPSULE ORAL EVERY 6 HOURS SCHEDULED
Qty: 3 CAPSULE | Refills: 0 | Status: SHIPPED | OUTPATIENT
Start: 2020-12-07 | End: 2020-12-08

## 2020-12-07 RX ORDER — NAPROXEN 500 MG/1
500 TABLET ORAL 2 TIMES DAILY WITH MEALS
Qty: 10 TABLET | Refills: 0 | Status: SHIPPED | OUTPATIENT
Start: 2020-12-07 | End: 2020-12-18 | Stop reason: ALTCHOICE

## 2020-12-07 RX ORDER — HYDROMORPHONE HCL/PF 1 MG/ML
0.2 SYRINGE (ML) INJECTION
Status: DISCONTINUED | OUTPATIENT
Start: 2020-12-07 | End: 2020-12-07 | Stop reason: HOSPADM

## 2020-12-07 RX ORDER — DOCUSATE SODIUM 100 MG/1
100 CAPSULE, LIQUID FILLED ORAL 2 TIMES DAILY
Status: DISCONTINUED | OUTPATIENT
Start: 2020-12-07 | End: 2020-12-08 | Stop reason: HOSPADM

## 2020-12-07 RX ORDER — GABAPENTIN 100 MG/1
100 CAPSULE ORAL 3 TIMES DAILY
Status: DISCONTINUED | OUTPATIENT
Start: 2020-12-07 | End: 2020-12-08 | Stop reason: HOSPADM

## 2020-12-07 RX ORDER — ROCURONIUM BROMIDE 10 MG/ML
INJECTION, SOLUTION INTRAVENOUS AS NEEDED
Status: DISCONTINUED | OUTPATIENT
Start: 2020-12-07 | End: 2020-12-07

## 2020-12-07 RX ORDER — HYDROMORPHONE HCL/PF 1 MG/ML
0.5 SYRINGE (ML) INJECTION EVERY 2 HOUR PRN
Status: DISCONTINUED | OUTPATIENT
Start: 2020-12-07 | End: 2020-12-08 | Stop reason: HOSPADM

## 2020-12-07 RX ORDER — ACETAMINOPHEN 325 MG/1
650 TABLET ORAL EVERY 6 HOURS SCHEDULED
Status: DISCONTINUED | OUTPATIENT
Start: 2020-12-07 | End: 2020-12-08 | Stop reason: HOSPADM

## 2020-12-07 RX ORDER — LEVOFLOXACIN 5 MG/ML
250 INJECTION, SOLUTION INTRAVENOUS EVERY 24 HOURS
Status: COMPLETED | OUTPATIENT
Start: 2020-12-07 | End: 2020-12-07

## 2020-12-07 RX ORDER — PROPOFOL 10 MG/ML
INJECTION, EMULSION INTRAVENOUS AS NEEDED
Status: DISCONTINUED | OUTPATIENT
Start: 2020-12-07 | End: 2020-12-07

## 2020-12-07 RX ORDER — OXYCODONE HYDROCHLORIDE 5 MG/1
2.5 TABLET ORAL EVERY 4 HOURS PRN
Status: DISCONTINUED | OUTPATIENT
Start: 2020-12-07 | End: 2020-12-08 | Stop reason: HOSPADM

## 2020-12-07 RX ADMIN — GLYCOPYRROLATE 0.4 MG: 0.2 INJECTION, SOLUTION INTRAMUSCULAR; INTRAVENOUS at 09:32

## 2020-12-07 RX ADMIN — DOCUSATE SODIUM 100 MG: 100 CAPSULE, LIQUID FILLED ORAL at 17:53

## 2020-12-07 RX ADMIN — HYDROMORPHONE HYDROCHLORIDE 0.5 MG: 1 INJECTION, SOLUTION INTRAMUSCULAR; INTRAVENOUS; SUBCUTANEOUS at 11:53

## 2020-12-07 RX ADMIN — FENTANYL CITRATE 25 MCG: 50 INJECTION, SOLUTION INTRAMUSCULAR; INTRAVENOUS at 07:41

## 2020-12-07 RX ADMIN — EPHEDRINE SULFATE 5 MG: 50 INJECTION, SOLUTION INTRAVENOUS at 08:49

## 2020-12-07 RX ADMIN — NEOSTIGMINE METHYLSULFATE 3 MG: 1 INJECTION INTRAVENOUS at 09:32

## 2020-12-07 RX ADMIN — ROCURONIUM BROMIDE 40 MG: 10 SOLUTION INTRAVENOUS at 07:47

## 2020-12-07 RX ADMIN — PHENYLEPHRINE HYDROCHLORIDE 200 MCG: 10 INJECTION INTRAVENOUS at 08:01

## 2020-12-07 RX ADMIN — B-COMPLEX W/ C & FOLIC ACID TAB 1 TABLET: TAB at 15:42

## 2020-12-07 RX ADMIN — FENTANYL CITRATE 50 MCG: 50 INJECTION, SOLUTION INTRAMUSCULAR; INTRAVENOUS at 07:39

## 2020-12-07 RX ADMIN — PHENYLEPHRINE HYDROCHLORIDE 100 MCG: 10 INJECTION INTRAVENOUS at 07:44

## 2020-12-07 RX ADMIN — PROPOFOL 150 MG: 10 INJECTION, EMULSION INTRAVENOUS at 07:41

## 2020-12-07 RX ADMIN — PHENYLEPHRINE HYDROCHLORIDE 200 MCG: 10 INJECTION INTRAVENOUS at 07:47

## 2020-12-07 RX ADMIN — OXYCODONE HYDROCHLORIDE 2.5 MG: 5 TABLET ORAL at 14:05

## 2020-12-07 RX ADMIN — ACETAMINOPHEN 650 MG: 325 TABLET, FILM COATED ORAL at 21:07

## 2020-12-07 RX ADMIN — Medication 1 TABLET: at 15:42

## 2020-12-07 RX ADMIN — PHENYLEPHRINE HYDROCHLORIDE 40 MCG/MIN: 10 INJECTION INTRAVENOUS at 08:01

## 2020-12-07 RX ADMIN — ONDANSETRON 4 MG: 2 INJECTION INTRAMUSCULAR; INTRAVENOUS at 10:01

## 2020-12-07 RX ADMIN — FENTANYL CITRATE 25 MCG: 50 INJECTION INTRAMUSCULAR; INTRAVENOUS at 10:01

## 2020-12-07 RX ADMIN — SODIUM CHLORIDE, SODIUM LACTATE, POTASSIUM CHLORIDE, AND CALCIUM CHLORIDE: .6; .31; .03; .02 INJECTION, SOLUTION INTRAVENOUS at 09:15

## 2020-12-07 RX ADMIN — LIDOCAINE HYDROCHLORIDE 50 MG: 10 INJECTION, SOLUTION EPIDURAL; INFILTRATION; INTRACAUDAL at 07:41

## 2020-12-07 RX ADMIN — SODIUM CHLORIDE, SODIUM LACTATE, POTASSIUM CHLORIDE, AND CALCIUM CHLORIDE 125 ML/HR: .6; .31; .03; .02 INJECTION, SOLUTION INTRAVENOUS at 14:03

## 2020-12-07 RX ADMIN — GABAPENTIN 100 MG: 100 CAPSULE ORAL at 21:07

## 2020-12-07 RX ADMIN — CEFAZOLIN SODIUM 1000 MG: 1 SOLUTION INTRAVENOUS at 07:39

## 2020-12-07 RX ADMIN — SODIUM CHLORIDE, SODIUM LACTATE, POTASSIUM CHLORIDE, AND CALCIUM CHLORIDE: .6; .31; .03; .02 INJECTION, SOLUTION INTRAVENOUS at 07:39

## 2020-12-07 RX ADMIN — FUROSEMIDE 40 MG: 10 INJECTION, SOLUTION INTRAMUSCULAR; INTRAVENOUS at 09:13

## 2020-12-07 RX ADMIN — ACETAMINOPHEN 650 MG: 325 TABLET, FILM COATED ORAL at 15:41

## 2020-12-07 RX ADMIN — FENTANYL CITRATE 25 MCG: 50 INJECTION, SOLUTION INTRAMUSCULAR; INTRAVENOUS at 08:35

## 2020-12-07 RX ADMIN — Medication 100 MG: at 07:41

## 2020-12-07 RX ADMIN — SODIUM CHLORIDE, SODIUM LACTATE, POTASSIUM CHLORIDE, AND CALCIUM CHLORIDE 125 ML/HR: .6; .31; .03; .02 INJECTION, SOLUTION INTRAVENOUS at 21:18

## 2020-12-07 RX ADMIN — LEVOFLOXACIN 250 MG: 5 INJECTION, SOLUTION INTRAVENOUS at 15:43

## 2020-12-07 RX ADMIN — TAMSULOSIN HYDROCHLORIDE 0.4 MG: 0.4 CAPSULE ORAL at 21:07

## 2020-12-07 RX ADMIN — GABAPENTIN 100 MG: 100 CAPSULE ORAL at 15:42

## 2020-12-08 VITALS
WEIGHT: 163 LBS | RESPIRATION RATE: 18 BRPM | HEART RATE: 85 BPM | SYSTOLIC BLOOD PRESSURE: 158 MMHG | HEIGHT: 63 IN | TEMPERATURE: 98.2 F | OXYGEN SATURATION: 95 % | DIASTOLIC BLOOD PRESSURE: 85 MMHG | BODY MASS INDEX: 28.88 KG/M2

## 2020-12-08 LAB
ANION GAP SERPL CALCULATED.3IONS-SCNC: 7 MMOL/L (ref 4–13)
BUN SERPL-MCNC: 11 MG/DL (ref 5–25)
CALCIUM SERPL-MCNC: 8.7 MG/DL (ref 8.3–10.1)
CHLORIDE SERPL-SCNC: 105 MMOL/L (ref 100–108)
CO2 SERPL-SCNC: 28 MMOL/L (ref 21–32)
CREAT SERPL-MCNC: 1.06 MG/DL (ref 0.6–1.3)
ERYTHROCYTE [DISTWIDTH] IN BLOOD BY AUTOMATED COUNT: 13.7 % (ref 11.6–15.1)
GFR SERPL CREATININE-BSD FRML MDRD: 51 ML/MIN/1.73SQ M
GLUCOSE SERPL-MCNC: 108 MG/DL (ref 65–140)
HCT VFR BLD AUTO: 31.9 % (ref 34.8–46.1)
HGB BLD-MCNC: 10.3 G/DL (ref 11.5–15.4)
MCH RBC QN AUTO: 31 PG (ref 26.8–34.3)
MCHC RBC AUTO-ENTMCNC: 32.3 G/DL (ref 31.4–37.4)
MCV RBC AUTO: 96 FL (ref 82–98)
PLATELET # BLD AUTO: 246 THOUSANDS/UL (ref 149–390)
PMV BLD AUTO: 9.5 FL (ref 8.9–12.7)
POTASSIUM SERPL-SCNC: 3.7 MMOL/L (ref 3.5–5.3)
RBC # BLD AUTO: 3.32 MILLION/UL (ref 3.81–5.12)
SODIUM SERPL-SCNC: 140 MMOL/L (ref 136–145)
WBC # BLD AUTO: 7.92 THOUSAND/UL (ref 4.31–10.16)

## 2020-12-08 PROCEDURE — 99225 PR SBSQ OBSERVATION CARE/DAY 25 MINUTES: CPT | Performed by: INTERNAL MEDICINE

## 2020-12-08 PROCEDURE — 80048 BASIC METABOLIC PNL TOTAL CA: CPT | Performed by: UROLOGY

## 2020-12-08 PROCEDURE — 99024 POSTOP FOLLOW-UP VISIT: CPT | Performed by: NURSE PRACTITIONER

## 2020-12-08 PROCEDURE — 85027 COMPLETE CBC AUTOMATED: CPT | Performed by: UROLOGY

## 2020-12-08 RX ADMIN — SODIUM CHLORIDE, SODIUM LACTATE, POTASSIUM CHLORIDE, AND CALCIUM CHLORIDE 125 ML/HR: .6; .31; .03; .02 INJECTION, SOLUTION INTRAVENOUS at 05:48

## 2020-12-08 RX ADMIN — B-COMPLEX W/ C & FOLIC ACID TAB 1 TABLET: TAB at 08:10

## 2020-12-08 RX ADMIN — DOCUSATE SODIUM 100 MG: 100 CAPSULE, LIQUID FILLED ORAL at 08:10

## 2020-12-08 RX ADMIN — Medication 1 TABLET: at 08:10

## 2020-12-08 RX ADMIN — Medication 125 MG: at 08:11

## 2020-12-08 RX ADMIN — GABAPENTIN 100 MG: 100 CAPSULE ORAL at 08:10

## 2020-12-09 LAB
ABO GROUP BLD BPU: NORMAL
ABO GROUP BLD BPU: NORMAL
BPU ID: NORMAL
BPU ID: NORMAL
CROSSMATCH: NORMAL
CROSSMATCH: NORMAL
UNIT DISPENSE STATUS: NORMAL
UNIT DISPENSE STATUS: NORMAL
UNIT PRODUCT CODE: NORMAL
UNIT PRODUCT CODE: NORMAL
UNIT RH: NORMAL
UNIT RH: NORMAL

## 2020-12-14 ENCOUNTER — TELEPHONE (OUTPATIENT)
Dept: VASCULAR SURGERY | Facility: HOSPITAL | Age: 75
End: 2020-12-14

## 2020-12-14 ENCOUNTER — HOSPITAL ENCOUNTER (OUTPATIENT)
Dept: NON INVASIVE DIAGNOSTICS | Facility: CLINIC | Age: 75
Discharge: HOME | End: 2020-12-14
Payer: MEDICARE

## 2020-12-14 DIAGNOSIS — I71.4 ABDOMINAL AORTIC ANEURYSM (AAA) 3.0 CM TO 5.0 CM IN DIAMETER IN FEMALE (HCC): ICD-10-CM

## 2020-12-14 DIAGNOSIS — I71.4 ABDOMINAL AORTIC ANEURYSM (AAA) WITHOUT RUPTURE (HCC): ICD-10-CM

## 2020-12-14 PROCEDURE — 93923 UPR/LXTR ART STDY 3+ LVLS: CPT

## 2020-12-14 PROCEDURE — 93922 UPR/L XTREMITY ART 2 LEVELS: CPT | Performed by: SURGERY

## 2020-12-14 PROCEDURE — 93925 LOWER EXTREMITY STUDY: CPT | Performed by: SURGERY

## 2020-12-14 PROCEDURE — 93925 LOWER EXTREMITY STUDY: CPT

## 2020-12-15 DIAGNOSIS — N20.0 NEPHROLITHIASIS: ICD-10-CM

## 2020-12-15 RX ORDER — TAMSULOSIN HYDROCHLORIDE 0.4 MG/1
CAPSULE ORAL
Qty: 15 CAPSULE | Refills: 0 | Status: SHIPPED | OUTPATIENT
Start: 2020-12-15 | End: 2021-02-04 | Stop reason: HOSPADM

## 2020-12-16 ENCOUNTER — TELEMEDICINE (OUTPATIENT)
Dept: VASCULAR SURGERY | Facility: CLINIC | Age: 75
End: 2020-12-16
Payer: MEDICARE

## 2020-12-16 VITALS — BODY MASS INDEX: 27.64 KG/M2 | WEIGHT: 156 LBS | HEIGHT: 63 IN

## 2020-12-16 DIAGNOSIS — I71.4 ABDOMINAL AORTIC ANEURYSM (AAA) WITHOUT RUPTURE (HCC): Primary | ICD-10-CM

## 2020-12-16 DIAGNOSIS — I71.4 ABDOMINAL AORTIC ANEURYSM (AAA) 3.0 CM TO 5.0 CM IN DIAMETER IN FEMALE (HCC): ICD-10-CM

## 2020-12-16 PROCEDURE — 99212 OFFICE O/P EST SF 10 MIN: CPT | Performed by: SURGERY

## 2020-12-16 RX ORDER — PILOCARPINE HYDROCHLORIDE 5 MG/1
5 TABLET, FILM COATED ORAL 4 TIMES DAILY
COMMUNITY

## 2020-12-18 ENCOUNTER — OFFICE VISIT (OUTPATIENT)
Dept: INTERNAL MEDICINE CLINIC | Facility: CLINIC | Age: 75
End: 2020-12-18
Payer: MEDICARE

## 2020-12-18 VITALS
HEART RATE: 102 BPM | OXYGEN SATURATION: 98 % | BODY MASS INDEX: 27.93 KG/M2 | SYSTOLIC BLOOD PRESSURE: 122 MMHG | TEMPERATURE: 98.6 F | WEIGHT: 157.6 LBS | HEIGHT: 63 IN | DIASTOLIC BLOOD PRESSURE: 76 MMHG

## 2020-12-18 DIAGNOSIS — F41.9 ANXIETY: ICD-10-CM

## 2020-12-18 DIAGNOSIS — E78.2 MIXED HYPERLIPIDEMIA: ICD-10-CM

## 2020-12-18 DIAGNOSIS — N18.31 STAGE 3A CHRONIC KIDNEY DISEASE (HCC): ICD-10-CM

## 2020-12-18 DIAGNOSIS — M35.01 SJOGREN'S SYNDROME WITH KERATOCONJUNCTIVITIS SICCA (HCC): ICD-10-CM

## 2020-12-18 DIAGNOSIS — N20.0 NEPHROLITHIASIS: ICD-10-CM

## 2020-12-18 DIAGNOSIS — I10 BENIGN HYPERTENSION: Primary | ICD-10-CM

## 2020-12-18 PROCEDURE — 99214 OFFICE O/P EST MOD 30 MIN: CPT | Performed by: PHYSICIAN ASSISTANT

## 2020-12-18 RX ORDER — ROSUVASTATIN CALCIUM 5 MG/1
5 TABLET, COATED ORAL DAILY
Qty: 30 TABLET | Refills: 5 | Status: SHIPPED | OUTPATIENT
Start: 2020-12-18 | End: 2021-02-10 | Stop reason: HOSPADM

## 2020-12-18 RX ORDER — PAROXETINE 10 MG/1
10 TABLET, FILM COATED ORAL DAILY
Qty: 30 TABLET | Refills: 5 | Status: SHIPPED | OUTPATIENT
Start: 2020-12-18 | End: 2021-06-17

## 2020-12-21 ENCOUNTER — TELEPHONE (OUTPATIENT)
Dept: NEPHROLOGY | Facility: CLINIC | Age: 75
End: 2020-12-21

## 2021-01-06 ENCOUNTER — TELEPHONE (OUTPATIENT)
Dept: NEPHROLOGY | Facility: CLINIC | Age: 76
End: 2021-01-06

## 2021-01-06 ENCOUNTER — OFFICE VISIT (OUTPATIENT)
Dept: NEPHROLOGY | Facility: CLINIC | Age: 76
End: 2021-01-06
Payer: MEDICARE

## 2021-01-06 VITALS
DIASTOLIC BLOOD PRESSURE: 62 MMHG | HEIGHT: 63 IN | BODY MASS INDEX: 27.57 KG/M2 | SYSTOLIC BLOOD PRESSURE: 110 MMHG | HEART RATE: 70 BPM | WEIGHT: 155.6 LBS

## 2021-01-06 DIAGNOSIS — N20.0 NEPHROLITHIASIS: Primary | ICD-10-CM

## 2021-01-06 PROBLEM — N18.31 STAGE 3A CHRONIC KIDNEY DISEASE (HCC): Status: RESOLVED | Noted: 2020-11-03 | Resolved: 2021-01-06

## 2021-01-06 PROCEDURE — 99214 OFFICE O/P EST MOD 30 MIN: CPT | Performed by: INTERNAL MEDICINE

## 2021-01-06 NOTE — PROGRESS NOTES
NEPHROLOGY PROGRESS NOTE    Tyronne Pallas 76 y o  female MRN: [id number]  Unit/Bed#:  Encounter: [id number]  Reason for Consult:  Nephrolithiasis    This is my 1st visit in the office with the patient and she is here for kidney stone prevention  I met her when she was hospitalized and she underwent a nephrolithotomy removal of large stones  She states she had surgery once in the past before never had an acute event fortunately  We reviewed her medications  ASSESSMENT/PLAN:  1  Nephrolithiasis    The patient has history of suspected calcium nephrolithiasis although she is not aware they have ever analyzed the specimen  She did have surgical extraction of stones recently I do not see the results of the analysis in the computer so I will contact the lab  Or gross start off the evaluation with a 24 hour urine collection and I discussed in detail with the patient regarding calcium, oxalate and citrate which of the 3 main molecules that can contribute to stone formation in calcium containing stones  She personally still has a ureteral stent and will follow-up with Urology  She also received imaging and when I looked at her CT scan approximately high did not see significant stone burden other than the stones that were likely removed but imaging is not been done since the surgery  Once I get the results of the urine collection I will contact her for dietary under medical treatment and then guide her from there with follow-up  Renal functions normal with baseline creatinine of 1  There was no proteinuria on urinalysis  SUBJECTIVE:  ROS    OBJECTIVE:  Current Weight: Weight - Scale: 70 6 kg (155 lb 9 6 oz)  [email or web address]:     Blood pressure 110/62, pulse 70, height 5' 3" (1 6 m), weight 70 6 kg (155 lb 9 6 oz), not currently breastfeeding  , Body mass index is 27 56 kg/m²      [unfilled]    Physical Exam: /62 (BP Location: Right arm, Patient Position: Sitting, Cuff Size: Standard) Pulse 70   Ht 5' 3" (1 6 m)   Wt 70 6 kg (155 lb 9 6 oz)   BMI 27 56 kg/m²   Physical Exam  Constitutional:       General: She is not in acute distress  Appearance: Normal appearance  She is not ill-appearing or diaphoretic  HENT:      Head: Normocephalic and atraumatic  Nose:      Comments: Wearing mask     Mouth/Throat:      Comments: Wearing mask  Eyes:      General: No scleral icterus  Extraocular Movements: Extraocular movements intact  Neck:      Musculoskeletal: Normal range of motion and neck supple  Cardiovascular:      Rate and Rhythm: Normal rate and regular rhythm  Heart sounds: No friction rub  No gallop  Comments: No edema  Pulmonary:      Effort: Pulmonary effort is normal  No respiratory distress  Breath sounds: Normal breath sounds  No wheezing, rhonchi or rales  Abdominal:      General: Bowel sounds are normal  There is no distension  Palpations: Abdomen is soft  Tenderness: There is no abdominal tenderness  There is no rebound  Neurological:      General: No focal deficit present  Mental Status: She is alert and oriented to person, place, and time  Mental status is at baseline  Psychiatric:         Mood and Affect: Mood normal          Behavior: Behavior normal          Thought Content:  Thought content normal          Judgment: Judgment normal          Medications:    Current Outpatient Medications:     acetaminophen (TYLENOL) 325 mg tablet, Take 2 tablets (650 mg total) by mouth every 4 (four) hours as needed for mild pain, Disp: 30 tablet, Rfl: 0    Ascorbic Acid (VITAMIN C) 100 MG tablet, Take 1 tablet by mouth daily, Disp: , Rfl:     cholecalciferol (VITAMIN D3) 1,000 units tablet, Take 1 tablet by mouth daily 5000 mg, Disp: , Rfl:     Coenzyme Q10 (CO Q 10) 10 MG CAPS, Take 1 capsule by mouth daily, Disp: , Rfl:     docusate sodium (COLACE) 100 mg capsule, Take 1 capsule (100 mg total) by mouth 2 (two) times a day for 15 days, Disp: 30 capsule, Rfl: 0    Lutein 40 MG CAPS, Take by mouth, Disp: , Rfl:     olmesartan (BENICAR) 40 mg tablet, Take 1 tablet (40 mg total) by mouth daily, Disp: 90 tablet, Rfl: 1    Omega-3 Fatty Acids (FISH OIL PO), Take 7,000 mg by mouth daily , Disp: , Rfl:     PARoxetine (PAXIL) 10 mg tablet, Take 1 tablet (10 mg total) by mouth daily, Disp: 30 tablet, Rfl: 5    pilocarpine (SALAGEN) 5 mg tablet, Take 5 mg by mouth 4 (four) times a day, Disp: , Rfl:     rosuvastatin (CRESTOR) 5 mg tablet, Take 1 tablet (5 mg total) by mouth daily, Disp: 30 tablet, Rfl: 5    tamsulosin (FLOMAX) 0 4 mg, TAKE 1 CAPSULE BY MOUTH EVERY DAY WITH DINNER, Disp: 15 capsule, Rfl: 0    B Complex Vitamins (VITAMIN B COMPLEX) TABS, Take 1 tablet by mouth daily, Disp: , Rfl:     CINNAMON PO, Take 1 capsule by mouth daily , Disp: , Rfl:     Multiple Vitamins-Minerals (EYE VITAMINS & MINERALS PO), Take by mouth, Disp: , Rfl:     Multiple Vitamins-Minerals (MULTIVITAMIN ADULT PO), Take 1 tablet by mouth daily, Disp: , Rfl:     sodium chloride, PF, 0 9 %, 10 mL by Intracatheter route every 12 (twelve) hours Intracatheter flushing daily (Patient not taking: Reported on 1/6/2021), Disp: 30 Syringe, Rfl: 3    Laboratory Results:  Lab Results   Component Value Date    WBC 7 92 12/08/2020    HGB 10 3 (L) 12/08/2020    HCT 31 9 (L) 12/08/2020    MCV 96 12/08/2020     12/08/2020     Lab Results   Component Value Date    SODIUM 140 12/08/2020    K 3 7 12/08/2020     12/08/2020    CO2 28 12/08/2020    BUN 11 12/08/2020    CREATININE 1 06 12/08/2020    GLUC 108 12/08/2020    CALCIUM 8 7 12/08/2020     Lab Results   Component Value Date    CALCIUM 8 7 12/08/2020    PHOS 4 0 06/06/2017     No results found for: LABPROT

## 2021-01-06 NOTE — PATIENT INSTRUCTIONS
You are here for your initial evaluation in the office for kidney stone prevention  We reviewed her history  Or going to start the evaluation with a 24 hour urine collection as we discussed that I will contact you with the results to define either medical and or dietary treatment  Obtain 24 hour urine and I will call you with results

## 2021-01-06 NOTE — TELEPHONE ENCOUNTER
COVID Pre-Visit Screening/ spouce    1  Is this a family member screening? Yes  2  Have you traveled outside of your state in the past 2 weeks? No  3  Do you presently have a fever or flu-like symptoms? No  4  Do you have symptoms of an upper respiratory infection like runny nose, sore throat, or cough? No  5  Are you suffering from new headache that you have not had in the past?  No  6  Do you have/have you experienced any new shortness of breath recently? No  7  Do you have any new diarrhea, nausea or vomiting? No  8  Have you been in contact with anyone who has been sick or diagnosed with COVID-19? No  9  Do you have any new loss of taste or smell? No  10  Are you able to wear a mask without a valve for the entire visit? Yes  11  Have kathy been tested for covid-19 within the past 14 days?  NO Please make an appointment with mom to follow up back lesion

## 2021-01-07 ENCOUNTER — LAB (OUTPATIENT)
Dept: LAB | Facility: HOSPITAL | Age: 76
End: 2021-01-07
Attending: INTERNAL MEDICINE
Payer: MEDICARE

## 2021-01-07 ENCOUNTER — TRANSCRIBE ORDERS (OUTPATIENT)
Dept: ADMINISTRATIVE | Facility: HOSPITAL | Age: 76
End: 2021-01-07

## 2021-01-07 DIAGNOSIS — M35.00 SICCA SYNDROME (HCC): Primary | ICD-10-CM

## 2021-01-07 DIAGNOSIS — H91.92 HEARING LOSS OF LEFT EAR, UNSPECIFIED HEARING LOSS TYPE: ICD-10-CM

## 2021-01-07 LAB
BACTERIA UR QL AUTO: ABNORMAL /HPF
BILIRUB UR QL STRIP: NEGATIVE
C3 SERPL-MCNC: 143 MG/DL (ref 90–180)
C4 SERPL-MCNC: 30 MG/DL (ref 10–40)
CLARITY UR: ABNORMAL
COLOR UR: ABNORMAL
CREAT SERPL-MCNC: 1.1 MG/DL (ref 0.6–1.3)
GFR SERPL CREATININE-BSD FRML MDRD: 49 ML/MIN/1.73SQ M
GLUCOSE UR STRIP-MCNC: NEGATIVE MG/DL
HGB UR QL STRIP.AUTO: ABNORMAL
IGA SERPL-MCNC: 226 MG/DL (ref 70–400)
IGG SERPL-MCNC: 1450 MG/DL (ref 700–1600)
IGM SERPL-MCNC: 38 MG/DL (ref 40–230)
KETONES UR STRIP-MCNC: ABNORMAL MG/DL
LEUKOCYTE ESTERASE UR QL STRIP: ABNORMAL
NITRITE UR QL STRIP: NEGATIVE
NON-SQ EPI CELLS URNS QL MICRO: ABNORMAL /HPF
OTHER STN SPEC: ABNORMAL
PH UR STRIP.AUTO: 6 [PH]
PROT UR STRIP-MCNC: ABNORMAL MG/DL
RBC #/AREA URNS AUTO: ABNORMAL /HPF
SP GR UR STRIP.AUTO: 1.02 (ref 1–1.03)
UROBILINOGEN UR QL STRIP.AUTO: 0.2 E.U./DL
WBC #/AREA URNS AUTO: ABNORMAL /HPF

## 2021-01-07 PROCEDURE — 82784 ASSAY IGA/IGD/IGG/IGM EACH: CPT

## 2021-01-07 PROCEDURE — 86430 RHEUMATOID FACTOR TEST QUAL: CPT

## 2021-01-07 PROCEDURE — 84165 PROTEIN E-PHORESIS SERUM: CPT

## 2021-01-07 PROCEDURE — 36415 COLL VENOUS BLD VENIPUNCTURE: CPT

## 2021-01-07 PROCEDURE — 84181 WESTERN BLOT TEST: CPT

## 2021-01-07 PROCEDURE — 86255 FLUORESCENT ANTIBODY SCREEN: CPT

## 2021-01-07 PROCEDURE — 82232 ASSAY OF BETA-2 PROTEIN: CPT

## 2021-01-07 PROCEDURE — 86162 COMPLEMENT TOTAL (CH50): CPT

## 2021-01-07 PROCEDURE — 86160 COMPLEMENT ANTIGEN: CPT

## 2021-01-07 PROCEDURE — 82565 ASSAY OF CREATININE: CPT

## 2021-01-07 PROCEDURE — 84165 PROTEIN E-PHORESIS SERUM: CPT | Performed by: PATHOLOGY

## 2021-01-07 PROCEDURE — 86235 NUCLEAR ANTIGEN ANTIBODY: CPT

## 2021-01-07 PROCEDURE — 81001 URINALYSIS AUTO W/SCOPE: CPT | Performed by: INTERNAL MEDICINE

## 2021-01-07 PROCEDURE — 86431 RHEUMATOID FACTOR QUANT: CPT

## 2021-01-07 PROCEDURE — 86200 CCP ANTIBODY: CPT

## 2021-01-07 PROCEDURE — 86147 CARDIOLIPIN ANTIBODY EA IG: CPT

## 2021-01-07 PROCEDURE — 86225 DNA ANTIBODY NATIVE: CPT

## 2021-01-08 LAB
ALBUMIN SERPL ELPH-MCNC: 3.66 G/DL (ref 3.5–5)
ALBUMIN SERPL ELPH-MCNC: 49.4 % (ref 52–65)
ALPHA1 GLOB SERPL ELPH-MCNC: 0.45 G/DL (ref 0.1–0.4)
ALPHA1 GLOB SERPL ELPH-MCNC: 6.1 % (ref 2.5–5)
ALPHA2 GLOB SERPL ELPH-MCNC: 1.02 G/DL (ref 0.4–1.2)
ALPHA2 GLOB SERPL ELPH-MCNC: 13.8 % (ref 7–13)
BETA GLOB ABNORMAL SERPL ELPH-MCNC: 0.44 G/DL (ref 0.4–0.8)
BETA1 GLOB SERPL ELPH-MCNC: 5.9 % (ref 5–13)
BETA2 GLOB SERPL ELPH-MCNC: 5.7 % (ref 2–8)
BETA2+GAMMA GLOB SERPL ELPH-MCNC: 0.42 G/DL (ref 0.2–0.5)
CARDIOLIPIN IGA SER IA-ACNC: <9 APL U/ML (ref 0–11)
CARDIOLIPIN IGG SER IA-ACNC: <9 GPL U/ML (ref 0–14)
CARDIOLIPIN IGM SER IA-ACNC: 14 MPL U/ML (ref 0–12)
CH50 SERPL-ACNC: >60 U/ML
CRYOGLOB RF SER-ACNC: ABNORMAL [IU]/ML
DSDNA AB SER-ACNC: <1 IU/ML (ref 0–9)
ENA RNP AB SER-ACNC: <0.2 AI (ref 0–0.9)
ENA SM AB SER-ACNC: <0.2 AI (ref 0–0.9)
GAMMA GLOB ABNORMAL SERPL ELPH-MCNC: 1.41 G/DL (ref 0.5–1.6)
GAMMA GLOB SERPL ELPH-MCNC: 19.1 % (ref 12–22)
IGG/ALB SER: 0.98 {RATIO} (ref 1.1–1.8)
PROT PATTERN SERPL ELPH-IMP: ABNORMAL
PROT SERPL-MCNC: 7.4 G/DL (ref 6.4–8.2)
RHEUMATOID FACT SER QL LA: POSITIVE

## 2021-01-09 ENCOUNTER — TRANSCRIBE ORDERS (OUTPATIENT)
Dept: ADMINISTRATIVE | Facility: HOSPITAL | Age: 76
End: 2021-01-09

## 2021-01-09 ENCOUNTER — LAB (OUTPATIENT)
Dept: LAB | Facility: HOSPITAL | Age: 76
End: 2021-01-09
Attending: INTERNAL MEDICINE
Payer: MEDICARE

## 2021-01-09 DIAGNOSIS — N20.0 URIC ACID NEPHROLITHIASIS: Primary | ICD-10-CM

## 2021-01-09 LAB
B2 MICROGLOB SERPL-MCNC: 3.2 MG/L (ref 0.6–2.4)
CCP IGA+IGG SERPL IA-ACNC: 6 UNITS (ref 0–19)

## 2021-01-09 PROCEDURE — 84133 ASSAY OF URINE POTASSIUM: CPT | Performed by: INTERNAL MEDICINE

## 2021-01-09 PROCEDURE — 83735 ASSAY OF MAGNESIUM: CPT | Performed by: INTERNAL MEDICINE

## 2021-01-09 PROCEDURE — 81003 URINALYSIS AUTO W/O SCOPE: CPT | Performed by: INTERNAL MEDICINE

## 2021-01-09 PROCEDURE — 82570 ASSAY OF URINE CREATININE: CPT | Performed by: INTERNAL MEDICINE

## 2021-01-09 PROCEDURE — 83935 ASSAY OF URINE OSMOLALITY: CPT | Performed by: INTERNAL MEDICINE

## 2021-01-09 PROCEDURE — 84392 ASSAY OF URINE SULFATE: CPT | Performed by: INTERNAL MEDICINE

## 2021-01-09 PROCEDURE — 84300 ASSAY OF URINE SODIUM: CPT | Performed by: INTERNAL MEDICINE

## 2021-01-09 PROCEDURE — 82436 ASSAY OF URINE CHLORIDE: CPT | Performed by: INTERNAL MEDICINE

## 2021-01-09 PROCEDURE — 82340 ASSAY OF CALCIUM IN URINE: CPT | Performed by: INTERNAL MEDICINE

## 2021-01-09 PROCEDURE — 84560 ASSAY OF URINE/URIC ACID: CPT | Performed by: INTERNAL MEDICINE

## 2021-01-09 PROCEDURE — 82140 ASSAY OF AMMONIA: CPT | Performed by: INTERNAL MEDICINE

## 2021-01-09 PROCEDURE — 84105 ASSAY OF URINE PHOSPHORUS: CPT | Performed by: INTERNAL MEDICINE

## 2021-01-09 PROCEDURE — 82131 AMINO ACIDS SINGLE QUANT: CPT | Performed by: INTERNAL MEDICINE

## 2021-01-09 PROCEDURE — 82507 ASSAY OF CITRATE: CPT | Performed by: INTERNAL MEDICINE

## 2021-01-09 PROCEDURE — 83945 ASSAY OF OXALATE: CPT | Performed by: INTERNAL MEDICINE

## 2021-01-11 LAB
C-ANCA TITR SER IF: NORMAL TITER
MYELOPEROXIDASE AB SER IA-ACNC: <9 U/ML (ref 0–9)
P-ANCA ATYPICAL TITR SER IF: NORMAL TITER
P-ANCA TITR SER IF: NORMAL TITER
PROTEINASE3 AB SER IA-ACNC: <3.5 U/ML (ref 0–3.5)

## 2021-01-14 LAB — MISCELLANEOUS LAB TEST RESULT: NORMAL

## 2021-01-19 ENCOUNTER — HOSPITAL ENCOUNTER (OUTPATIENT)
Dept: CT IMAGING | Facility: HOSPITAL | Age: 76
Discharge: HOME | End: 2021-01-19
Attending: UROLOGY
Payer: MEDICARE

## 2021-01-19 DIAGNOSIS — N20.0 NEPHROLITHIASIS: ICD-10-CM

## 2021-01-19 PROCEDURE — 74178 CT ABD&PLV WO CNTR FLWD CNTR: CPT

## 2021-01-19 PROCEDURE — G1004 CDSM NDSC: HCPCS

## 2021-01-19 RX ADMIN — IOHEXOL 100 ML: 350 INJECTION, SOLUTION INTRAVENOUS at 15:20

## 2021-01-20 DIAGNOSIS — N20.0 NEPHROLITHIASIS: Primary | ICD-10-CM

## 2021-01-20 PROBLEM — R82.992 HYPEROXALURIA: Status: RESOLVED | Noted: 2017-07-10 | Resolved: 2021-01-20

## 2021-01-20 LAB
AMMONIA 24H UR-MRATE: 10 MEQ/24 HR
AMMONIA UR-SCNC: ABNORMAL UG/DL
CA H2 PHOS DIHYD CRY URNS QL MICRO: 0.5 RATIO (ref 0–3)
CALCIUM 24H UR-MCNC: 7.1 MG/DL
CALCIUM 24H UR-MRATE: 65.7 MG/24 HR (ref 100–300)
CHLORIDE 24H UR-SCNC: 49 MMOL/L
CHLORIDE 24H UR-SRATE: 45 MMOL/24 HR (ref 110–250)
CITRATE 24H UR-MCNC: 111 MG/L
CITRATE 24H UR-MRATE: 103 MG/24 HR (ref 320–1240)
COM CRY STONE QL IR: 9.08 RATIO (ref 0–6)
CREAT 24H UR-MCNC: 78.4 MG/DL
CREAT 24H UR-MRATE: 725.2 MG/24 HR (ref 800–1800)
CYSTINE 24H UR-MCNC: 3.26 MG/L
CYSTINE 24H UR-MRATE: 3.02 MG/24 HR (ref 10–100)
MAGNESIUM 24H UR-MRATE: 27 MG/24 HR (ref 12–293)
MAGNESIUM UR-MCNC: 2.9 MG/DL
NA URATE CRY STONE QL IR: 1.31 RATIO (ref 0–4)
OSMOLALITY UR: 325 MOSMOL/KG (ref 300–900)
OXALATE 24H UR-MRATE: 27 MG/24 HR (ref 4–31)
OXALATE UR-MCNC: 29 MG/L
PH 24H UR: 5.7 [PH]
PHOSPHATE 24H UR-MCNC: 41 MG/DL
PHOSPHATE 24H UR-MRATE: 379.3 MG/24 HR (ref 400–1300)
PLEASE NOTE (STONE RISK): ABNORMAL
POTASSIUM 24H UR-SCNC: 25.9 MMOL/24 HR (ref 25–125)
POTASSIUM UR-SCNC: 28 MMOL/L
PRESERVED URINE: 925 ML/24 HR (ref 600–1600)
SODIUM 24H UR-SCNC: 61 MMOL/L
SODIUM 24H UR-SRATE: 56 MMOL/24 HR (ref 39–258)
SPECIMEN VOL 24H UR: 925 ML/24 HR (ref 600–1600)
SULFATE 24H UR-MCNC: 6 MEQ/24 HR (ref 0–30)
SULFATE UR-MCNC: 7 MEQ/L
TRI-PHOS CRY STONE MICRO: 0.01 RATIO (ref 0–1)
URATE 24H UR-MCNC: 24.3 MG/DL
URATE 24H UR-MRATE: 225 MG/24 HR (ref 250–750)
URATE DIHYD CRY STONE QL IR: 1.68 RATIO (ref 0–1.2)

## 2021-01-20 RX ORDER — POTASSIUM CITRATE 10 MEQ/1
10 TABLET, EXTENDED RELEASE ORAL 2 TIMES DAILY
Qty: 60 TABLET | Refills: 5 | Status: SHIPPED | OUTPATIENT
Start: 2021-01-20 | End: 2021-07-07

## 2021-01-21 ENCOUNTER — PROCEDURE VISIT (OUTPATIENT)
Dept: UROLOGY | Facility: CLINIC | Age: 76
End: 2021-01-21

## 2021-01-21 VITALS
WEIGHT: 155 LBS | HEART RATE: 94 BPM | DIASTOLIC BLOOD PRESSURE: 86 MMHG | BODY MASS INDEX: 27.46 KG/M2 | SYSTOLIC BLOOD PRESSURE: 128 MMHG | HEIGHT: 63 IN

## 2021-01-21 DIAGNOSIS — N20.0 NEPHROLITHIASIS: Primary | ICD-10-CM

## 2021-01-21 PROCEDURE — 99024 POSTOP FOLLOW-UP VISIT: CPT | Performed by: PHYSICIAN ASSISTANT

## 2021-01-21 PROCEDURE — 87086 URINE CULTURE/COLONY COUNT: CPT | Performed by: PHYSICIAN ASSISTANT

## 2021-01-21 RX ORDER — ASPIRIN 81 MG/1
81 TABLET, CHEWABLE ORAL DAILY
COMMUNITY

## 2021-01-21 NOTE — PROGRESS NOTES
1  Nephrolithiasis  Urine culture    Case request operating room: CYSTOSCOPY URETEROSCOPY WITH LITHOTRIPSY HOLMIUM LASER, RETROGRADE PYELOGRAM AND INSERTION STENT URETERAL    Case request operating room: CYSTOSCOPY URETEROSCOPY WITH LITHOTRIPSY HOLMIUM LASER, RETROGRADE PYELOGRAM AND INSERTION STENT URETERAL         Assessment and plan:       1  Nephrolithiasis - managed by Dr Eugenia Pedro  -status post right PCNL 12/07/2020  -I reviewed with the patient that her CT reveals residual stone fragments adjacent to her ureteral stent  Recommendations are to proceed with cystoscopy, right ureteroscopy, holmium laser, basket extraction, retrograde pyelogram, and right ureteral stent insertion  Reviewed the risks of the procedure she verbalized understanding  Urine specimen provided will be submitted for culture  She will be contacted if antibiotics needed  She is encouraged to contact us the meantime with any questions or concerns  All questions answered  [employer], PA-C      Chief Complaint     Chief Complaint   Patient presents with    Nephrolithiasis         History of Present Illness     Mary Barboza is a 76 y o  female patient of Dr Eugenia Pedro with a history of nephrolithiasis presenting for follow-up  Patient is a history recurrent nephrolithiasis  She had undergone a right-sided PCNL in February 2017  Recent imaging revealed a large 1 5 cm right lower pole calculus  Patient was having intermittent right CVA tenderness associated with this  Patient underwent right PCNL 12/07/2020  Her follow-up CT unfortunately reveals a stone fragment adjacent to her right ureteral stent  Patient has been tolerating her stent well  Denies any dysuria, gross hematuria, flank pain, nausea, vomiting, fevers, or chills        Laboratory     Lab Results   Component Value Date    CREATININE 1 10 01/07/2021       Review of Systems     Review of Systems   Constitutional: Negative for activity change, appetite change, chills, diaphoresis, fatigue, fever and unexpected weight change  Respiratory: Negative for chest tightness and shortness of breath  Cardiovascular: Negative for chest pain, palpitations and leg swelling  Gastrointestinal: Negative for abdominal distention, abdominal pain, constipation, diarrhea, nausea and vomiting  Genitourinary: Negative for decreased urine volume, difficulty urinating, dysuria, enuresis, flank pain, frequency, genital sores, hematuria and urgency  Musculoskeletal: Negative for back pain, gait problem and myalgias  Skin: Negative for color change, pallor, rash and wound  Psychiatric/Behavioral: Negative for behavioral problems  The patient is not nervous/anxious  Allergies     Allergies   Allergen Reactions    Other Other (See Comments)     "French food"- swollen tongue         Physical Exam     Physical Exam  Constitutional:       General: She is not in acute distress  Appearance: Normal appearance  She is normal weight  She is not ill-appearing, toxic-appearing or diaphoretic  HENT:      Head: Normocephalic and atraumatic  Eyes:      General:         Right eye: No discharge  Left eye: No discharge  Conjunctiva/sclera: Conjunctivae normal    Cardiovascular:      Rate and Rhythm: Normal rate and regular rhythm  Heart sounds: Normal heart sounds  No murmur  No friction rub  Pulmonary:      Effort: Pulmonary effort is normal  No respiratory distress  Breath sounds: Normal breath sounds  No stridor  Skin:     General: Skin is warm and dry  Coloration: Skin is not jaundiced or pale  Neurological:      General: No focal deficit present  Mental Status: She is alert and oriented to person, place, and time  Psychiatric:         Mood and Affect: Mood normal          Behavior: Behavior normal          Thought Content:  Thought content normal          Judgment: Judgment normal          Vital Signs     Vitals: 01/21/21 1128   BP: 128/86   Pulse: 94   Weight: 70 3 kg (155 lb)   Height: 5' 3" (1 6 m)         Current Medications       Current Outpatient Medications:     acetaminophen (TYLENOL) 325 mg tablet, Take 2 tablets (650 mg total) by mouth every 4 (four) hours as needed for mild pain, Disp: 30 tablet, Rfl: 0    Ascorbic Acid (VITAMIN C) 100 MG tablet, Take 1 tablet by mouth daily, Disp: , Rfl:     aspirin 81 mg chewable tablet, Chew 81 mg daily, Disp: , Rfl:     B Complex Vitamins (VITAMIN B COMPLEX) TABS, Take 1 tablet by mouth daily, Disp: , Rfl:     cholecalciferol (VITAMIN D3) 1,000 units tablet, Take 1 tablet by mouth daily 5000 mg, Disp: , Rfl:     Coenzyme Q10 (CO Q 10) 10 MG CAPS, Take 1 capsule by mouth daily, Disp: , Rfl:     Lutein 40 MG CAPS, Take by mouth, Disp: , Rfl:     Multiple Vitamins-Minerals (EYE VITAMINS & MINERALS PO), Take by mouth, Disp: , Rfl:     Multiple Vitamins-Minerals (MULTIVITAMIN ADULT PO), Take 1 tablet by mouth daily, Disp: , Rfl:     olmesartan (BENICAR) 40 mg tablet, Take 1 tablet (40 mg total) by mouth daily, Disp: 90 tablet, Rfl: 1    Omega-3 Fatty Acids (FISH OIL PO), Take 7,000 mg by mouth daily , Disp: , Rfl:     PARoxetine (PAXIL) 10 mg tablet, Take 1 tablet (10 mg total) by mouth daily, Disp: 30 tablet, Rfl: 5    pilocarpine (SALAGEN) 5 mg tablet, Take 5 mg by mouth 4 (four) times a day, Disp: , Rfl:     rosuvastatin (CRESTOR) 5 mg tablet, Take 1 tablet (5 mg total) by mouth daily, Disp: 30 tablet, Rfl: 5    CINNAMON PO, Take 1 capsule by mouth daily , Disp: , Rfl:     docusate sodium (COLACE) 100 mg capsule, Take 1 capsule (100 mg total) by mouth 2 (two) times a day for 15 days, Disp: 30 capsule, Rfl: 0    potassium citrate (Urocit-K 10) 10 mEq, Take 1 tablet (10 mEq total) by mouth 2 (two) times a day (Patient not taking: Reported on 1/21/2021), Disp: 60 tablet, Rfl: 5    sodium chloride, PF, 0 9 %, 10 mL by Intracatheter route every 12 (twelve) hours Intracatheter flushing daily (Patient not taking: Reported on 1/6/2021), Disp: 30 Syringe, Rfl: 3    tamsulosin (FLOMAX) 0 4 mg, TAKE 1 CAPSULE BY MOUTH EVERY DAY WITH DINNER (Patient not taking: Reported on 1/21/2021), Disp: 15 capsule, Rfl: 0  No current facility-administered medications for this visit       Facility-Administered Medications Ordered in Other Visits:     barium (READI-CAT 2) suspension 900 mL, 900 mL, Oral, Once in imaging, Mackenzie Vivar MD    iohexol (OMNIPAQUE) 350 MG/ML injection (SINGLE-DOSE) 100 mL, 100 mL, Intravenous, Once in imaging, Mackenzie Vivar MD      Active Problems     Patient Active Problem List   Diagnosis    Nephrolithiasis    Allergic rhinitis    Benign hypertension    Diverticulosis    Hiatal hernia    Hyperlipidemia    Depression    Impaired fasting glucose    Diverticulitis    Left shoulder tendonitis    Carpal tunnel syndrome of left wrist    Abdominal aortic aneurysm (AAA) 3 0 cm to 5 0 cm in diameter in female [facility])    Abdominal aortic aneurysm (AAA) without rupture ([address] )    Sjogren's syndrome with keratoconjunctivitis sicca ([address] )         Past Medical History     Past Medical History:   Diagnosis Date    Abdominal aortic aneurysm (AAA) ([address] )     Chronic kidney disease     CKD stage 3    Hyperlipidemia     Hypertension     Kidney stone     Kidney stones     Pneumonia     Sjogren's syndrome ([address] )          Surgical History     Past Surgical History:   Procedure Laterality Date    CATARACT EXTRACTION      DILATION AND CURETTAGE OF UTERUS      FL RETROGRADE PYELOGRAM  12/7/2020    IR NEPHROURETERAL ACCESS FOR UROLOGY PCNL  12/1/2020    JOINT REPLACEMENT      arm muscle & tendon surgery [facility]     CA CYSTO/URETERO/PYELOSCOPY, DX Right 2/15/2017    Procedure: URETEROSCOPY, STENT PLACEMENT ;  Surgeon: Mackenzie Vivar MD;  [facility];  Service: Urology    CA CARLOS Rogers CM Right 2/15/2017 Procedure: ACCESS INTERPOLAR CALYX, INSERTED TWO WIRES INTO BLADDER, NEPHROLITHOTOMY  PERCUTANEOUS ;  Surgeon: Sarah Zamudio MD;  [facility];  Service: Urology    GEORGE Rogers CM Right 12/7/2020    Procedure: NEPHROLITHOTOMY  PERCUTANEOUS (PCNL);   Surgeon: Sarah Zamudio MD;  [facility];  Service: Urology    SHOULDER SURGERY           Family History     Family History   Problem Relation Age of Onset    Cancer Mother     Colon cancer Mother 80        CARCINOMA     Melanoma Mother     Alzheimer's disease Father     Urinary tract infection Sister     Other Sister 39        urinary tract cancer    Cancer Brother         BLADDER    Stroke Maternal Grandfather         CVA    No Known Problems Daughter     No Known Problems Maternal Grandmother     No Known Problems Paternal Grandmother     No Known Problems Sister     No Known Problems Sister     No Known Problems Daughter     No Known Problems Daughter     Lung cancer Maternal Aunt 79    No Known Problems Paternal Aunt     No Known Problems Paternal Aunt          Social History     Social History       Radiology

## 2021-01-22 DIAGNOSIS — Z23 ENCOUNTER FOR IMMUNIZATION: ICD-10-CM

## 2021-01-23 LAB — BACTERIA UR CULT: NORMAL

## 2021-01-25 ENCOUNTER — TELEPHONE (OUTPATIENT)
Dept: UROLOGY | Facility: CLINIC | Age: 76
End: 2021-01-25

## 2021-01-25 ENCOUNTER — TELEPHONE (OUTPATIENT)
Dept: UROLOGY | Facility: AMBULATORY SURGERY CENTER | Age: 76
End: 2021-01-25

## 2021-01-25 NOTE — TELEPHONE ENCOUNTER
Spoke with patient and she is sched for 2/4 at [facility] with [address]  She is aware of surgical protocol, no PATs needed  She will contact us with any questions or concerns

## 2021-01-28 ENCOUNTER — TELEPHONE (OUTPATIENT)
Dept: NEPHROLOGY | Facility: CLINIC | Age: 76
End: 2021-01-28

## 2021-01-28 NOTE — TELEPHONE ENCOUNTER
Spoke with pt regarding her 24 hr urine collection  She has been made aware of the results  Pt will initiate k-citrate 10 mEq bid and repeat studies in 2 mos  Pt has been scheduled for f/u on 4/26 at our [facility] office  Lab slips have been mailed to pt home address  She has verbalized understanding and has no questions or concerns at this time

## 2021-01-28 NOTE — TELEPHONE ENCOUNTER
----- Message from Elizabeth sent at 1/28/2021  9:18 AM EST -----    ----- Message -----  From: Kt Corona MD  Sent: 1/20/2021   7:45 PM EST  To: Elizabeth    Please call her and tell her that the urine collection showed very low urine citrate and that is her problem and why she forms stones  Her urine calcium and oxalate were normal  To treat I will send in a medicine called potassium citrateto pharmacy  Start one twice a day and then we will check another collection and a BMP in 2 months on med and then see her back in April  I put in orders for labs and mail to her, put her on call back for April and tell her I sent in script   Thanks   ----- Message -----  From: Lab, Background User  Sent: 1/20/2021   4:05 PM EST  To: Kt Corona MD

## 2021-02-03 ENCOUNTER — ANESTHESIA EVENT (OUTPATIENT)
Dept: PERIOP | Facility: HOSPITAL | Age: 76
DRG: 856 | End: 2021-02-03
Payer: MEDICARE

## 2021-02-03 NOTE — PRE-PROCEDURE INSTRUCTIONS
Pre-Surgery Instructions:   Medication Instructions    acetaminophen (TYLENOL) 325 mg tablet Take as needed    Ascorbic Acid (VITAMIN C) 100 MG tablet Hold prior to surgery    aspirin 81 mg chewable tablet Hold prior to surgery    B Complex Vitamins (VITAMIN B COMPLEX) TABS Hold prior to surgery    Coenzyme Q10 (CO Q 10) 10 MG CAPS Hold prior to surgery    Multiple Vitamins-Minerals (EYE VITAMINS & MINERALS PO) Hold prior to surgery    Multiple Vitamins-Minerals (MULTIVITAMIN ADULT PO) Hold prior to surgery    olmesartan (BENICAR) 40 mg tablet Hold morning of surgery    Omega-3 Fatty Acids (FISH OIL PO) Hold prior to surgery    PARoxetine (PAXIL) 10 mg tablet Take as directed    pilocarpine (SALAGEN) 5 mg tablet Take as directed    potassium citrate (Urocit-K 10) 10 mEq Hold prior to surgery    rosuvastatin (CRESTOR) 5 mg tablet Take as directed      My Surgical Experience    The following information was developed to assist you to prepare for your operation  What do I need to do before coming to the hospital?   Arrange for a responsible person to drive you to and from the hospital    Arrange care for your children at home  Children are not allowed in the recovery areas of the hospital   Plan to wear clothing that is easy to put on and take off  If you are having shoulder surgery, wear a shirt that buttons or zippers in the front  Bathing  o Shower the evening before and the morning of your surgery with an antibacterial soap  Please refer to the Pre Op Showering Instructions for Surgery Patients Sheet   o Remove nail polish and all body piercing jewelry  o Do not shave any body part for at least 24 hours before surgery-this includes face, arms, legs and upper body  Food  o Nothing to eat or drink after midnight the night before your surgery   This includes candy and chewing gum  o Exception: If your surgery is after 12:00pm (noon), you may have clear liquids such as 7-Up®, ginger ale, apple or cranberry juice, Jell-O®, water, or clear broth until 8:00 am  o Do not drink milk or juice with pulp on the morning before surgery  o Do not drink alcohol 24 hours before surgery  Medicine  o Follow instructions you received from your surgeon about which medicines you may take on the day of surgery  o If instructed to take medicine on the morning of surgery, take pills with just a small sip of water  Call your prescribing doctor for specific infroamtion on what to do if you take insulin    What should I bring to the hospital?    Bring:  Jillian Side or a walker, if you have them, for foot or knee surgery   A list of the daily medicines, vitamins, minerals, herbals and nutritional supplements you take  Include the dosages of medicines and the time you take them each day   Glasses, dentures or hearing aids   Minimal clothing; you will be wearing hospital sleepwear   Photo ID; required to verify your identity   If you have a Living Will or Power of , bring a copy of the documents   If you have an ostomy, bring an extra pouch and any supplies you use    Do not bring   Medicines or inhalers   Money, valuables or jewelry    What other information should I know about the day of surgery?  Notify your surgeons if you develop a cold, sore throat, cough, fever, rash or any other illness   Report to the Ambulatory Surgical/Same Day Surgery Unit   You will be instructed to stop at Registration only if you have not been pre-registered   Inform your  fi they do not stay that they will be asked by the staff to leave a phone number where they can be reached   Be available to be reached before surgery   In the event the operating room schedule changes, you may be asked to come in earlier or later than expected    *It is important to tell your doctor and others involved in your health care if you are taking or have been taking any non-prescription drugs, vitamins, minerals, herbals or other nutritional supplements   Any of these may interact with some food or medicines and cause a reaction

## 2021-02-04 ENCOUNTER — TELEPHONE (OUTPATIENT)
Dept: UROLOGY | Facility: CLINIC | Age: 76
End: 2021-02-04

## 2021-02-04 ENCOUNTER — HOSPITAL ENCOUNTER (OUTPATIENT)
Facility: HOSPITAL | Age: 76
Setting detail: OUTPATIENT SURGERY
Discharge: HOME | DRG: 856 | End: 2021-02-04
Attending: UROLOGY | Admitting: UROLOGY
Payer: MEDICARE

## 2021-02-04 ENCOUNTER — APPOINTMENT (OUTPATIENT)
Dept: RADIOLOGY | Facility: HOSPITAL | Age: 76
DRG: 856 | End: 2021-02-04
Payer: MEDICARE

## 2021-02-04 ENCOUNTER — ANESTHESIA (OUTPATIENT)
Dept: PERIOP | Facility: HOSPITAL | Age: 76
DRG: 856 | End: 2021-02-04
Payer: MEDICARE

## 2021-02-04 VITALS
SYSTOLIC BLOOD PRESSURE: 159 MMHG | OXYGEN SATURATION: 99 % | BODY MASS INDEX: 27.07 KG/M2 | RESPIRATION RATE: 20 BRPM | HEIGHT: 63 IN | WEIGHT: 152.78 LBS | TEMPERATURE: 98.6 F | DIASTOLIC BLOOD PRESSURE: 82 MMHG | HEART RATE: 90 BPM

## 2021-02-04 VITALS — HEART RATE: 87 BPM

## 2021-02-04 DIAGNOSIS — N20.0 NEPHROLITHIASIS: Primary | ICD-10-CM

## 2021-02-04 PROBLEM — D64.9 ANEMIA: Status: ACTIVE | Noted: 2021-02-04

## 2021-02-04 PROCEDURE — C1758 CATHETER, URETERAL: HCPCS | Performed by: UROLOGY

## 2021-02-04 PROCEDURE — 0T768DZ DILATION OF RIGHT URETER WITH INTRALUMINAL DEVICE, VIA NATURAL OR ARTIFICIAL OPENING ENDOSCOPIC: ICD-10-PCS | Performed by: UROLOGY

## 2021-02-04 PROCEDURE — 52356 CYSTO/URETERO W/LITHOTRIPSY: CPT | Performed by: UROLOGY

## 2021-02-04 PROCEDURE — C2617 STENT, NON-COR, TEM W/O DEL: HCPCS | Performed by: UROLOGY

## 2021-02-04 PROCEDURE — 74420 UROGRAPHY RTRGR +-KUB: CPT

## 2021-02-04 PROCEDURE — 0TC08ZZ EXTIRPATION OF MATTER FROM RIGHT KIDNEY, VIA NATURAL OR ARTIFICIAL OPENING ENDOSCOPIC: ICD-10-PCS | Performed by: UROLOGY

## 2021-02-04 PROCEDURE — C1769 GUIDE WIRE: HCPCS | Performed by: UROLOGY

## 2021-02-04 PROCEDURE — NC001 PR NO CHARGE: Performed by: UROLOGY

## 2021-02-04 PROCEDURE — 82360 CALCULUS ASSAY QUANT: CPT | Performed by: UROLOGY

## 2021-02-04 DEVICE — INLAY OPTIMA URETERAL STENT W/O GUIDEWIRE
Type: IMPLANTABLE DEVICE | Site: BLADDER | Status: FUNCTIONAL
Brand: BARD® INLAY OPTIMA® URETERAL STENT

## 2021-02-04 RX ORDER — TAMSULOSIN HYDROCHLORIDE 0.4 MG/1
0.4 CAPSULE ORAL
Qty: 15 CAPSULE | Refills: 0 | Status: SHIPPED | OUTPATIENT
Start: 2021-02-04 | End: 2021-02-11

## 2021-02-04 RX ORDER — PROPOFOL 10 MG/ML
INJECTION, EMULSION INTRAVENOUS AS NEEDED
Status: DISCONTINUED | OUTPATIENT
Start: 2021-02-04 | End: 2021-02-04

## 2021-02-04 RX ORDER — CEPHALEXIN 500 MG/1
500 CAPSULE ORAL EVERY 6 HOURS SCHEDULED
Qty: 3 CAPSULE | Refills: 0 | Status: SHIPPED | OUTPATIENT
Start: 2021-02-04 | End: 2021-02-10 | Stop reason: HOSPADM

## 2021-02-04 RX ORDER — EPHEDRINE SULFATE 50 MG/ML
INJECTION INTRAVENOUS AS NEEDED
Status: DISCONTINUED | OUTPATIENT
Start: 2021-02-04 | End: 2021-02-04

## 2021-02-04 RX ORDER — FENTANYL CITRATE/PF 50 MCG/ML
25 SYRINGE (ML) INJECTION
Status: DISCONTINUED | OUTPATIENT
Start: 2021-02-04 | End: 2021-02-04 | Stop reason: HOSPADM

## 2021-02-04 RX ORDER — DOCUSATE SODIUM 100 MG/1
100 CAPSULE, LIQUID FILLED ORAL 2 TIMES DAILY
Qty: 30 CAPSULE | Refills: 0 | Status: SHIPPED | OUTPATIENT
Start: 2021-02-04 | End: 2021-02-10 | Stop reason: HOSPADM

## 2021-02-04 RX ORDER — DEXAMETHASONE SODIUM PHOSPHATE 4 MG/ML
4 INJECTION, SOLUTION INTRA-ARTICULAR; INTRALESIONAL; INTRAMUSCULAR; INTRAVENOUS; SOFT TISSUE ONCE
Status: DISCONTINUED | OUTPATIENT
Start: 2021-02-04 | End: 2021-02-04 | Stop reason: HOSPADM

## 2021-02-04 RX ORDER — NAPROXEN 500 MG/1
500 TABLET ORAL 2 TIMES DAILY WITH MEALS
Qty: 10 TABLET | Refills: 0 | Status: SHIPPED | OUTPATIENT
Start: 2021-02-04 | End: 2021-02-10 | Stop reason: HOSPADM

## 2021-02-04 RX ORDER — MAGNESIUM HYDROXIDE 1200 MG/15ML
LIQUID ORAL AS NEEDED
Status: DISCONTINUED | OUTPATIENT
Start: 2021-02-04 | End: 2021-02-04 | Stop reason: HOSPADM

## 2021-02-04 RX ORDER — PHENAZOPYRIDINE HYDROCHLORIDE 200 MG/1
200 TABLET, FILM COATED ORAL 3 TIMES DAILY PRN
Qty: 10 TABLET | Refills: 0 | Status: SHIPPED | OUTPATIENT
Start: 2021-02-04 | End: 2021-02-10 | Stop reason: HOSPADM

## 2021-02-04 RX ORDER — SODIUM CHLORIDE, SODIUM LACTATE, POTASSIUM CHLORIDE, CALCIUM CHLORIDE 600; 310; 30; 20 MG/100ML; MG/100ML; MG/100ML; MG/100ML
INJECTION, SOLUTION INTRAVENOUS CONTINUOUS PRN
Status: DISCONTINUED | OUTPATIENT
Start: 2021-02-04 | End: 2021-02-04

## 2021-02-04 RX ORDER — ACETAMINOPHEN 325 MG/1
650 TABLET ORAL EVERY 4 HOURS PRN
Qty: 30 TABLET | Refills: 0
Start: 2021-02-04 | End: 2021-02-10 | Stop reason: HOSPADM

## 2021-02-04 RX ORDER — LIDOCAINE HYDROCHLORIDE 20 MG/ML
INJECTION, SOLUTION EPIDURAL; INFILTRATION; INTRACAUDAL; PERINEURAL AS NEEDED
Status: DISCONTINUED | OUTPATIENT
Start: 2021-02-04 | End: 2021-02-04

## 2021-02-04 RX ORDER — CEFAZOLIN SODIUM 1 G/50ML
1000 SOLUTION INTRAVENOUS ONCE
Status: COMPLETED | OUTPATIENT
Start: 2021-02-04 | End: 2021-02-04

## 2021-02-04 RX ORDER — MIDAZOLAM HYDROCHLORIDE 2 MG/2ML
INJECTION, SOLUTION INTRAMUSCULAR; INTRAVENOUS AS NEEDED
Status: DISCONTINUED | OUTPATIENT
Start: 2021-02-04 | End: 2021-02-04

## 2021-02-04 RX ORDER — FENTANYL CITRATE 50 UG/ML
INJECTION, SOLUTION INTRAMUSCULAR; INTRAVENOUS AS NEEDED
Status: DISCONTINUED | OUTPATIENT
Start: 2021-02-04 | End: 2021-02-04

## 2021-02-04 RX ORDER — OXYCODONE HYDROCHLORIDE 5 MG/1
5 TABLET ORAL EVERY 4 HOURS PRN
Qty: 5 TABLET | Refills: 0 | Status: SHIPPED | OUTPATIENT
Start: 2021-02-04 | End: 2021-02-10 | Stop reason: HOSPADM

## 2021-02-04 RX ORDER — SODIUM CHLORIDE, SODIUM LACTATE, POTASSIUM CHLORIDE, CALCIUM CHLORIDE 600; 310; 30; 20 MG/100ML; MG/100ML; MG/100ML; MG/100ML
125 INJECTION, SOLUTION INTRAVENOUS CONTINUOUS
Status: CANCELLED | OUTPATIENT
Start: 2021-02-04

## 2021-02-04 RX ADMIN — PHENYLEPHRINE HYDROCHLORIDE 50 MCG: 10 INJECTION INTRAVENOUS at 10:26

## 2021-02-04 RX ADMIN — PHENYLEPHRINE HYDROCHLORIDE 50 MCG: 10 INJECTION INTRAVENOUS at 10:32

## 2021-02-04 RX ADMIN — FENTANYL CITRATE 25 MCG: 50 INJECTION, SOLUTION INTRAMUSCULAR; INTRAVENOUS at 10:48

## 2021-02-04 RX ADMIN — EPHEDRINE SULFATE 10 MG: 50 INJECTION INTRAVENOUS at 10:22

## 2021-02-04 RX ADMIN — MIDAZOLAM HYDROCHLORIDE 1 MG: 1 INJECTION, SOLUTION INTRAMUSCULAR; INTRAVENOUS at 10:12

## 2021-02-04 RX ADMIN — FENTANYL CITRATE 25 MCG: 50 INJECTION, SOLUTION INTRAMUSCULAR; INTRAVENOUS at 10:58

## 2021-02-04 RX ADMIN — FENTANYL CITRATE 50 MCG: 50 INJECTION, SOLUTION INTRAMUSCULAR; INTRAVENOUS at 10:12

## 2021-02-04 RX ADMIN — LIDOCAINE HYDROCHLORIDE 80 MG: 20 INJECTION, SOLUTION EPIDURAL; INFILTRATION; INTRACAUDAL; PERINEURAL at 10:13

## 2021-02-04 RX ADMIN — MIDAZOLAM HYDROCHLORIDE 1 MG: 1 INJECTION, SOLUTION INTRAMUSCULAR; INTRAVENOUS at 10:10

## 2021-02-04 RX ADMIN — CEFAZOLIN SODIUM 1000 MG: 1 SOLUTION INTRAVENOUS at 10:06

## 2021-02-04 RX ADMIN — PROPOFOL 150 MG: 10 INJECTION, EMULSION INTRAVENOUS at 10:15

## 2021-02-04 RX ADMIN — SODIUM CHLORIDE, SODIUM LACTATE, POTASSIUM CHLORIDE, AND CALCIUM CHLORIDE: .6; .31; .03; .02 INJECTION, SOLUTION INTRAVENOUS at 10:05

## 2021-02-04 RX ADMIN — PHENYLEPHRINE HYDROCHLORIDE 100 MCG: 10 INJECTION INTRAVENOUS at 10:50

## 2021-02-04 NOTE — OP NOTE
Operative Note     PATIENT:  Matthew Villanueva (MRN [id number])    DATE OF PROCEDURE:   2/4/2021    PRE-OP DIAGNOSIS:   1) history of right intrarenal calculi measuring greater than 2 cm, now status post percutaneous nephrolithotomy  2) right ureteral stone fragment  3) right intrarenal stone fragments  4  Indwelling right ureteral stent    POST-OP DIAGNOSIS:   1) history of right intrarenal calculi measuring greater than 2 cm, now status post percutaneous nephrolithotomy  2) right ureteral stone fragment  3) right intrarenal stone fragments  4  Indwelling right ureteral stent    PROCEDURES PERFORMED:  1) Cystoscopy  2) Right retrograde pyelography with fluoroscopic interpretation  3) Right ureteroscopy with laser lithotripsy and basket extraction of stone  4) Right ureteral stent placement     Specimen(s):  ID Type Source Tests Collected by Time Destination   A : right ureteral stone Calculus Ureter, Right STONE ANALYSIS Manuel Blanco MD 2/4/2021 1033    B : right renal stone Calculus Kidney, Right STONE ANALYSIS Manuel Blanco MD 2/4/2021 1056        SURGEON:  Manuel Blanco MD    NOTE:  There were no qualified teaching residents to assist with this case    ANESTHESIA: General     COMPLICATIONS:   None    ANTIBIOTICS:  Cefazolin    INTRAOPERATIVE THROMBOEMBOLISM PROPHYLAXIS:  Pneumatic compression stockings     FINDINGS:  - the right proximal ureteral calculi were somewhat impacted within the ureter  Genoveva Zhu was removed completely using combination of holmium laser lithotripsy as well as basket extraction  -ureteroscopic evaluation of the proximal ureter, UPJ, as well as the pelvis and all calyceal systems was then carried out    - small lower pole fragments were completely evacuated using basket extraction and the patient was completely stone free at the conclusion of the case  -due to the persistent hydronephrosis noted on her preoperative imaging as well as intraoperative retrograde pyelogram, I did elect to leave a stent in place for anticipated duration of 2 weeks time    INDICATIONS FOR PROCEDURE:  Silviano Bernard is an 76 y o  old female with complex stone disease  She is 2 months status post percutaneous nephrolithotomy for a greater than 2 cm collection of right intrarenal stones  Postoperative imaging does reveal residual fragments within the ureter and lower pole of the kidney  In addition there is persistent hydronephrosis despite her indwelling ureteral stent  After discussing the options, the patient elected to undergo ureteroscopy and ureteral stent placement  We discussed the procedure in detail, the alternatives, and the risks, and they signed informed consent to proceed  PROCEDURE IN DETAIL:   The patient was identified and brought to the OR  Antibiotic prophylaxis and DVT prophylaxis were administered  They were placed in the comfortable dorsal lithotomy position with care to pad all pressure points  They were prepped and draped in the usual sterile fashion using hibiclens  A surgical time out was performed with all in the room in agreement with the correct patient, procedure, indications, and laterality  A 21-Uzbek rigid cystoscope was used to enter the bladder  The bladder was inspected in its entirety and there were no lesions noted  The ureteral orifices were identified in their normal orthotopic positions  The Right ureteral orifice was identified and a 5 Fr open ended catheter was placed into the ureteral orifice alongside the preplaced ureteral stent which was then removed  A retrograde pyelogram was performed with the findings as described above  A Sensor wire was advanced up to the kidney under fluoroscopic guidance  Leaving this safety wire in place, the bladder was drained  A second working wire was then placed, and over this a 12/14 ureteral access sheath was sequentially passed under fluoroscopic guidance    A  5 3 Uzbek flexible ureteroscope was advanced up the ureter under vision   The stone was encountered in the proximal ureter  The stone was noted to be impacted  A holmium laser fiber was passed through the ureteroscope and laser lithotripsy was commenced at settings of 0 7 J and 7 Hz  The stones were fragmented to very small pieces  Once we were satisfied that the stone was fragmented to dust, a 1 9 [employer] zero tip nitinol basket was passed through the ureteroscope  The residual fragments were basketed out and removed  Following this I then shows to evaluate the upper tract and also to address the remaining small fragments within the right kidney  This is accomplished by placing a 2nd wire under vision and over this a 12/14 DNA13 ureteral access sheath  I then introduced a 5 3 [employer] flexible ureteral scope through this  The ureteropelvic junction was evaluated and was patent in appearance at this point  I then evaluated the pelvis and all calyceal systems using a combination of real-time fluoroscopy and video imaging    A small collection of stones is visualized in the lower pole of the kidney  Each of these are systematically basket extracted  I confirmed the patient was completely stone free prior to terminating the procedure  The ureteroscope was backed down the ureter under vision and there were no residual fragments and the ureter was noted to be intact with no injury and moderate edema where the stone was located  A JJ stent was then passed up the wire  under fluoroscopic guidance into the kidney with a good curl noted in the kidney and in the bladder    The bladder was drained  The patient was placed back supine, awakened from general anesthesia and brought to recovery room in stable condition        ESTIMATED BLOOD LOSS:  Minimal      SPECIMENS:   Order Name Source Comment Collection Info Order Time   STONE ANALYSIS Ureter, Right Right ureteral stone Collected By: Jessenia Saini MD 2/4/2021 10:33 AM   STONE ANALYSIS Kidney, Right Right renal stone Collected By: Elijah Garcia MD 2/4/2021 10:57 AM        IMPLANTS:   Implant Name Type Inv   Item Serial No   Lot No  LRB No  Used Action   STENT URETERAL 6FR 22CML SOFT PU OPTIMA - [id number]  STENT URETERAL 6FR 22CML SOFT PU OPTIMA  [address] [id number] Right 1 Implanted        COMPLICATIONS: None    DISPOSITION: PACU    PLAN:  Patient will return for cystoscopy and ureteral stent removal in approximately 2 weeks and subsequently a KUB and renal ultrasound 6 weeks thereafter

## 2021-02-04 NOTE — ANESTHESIA PREPROCEDURE EVALUATION
Procedure:  CYSTOSCOPY URETEROSCOPY WITH LITHOTRIPSY HOLMIUM LASER, RETROGRADE PYELOGRAM AND INSERTION STENT URETERAL (Right Bladder)    Relevant Problems   CARDIO   (+) Abdominal aortic aneurysm (AAA) 3 0 cm to 5 0 cm in diameter in female [facility])   (+) Abdominal aortic aneurysm (AAA) without rupture (HCC)   (+) Benign hypertension   (+) Hyperlipidemia      GI/HEPATIC   (+) Hiatal hernia      /RENAL   (+) Nephrolithiasis      HEMATOLOGY   (+) Anemia      NEURO/PSYCH   (+) Depression      Other   (+) Sjogren's syndrome with keratoconjunctivitis sicca (HCC)        Physical Exam    Airway    Mallampati score: III  TM Distance: >3 FB  Neck ROM: full     Dental   Comment: Denies loose teeth,     Cardiovascular  Cardiovascular exam normal    Pulmonary  Pulmonary exam normal     Other Findings  Portions of exam deferred due to low yield and/or unknown COVID status      Anesthesia Plan  ASA Score- 3     Anesthesia Type- general with ASA Monitors  Additional Monitors:   Airway Plan:           Plan Factors-Exercise tolerance (METS): >4 METS  Chart reviewed  Existing labs reviewed  Patient summary reviewed  Patient is not a current smoker  Induction- intravenous  Postoperative Plan-     Informed Consent- Anesthetic plan and risks discussed with patient  I personally reviewed this patient with the CRNA  Discussed and agreed on the Anesthesia Plan with the CRNA  Reji Vargas

## 2021-02-04 NOTE — ANESTHESIA POSTPROCEDURE EVALUATION
Post-Op Assessment Note    CV Status:  Stable  Pain Score: 0    Pain management: adequate     Mental Status:  Sleepy   Hydration Status:  Stable   PONV Controlled:  Controlled   Airway Patency:  Patent      Post Op Vitals Reviewed: Yes      Staff: CRNA         No complications documented      BP (P) 157/76 (02/04/21 1115)    Temp (P) 99 4 °F (37 4 °C) (02/04/21 1115)    Pulse (P) 84 (02/04/21 1115)   Resp (P) 12 (02/04/21 1115)    SpO2   98% 3LFM

## 2021-02-04 NOTE — H&P
UROLOGY HISTORY AND PHYSICAL     Patient Identifiers: Steve Carrel (MRN [id number])      Date of Service: 2/4/2021        ASSESSMENT:     76 y o  old female with  complex nephrolithiasis  She is status post percutaneous nephrolithotomy performed on December 8  That procedure goal was notable for the observation of the previously placed access by Interventional Radiology which appeared to enter the renal pelvis directly and not a calyx  As such maneuvering within the kidney and distal ureter was markedly difficult  Patient did well clinically  Her postoperative imaging does reveal a 1 cm fragment at the level of the proximal ureter as well as tiny stones within the lower pole of the right kidney  Also noted is persistent hydronephrosis  I recommend returning to the operating room for ureteroscopic management while the stent remains in place  It has been 8 weeks inserted percutaneous nephrolithotomy and as such this appears an appropriate interval to plan her second-stage retrograde ureteroscopic procedure  Discussed options for management of the patient's ureteral stone  We discussed surgical options including ureteroscopy and shock wave lithotripsy  In addition we discussed conservative management with medical expulsive therapy  The patient has elected to undergo ureteroscopy  I discussed with the patients risks and benefits and alternatives to ureteroscopy with laser lithotripsy  The risks include bleeding, infection, injury to the urethra, bladder, ureter or kidney, risk of a staged procedure, risk of stricture, risk of residual fragments, risk of loss of kidney, risks of anesthesia including DVT, PE, MI and death  The patient understands that a ureteral stent will likely be left in place at the time of the procedure  We reviewed the expected postoperative care  The patient understands these risks and has provided informed consent for RIGHT ureteroscopy      PLAN:     To OR for right ureteroscopy, ureteral stent exchange      History of Present Illness:     Lam Woody is a 76 y o  old with a history of complex nephrolithiasis status post percutaneous nephrolithotomy with residual stone fragments presents for ureteroscopic management    Past Medical, Past Surgical History:     Past Medical History:   Diagnosis Date    Abdominal aortic aneurysm (AAA) ([facility] [address] )     Hyperlipidemia     Hypertension     Kidney stone     Kidney stones     Pneumonia     Sjogren's syndrome ([facility] [address] )    :    Past Surgical History:   Procedure Laterality Date    CATARACT EXTRACTION      DILATION AND CURETTAGE OF UTERUS      FL RETROGRADE PYELOGRAM  2020    IR NEPHROURETERAL ACCESS FOR UROLOGY PCNL  2020    NE CYSTO/URETERO/PYELOSCOPY, DX Right 2/15/2017    Procedure: URETEROSCOPY, STENT PLACEMENT ;  Surgeon: Alise Thomas MD;  [facility];  Service: Urology    NE WILFRIDUT Ken CM Right 2/15/2017    Procedure: ACCESS INTERPOLAR CALYX, INSERTED TWO WIRES INTO BLADDER, NEPHROLITHOTOMY  PERCUTANEOUS ;  Surgeon: Alies Thomas MD;  [facility];  Service: Urology    NE PERCUT Ken CM Right 2020    Procedure: NEPHROLITHOTOMY  PERCUTANEOUS (PCNL); Surgeon: Alise Thomas MD;  [facility];  Service: Urology    SHOULDER SURGERY     :    Medications, Allergies:     Current Facility-Administered Medications:     ceFAZolin (ANCEF) IVPB (premix in dextrose) 1,000 mg 50 mL, 1,000 mg, Intravenous, Once, nAita Arevalo PA-C    Allergies:   Allergies   Allergen Reactions    Other Other (See Comments)     "Yoruba food"- swollen tongue     :    Social and Family History:   Social History:   Social History     Tobacco Use    Smoking status: Former Smoker     Packs/day: 1 00     Years: 20 00     Pack years: 20 00     Quit date:      Years since quittin 1    Smokeless tobacco: Former User     Quit date:     Tobacco comment: smokes "very rarely"   Substance Use Topics    Alcohol use: Yes     Frequency: Monthly or less     Drinks per session: 1 or 2    Drug use: No        Social History     Tobacco Use   Smoking Status Former Smoker    Packs/day:  00    Years: 20 00    Pack years: 20 00    Quit date:     Years since quittin 1   Smokeless Tobacco Former User    Quit date:    Tobacco Comment    smokes "very rarely"       Family History:  Family History   Problem Relation Age of Onset    Cancer Mother     Colon cancer Mother 80        CARCINOMA     Melanoma Mother     Alzheimer's disease Father     Urinary tract infection Sister     Other Sister 39        urinary tract cancer    Cancer Brother         BLADDER    Stroke Maternal Grandfather         CVA    No Known Problems Daughter     No Known Problems Maternal Grandmother     No Known Problems Paternal Grandmother     No Known Problems Sister     No Known Problems Sister     No Known Problems Daughter     No Known Problems Daughter     Lung cancer Maternal Aunt 79    No Known Problems Paternal Aunt     No Known Problems Paternal Aunt    :     Review of Systems:     General: Fever, chills, or night sweats: negative  Cardiac: Negative for chest pain  Pulmonary: Negative for shortness of breath  Gastrointestinal: Abdominal pain negative  Nausea, vomiting, or diarrhea negative  Genitourinary: See HPI above  Patient does nothave hematuria  All other systems queried were negative  Physical Exam:   General: Patient is pleasant and in NAD  Awake and alert  /78   Pulse 92   Resp 16   Ht 5' 3" (1 6 m)   Wt 69 3 kg (152 lb 12 5 oz)   SpO2 97%   BMI 27 06 kg/m²   Cardiac: Peripheral edema: negative  Pulmonary: Non-labored breathing  Abdomen: Soft, non-tender, non-distended  No surgical scars  No masses, tenderness, hernias noted  Genitourinary: negative CVA tenderness, negative suprapubic tenderness        Labs:     Lab Results Component Value Date    HGB 10 3 (L) 12/08/2020    HCT 31 9 (L) 12/08/2020    WBC 7 92 12/08/2020     12/08/2020   ]    Lab Results   Component Value Date    K 3 7 12/08/2020     12/08/2020    CO2 28 12/08/2020    BUN 11 12/08/2020    CREATININE 1 10 01/07/2021    CALCIUM 8 7 12/08/2020   ]    Imaging:   I personally reviewed the images and report of the following studies, and reviewed them with the patient:        Thank you for allowing me to participate in this patients care  Please do not hesitate to call with any additional questions    Sandrita Villasenor MD

## 2021-02-04 NOTE — TELEPHONE ENCOUNTER
Status post ureteroscopy    Please schedule cystoscopy with stent removal with advanced practitioner in 2-3 weeks    Following that she will need a postoperative KUB and renal ultrasound in 6 weeks time

## 2021-02-04 NOTE — DISCHARGE INSTRUCTIONS
Mrs José Miguel Cortés: Your surgery went very well  Using a series of flexible endoscopes, I was able to remove the stones within your ureter as well as some tiny fragments still within the kidney  You are now completely stone free    The stone within your kidney tube was somewhat impacted (stuck)  As such I will plan to leave your stent in place for approximately 2-3 weeks to allow everything to heal     I will schedule you for a follow-up minor procedure with my advanced practitioner to remove the stent in our office at that time and we will then plan to monitor your kidney with an x-ray and ultrasound 6 weeks thereafter    Please take your medications as prescribed with caution for comfort  Most importantly please drink plenty of fluids  Please call with any questions or concerns  Mandy Goodman MD,PhD  [facility]  [phone]    WHAT IS A STENT? At the end of the procedure, your doctor may place a stent into your ureter  A stent is a thin, flexible piece of plastic that will hold open your ureter while the remaining small pieces of stone pass  This allows your kidney to drain easily and prevents you from having to pass these small stone pieces on your own, which could be painful  The stent is about 12 inches long and looks and feels like a thin piece of spaghetti  AFTER THE PROCEDURE  After the procedure you may experience the following symptoms  All of these are normal and should resolve within 1 or 2 days after your stent is removed  1) Urinary frequency (urinating more often than usual)  2) Urinary urgency (the sensation that you need to urinate right away)  3) Painful urination (this can be pain in your bladder or in your back when  you urinate)  4) Blood in your urine ( a stent can irritate the lining of your bladder causing it to bleed)  5) Back/Flank pain, especially with urination  You will receive a prescription for narcotic pain medication after the procedure   You will also receive a prescription for tamsulosin which you will take once a day for 2 weeks to help relax your ureter and decrease stent discomfort  You will also need to purchase a stool softener (i e  Colace) or mild laxative (i e  Miralax) as the narcotic pain medication can make you constipated  This is important as constipation can exacerbate stent related symptoms

## 2021-02-05 ENCOUNTER — APPOINTMENT (EMERGENCY)
Dept: CT IMAGING | Facility: HOSPITAL | Age: 76
DRG: 856 | End: 2021-02-05
Payer: MEDICARE

## 2021-02-05 ENCOUNTER — HOSPITAL ENCOUNTER (INPATIENT)
Facility: HOSPITAL | Age: 76
LOS: 5 days | Discharge: HOME | DRG: 856 | End: 2021-02-10
Attending: EMERGENCY MEDICINE | Admitting: INTERNAL MEDICINE
Payer: MEDICARE

## 2021-02-05 DIAGNOSIS — R65.20 SEVERE SEPSIS (HCC): Primary | ICD-10-CM

## 2021-02-05 DIAGNOSIS — K62.5 RECTAL BLEEDING: ICD-10-CM

## 2021-02-05 DIAGNOSIS — A41.9 SEVERE SEPSIS (HCC): Primary | ICD-10-CM

## 2021-02-05 DIAGNOSIS — K92.2 ACUTE GI BLEEDING: ICD-10-CM

## 2021-02-05 DIAGNOSIS — N20.0 NEPHROLITHIASIS: ICD-10-CM

## 2021-02-05 DIAGNOSIS — N17.9 ACUTE KIDNEY INJURY (HCC): ICD-10-CM

## 2021-02-05 DIAGNOSIS — N10 ACUTE PYELITIS: ICD-10-CM

## 2021-02-05 DIAGNOSIS — R74.01 TRANSAMINITIS: ICD-10-CM

## 2021-02-05 DIAGNOSIS — D64.9 ANEMIA, UNSPECIFIED TYPE: ICD-10-CM

## 2021-02-05 PROBLEM — N39.0 UTI (URINARY TRACT INFECTION): Status: ACTIVE | Noted: 2021-02-05

## 2021-02-05 LAB
ALBUMIN SERPL BCP-MCNC: 2.9 G/DL (ref 3.5–5)
ALP SERPL-CCNC: 94 U/L (ref 46–116)
ALT SERPL W P-5'-P-CCNC: 115 U/L (ref 12–78)
ANION GAP SERPL CALCULATED.3IONS-SCNC: 10 MMOL/L (ref 4–13)
APTT PPP: 31 SECONDS (ref 23–37)
AST SERPL W P-5'-P-CCNC: 155 U/L (ref 5–45)
BACTERIA UR QL AUTO: ABNORMAL /HPF
BASOPHILS # BLD MANUAL: 0.19 THOUSAND/UL (ref 0–0.1)
BASOPHILS NFR MAR MANUAL: 1 % (ref 0–1)
BILIRUB SERPL-MCNC: 0.6 MG/DL (ref 0.2–1)
BILIRUB UR QL STRIP: ABNORMAL
BUN SERPL-MCNC: 35 MG/DL (ref 5–25)
CALCIUM ALBUM COR SERPL-MCNC: 11.1 MG/DL (ref 8.3–10.1)
CALCIUM SERPL-MCNC: 10.2 MG/DL (ref 8.3–10.1)
CHLORIDE SERPL-SCNC: 101 MMOL/L (ref 100–108)
CLARITY UR: ABNORMAL
CO2 SERPL-SCNC: 26 MMOL/L (ref 21–32)
COLOR UR: ABNORMAL
CREAT SERPL-MCNC: 2.34 MG/DL (ref 0.6–1.3)
EOSINOPHIL # BLD MANUAL: 0 THOUSAND/UL (ref 0–0.4)
EOSINOPHIL NFR BLD MANUAL: 0 % (ref 0–6)
ERYTHROCYTE [DISTWIDTH] IN BLOOD BY AUTOMATED COUNT: 13.5 % (ref 11.6–15.1)
GFR SERPL CREATININE-BSD FRML MDRD: 20 ML/MIN/1.73SQ M
GLUCOSE SERPL-MCNC: 172 MG/DL (ref 65–140)
GLUCOSE UR STRIP-MCNC: ABNORMAL MG/DL
HCT VFR BLD AUTO: 33.5 % (ref 34.8–46.1)
HGB BLD-MCNC: 10.6 G/DL (ref 11.5–15.4)
HGB UR QL STRIP.AUTO: ABNORMAL
HYALINE CASTS #/AREA URNS LPF: ABNORMAL /LPF
INR PPP: 1.18 (ref 0.84–1.19)
KETONES UR STRIP-MCNC: NEGATIVE MG/DL
LACTATE SERPL-SCNC: 2 MMOL/L (ref 0.5–2)
LEUKOCYTE ESTERASE UR QL STRIP: ABNORMAL
LYMPHOCYTES # BLD AUTO: 0.56 THOUSAND/UL (ref 0.6–4.47)
LYMPHOCYTES # BLD AUTO: 3 % (ref 14–44)
MCH RBC QN AUTO: 29.8 PG (ref 26.8–34.3)
MCHC RBC AUTO-ENTMCNC: 31.6 G/DL (ref 31.4–37.4)
MCV RBC AUTO: 94 FL (ref 82–98)
MONOCYTES # BLD AUTO: 0.19 THOUSAND/UL (ref 0–1.22)
MONOCYTES NFR BLD: 1 % (ref 4–12)
NEUTROPHILS # BLD MANUAL: 17.89 THOUSAND/UL (ref 1.85–7.62)
NEUTS BAND NFR BLD MANUAL: 28 % (ref 0–8)
NEUTS SEG NFR BLD AUTO: 67 % (ref 43–75)
NITRITE UR QL STRIP: POSITIVE
NON-SQ EPI CELLS URNS QL MICRO: ABNORMAL /HPF
NRBC BLD AUTO-RTO: 0 /100 WBCS
PH UR STRIP.AUTO: 5.5 [PH]
PLATELET # BLD AUTO: 317 THOUSANDS/UL (ref 149–390)
PLATELET BLD QL SMEAR: ADEQUATE
PMV BLD AUTO: 9.5 FL (ref 8.9–12.7)
POTASSIUM SERPL-SCNC: 5 MMOL/L (ref 3.5–5.3)
PROCALCITONIN SERPL-MCNC: 3.53 NG/ML
PROT SERPL-MCNC: 7.9 G/DL (ref 6.4–8.2)
PROT UR STRIP-MCNC: >=300 MG/DL
PROTHROMBIN TIME: 15.3 SECONDS (ref 11.6–14.5)
RBC # BLD AUTO: 3.56 MILLION/UL (ref 3.81–5.12)
RBC #/AREA URNS AUTO: ABNORMAL /HPF
SODIUM SERPL-SCNC: 137 MMOL/L (ref 136–145)
SP GR UR STRIP.AUTO: 1.01 (ref 1–1.03)
TOTAL CELLS COUNTED SPEC: 100
TROPONIN I SERPL-MCNC: <0.02 NG/ML
UROBILINOGEN UR QL STRIP.AUTO: 4 E.U./DL
WBC # BLD AUTO: 18.83 THOUSAND/UL (ref 4.31–10.16)
WBC #/AREA URNS AUTO: ABNORMAL /HPF

## 2021-02-05 PROCEDURE — 84484 ASSAY OF TROPONIN QUANT: CPT | Performed by: PHYSICIAN ASSISTANT

## 2021-02-05 PROCEDURE — 80053 COMPREHEN METABOLIC PANEL: CPT | Performed by: PHYSICIAN ASSISTANT

## 2021-02-05 PROCEDURE — 36415 COLL VENOUS BLD VENIPUNCTURE: CPT | Performed by: PHYSICIAN ASSISTANT

## 2021-02-05 PROCEDURE — 87040 BLOOD CULTURE FOR BACTERIA: CPT | Performed by: PHYSICIAN ASSISTANT

## 2021-02-05 PROCEDURE — 85610 PROTHROMBIN TIME: CPT | Performed by: PHYSICIAN ASSISTANT

## 2021-02-05 PROCEDURE — 87106 FUNGI IDENTIFICATION YEAST: CPT | Performed by: PHYSICIAN ASSISTANT

## 2021-02-05 PROCEDURE — 99285 EMERGENCY DEPT VISIT HI MDM: CPT

## 2021-02-05 PROCEDURE — 93005 ELECTROCARDIOGRAM TRACING: CPT

## 2021-02-05 PROCEDURE — 84145 PROCALCITONIN (PCT): CPT | Performed by: PHYSICIAN ASSISTANT

## 2021-02-05 PROCEDURE — 96365 THER/PROPH/DIAG IV INF INIT: CPT

## 2021-02-05 PROCEDURE — 85730 THROMBOPLASTIN TIME PARTIAL: CPT | Performed by: PHYSICIAN ASSISTANT

## 2021-02-05 PROCEDURE — 96361 HYDRATE IV INFUSION ADD-ON: CPT

## 2021-02-05 PROCEDURE — 85007 BL SMEAR W/DIFF WBC COUNT: CPT | Performed by: PHYSICIAN ASSISTANT

## 2021-02-05 PROCEDURE — 83605 ASSAY OF LACTIC ACID: CPT | Performed by: PHYSICIAN ASSISTANT

## 2021-02-05 PROCEDURE — 85027 COMPLETE CBC AUTOMATED: CPT | Performed by: PHYSICIAN ASSISTANT

## 2021-02-05 PROCEDURE — 87086 URINE CULTURE/COLONY COUNT: CPT | Performed by: PHYSICIAN ASSISTANT

## 2021-02-05 PROCEDURE — 81001 URINALYSIS AUTO W/SCOPE: CPT | Performed by: PHYSICIAN ASSISTANT

## 2021-02-05 PROCEDURE — 99285 EMERGENCY DEPT VISIT HI MDM: CPT | Performed by: PHYSICIAN ASSISTANT

## 2021-02-05 PROCEDURE — G1004 CDSM NDSC: HCPCS

## 2021-02-05 PROCEDURE — 74177 CT ABD & PELVIS W/CONTRAST: CPT

## 2021-02-05 PROCEDURE — 99223 1ST HOSP IP/OBS HIGH 75: CPT | Performed by: INTERNAL MEDICINE

## 2021-02-05 PROCEDURE — 1124F ACP DISCUSS-NO DSCNMKR DOCD: CPT | Performed by: PHYSICIAN ASSISTANT

## 2021-02-05 RX ORDER — HEPARIN SODIUM 5000 [USP'U]/ML
5000 INJECTION, SOLUTION INTRAVENOUS; SUBCUTANEOUS EVERY 8 HOURS SCHEDULED
Status: DISCONTINUED | OUTPATIENT
Start: 2021-02-05 | End: 2021-02-05

## 2021-02-05 RX ORDER — SODIUM CHLORIDE 9 MG/ML
125 INJECTION, SOLUTION INTRAVENOUS CONTINUOUS
Status: DISCONTINUED | OUTPATIENT
Start: 2021-02-05 | End: 2021-02-08

## 2021-02-05 RX ORDER — ECHINACEA PURPUREA EXTRACT 125 MG
1 TABLET ORAL EVERY 2 HOUR PRN
Status: DISCONTINUED | OUTPATIENT
Start: 2021-02-05 | End: 2021-02-10 | Stop reason: HOSPADM

## 2021-02-05 RX ORDER — ONDANSETRON 2 MG/ML
4 INJECTION INTRAMUSCULAR; INTRAVENOUS EVERY 6 HOURS PRN
Status: DISCONTINUED | OUTPATIENT
Start: 2021-02-05 | End: 2021-02-10 | Stop reason: HOSPADM

## 2021-02-05 RX ORDER — PILOCARPINE HYDROCHLORIDE 5 MG/1
5 TABLET, FILM COATED ORAL 4 TIMES DAILY
Status: DISCONTINUED | OUTPATIENT
Start: 2021-02-05 | End: 2021-02-06

## 2021-02-05 RX ORDER — ACETAMINOPHEN 325 MG/1
650 TABLET ORAL EVERY 6 HOURS PRN
Status: DISCONTINUED | OUTPATIENT
Start: 2021-02-05 | End: 2021-02-10 | Stop reason: HOSPADM

## 2021-02-05 RX ORDER — DIPHENHYDRAMINE HYDROCHLORIDE 50 MG/ML
25 INJECTION INTRAMUSCULAR; INTRAVENOUS ONCE
Status: COMPLETED | OUTPATIENT
Start: 2021-02-05 | End: 2021-02-05

## 2021-02-05 RX ORDER — DOCUSATE SODIUM 100 MG/1
100 CAPSULE, LIQUID FILLED ORAL 2 TIMES DAILY
Status: DISCONTINUED | OUTPATIENT
Start: 2021-02-05 | End: 2021-02-07

## 2021-02-05 RX ORDER — ASPIRIN 81 MG/1
81 TABLET, CHEWABLE ORAL DAILY
Status: DISCONTINUED | OUTPATIENT
Start: 2021-02-06 | End: 2021-02-07

## 2021-02-05 RX ORDER — HEPARIN SODIUM 5000 [USP'U]/ML
5000 INJECTION, SOLUTION INTRAVENOUS; SUBCUTANEOUS EVERY 8 HOURS SCHEDULED
Status: DISCONTINUED | OUTPATIENT
Start: 2021-02-05 | End: 2021-02-07

## 2021-02-05 RX ORDER — PAROXETINE HYDROCHLORIDE 20 MG/1
10 TABLET, FILM COATED ORAL DAILY
Status: DISCONTINUED | OUTPATIENT
Start: 2021-02-06 | End: 2021-02-10 | Stop reason: HOSPADM

## 2021-02-05 RX ADMIN — PIPERACILLIN AND TAZOBACTAM 3.38 G: 36; 4.5 INJECTION, POWDER, FOR SOLUTION INTRAVENOUS at 14:42

## 2021-02-05 RX ADMIN — SODIUM CHLORIDE 1000 ML: 0.9 INJECTION, SOLUTION INTRAVENOUS at 13:24

## 2021-02-05 RX ADMIN — ONDANSETRON 4 MG: 2 INJECTION INTRAMUSCULAR; INTRAVENOUS at 23:16

## 2021-02-05 RX ADMIN — HEPARIN SODIUM 5000 UNITS: 5000 INJECTION INTRAVENOUS; SUBCUTANEOUS at 16:58

## 2021-02-05 RX ADMIN — IOHEXOL 100 ML: 350 INJECTION, SOLUTION INTRAVENOUS at 13:29

## 2021-02-05 RX ADMIN — ACETAMINOPHEN 650 MG: 325 TABLET ORAL at 23:11

## 2021-02-05 RX ADMIN — PILOCARPINE HYDROCHLORIDE 5 MG: 5 TABLET, FILM COATED ORAL at 21:15

## 2021-02-05 RX ADMIN — DIPHENHYDRAMINE HYDROCHLORIDE 25 MG: 50 INJECTION, SOLUTION INTRAMUSCULAR; INTRAVENOUS at 15:32

## 2021-02-05 RX ADMIN — PILOCARPINE HYDROCHLORIDE 5 MG: 5 TABLET, FILM COATED ORAL at 18:17

## 2021-02-05 RX ADMIN — HEPARIN SODIUM 5000 UNITS: 5000 INJECTION INTRAVENOUS; SUBCUTANEOUS at 21:16

## 2021-02-05 RX ADMIN — CEFEPIME HYDROCHLORIDE 1000 MG: 2 INJECTION, POWDER, FOR SOLUTION INTRAVENOUS at 21:16

## 2021-02-05 RX ADMIN — SODIUM CHLORIDE 1000 ML: 0.9 INJECTION, SOLUTION INTRAVENOUS at 14:16

## 2021-02-05 RX ADMIN — SALINE NASAL SPRAY 1 SPRAY: 1.5 SOLUTION NASAL at 21:41

## 2021-02-05 RX ADMIN — SODIUM CHLORIDE 100 ML/HR: 0.9 INJECTION, SOLUTION INTRAVENOUS at 16:58

## 2021-02-05 NOTE — ED PROVIDER NOTES
History  Chief Complaint   Patient presents with    Post-op Problem     pt had kidney stone removed yesterday, is now c/o weakness chills and sweating  77yo female with a history of hypertension, hyperlipidemia, AAA, and Sjogren's syndrome presenting for evaluation of chills and weakness  Patient underwent cystoscopy and right ureteral stent placement yesterday with Dr Rajesh Perez  A few hours after returning home yesterday, she began experiencing shaking chills  Patient states "Seven blankets couldn't keep me warm " Patient continued to feel unwell today and began feeling weak and sweaty  No fevers  She called the urology office and was advised to go to the ER  Patient admits to hematuria and dysuria but attributed this to her urologic procedure  No n/v/d, chest pain, shortness of breath  History provided by:  Patient and medical records   used: No    Medical Problem  Severity:  Severe  Onset quality:  Sudden  Duration:  1 day  Timing:  Constant  Progression:  Worsening  Chronicity:  New  Context:  Ureteral stent placement yesterday  Started having shaking chills after procedure  Relieved by:  Nothing  Worsened by:  Nothing  Associated symptoms: abdominal pain and fatigue    Associated symptoms: no chest pain, no congestion, no cough, no diarrhea, no fever, no loss of consciousness, no nausea, no rash, no rhinorrhea, no shortness of breath, no sore throat, no vomiting and no wheezing        Prior to Admission Medications   Prescriptions Last Dose Informant Patient Reported? Taking?    Ascorbic Acid (VITAMIN C) 100 MG tablet  Self Yes No   Sig: Take 1 tablet by mouth daily   B Complex Vitamins (VITAMIN B COMPLEX) TABS  Self Yes No   Sig: Take 1 tablet by mouth daily   CINNAMON PO  Self Yes No   Sig: Take 1 capsule by mouth daily    Coenzyme Q10 (CO Q 10) 10 MG CAPS  Self Yes No   Sig: Take 1 capsule by mouth daily   Lutein 40 MG CAPS  Self Yes No   Sig: Take by mouth   Multiple Vitamins-Minerals (EYE VITAMINS & MINERALS PO)  Self Yes No   Sig: Take by mouth   Multiple Vitamins-Minerals (MULTIVITAMIN ADULT PO)  Self Yes No   Sig: Take 1 tablet by mouth daily   Omega-3 Fatty Acids (FISH OIL PO)  Self Yes No   Sig: Take 7,000 mg by mouth daily    PARoxetine (PAXIL) 10 mg tablet  Self No No   Sig: Take 1 tablet (10 mg total) by mouth daily   acetaminophen (TYLENOL) 325 mg tablet  Self No No   Sig: Take 2 tablets (650 mg total) by mouth every 4 (four) hours as needed for mild pain   acetaminophen (TYLENOL) 325 mg tablet   No No   Sig: Take 2 tablets (650 mg total) by mouth every 4 (four) hours as needed for mild pain   aspirin 81 mg chewable tablet  Self Yes No   Sig: Chew 81 mg daily   cephalexin (KEFLEX) 500 mg capsule   No No   Sig: Take 1 capsule (500 mg total) by mouth every 6 (six) hours for 3 doses Start the morning of your stent removal   cholecalciferol (VITAMIN D3) 1,000 units tablet  Self Yes No   Sig: Take 1 tablet by mouth daily 5000 mg   docusate sodium (COLACE) 100 mg capsule  Self No No   Sig: Take 1 capsule (100 mg total) by mouth 2 (two) times a day for 15 days   docusate sodium (COLACE) 100 mg capsule   No No   Sig: Take 1 capsule (100 mg total) by mouth 2 (two) times a day for 15 days   naproxen (NAPROSYN) 500 mg tablet   No No   Sig: Take 1 tablet (500 mg total) by mouth 2 (two) times a day with meals for 5 days For pain   olmesartan (BENICAR) 40 mg tablet  Self No No   Sig: Take 1 tablet (40 mg total) by mouth daily   oxyCODONE (ROXICODONE) 5 mg immediate release tablet   No No   Sig: Take 1 tablet (5 mg total) by mouth every 4 (four) hours as needed for severe pain for up to 5 daysMax Daily Amount: 30 mg   phenazopyridine (PYRIDIUM) 200 mg tablet   No No   Sig: Take 1 tablet (200 mg total) by mouth 3 (three) times a day as needed (Pain with urination) for up to 3 days   pilocarpine (SALAGEN) 5 mg tablet  Self Yes No   Sig: Take 5 mg by mouth 4 (four) times a day potassium citrate (Urocit-K 10) 10 mEq  Self No No   Sig: Take 1 tablet (10 mEq total) by mouth 2 (two) times a day   rosuvastatin (CRESTOR) 5 mg tablet  Self No No   Sig: Take 1 tablet (5 mg total) by mouth daily   tamsulosin (FLOMAX) 0 4 mg   No No   Sig: Take 1 capsule (0 4 mg total) by mouth daily with dinner      Facility-Administered Medications: None       Past Medical History:   Diagnosis Date    Abdominal aortic aneurysm (AAA) (HCC)     Hyperlipidemia     Hypertension     Kidney stone     Kidney stones     Pneumonia     Sjogren's syndrome ([address] )        Past Surgical History:   Procedure Laterality Date    CATARACT EXTRACTION      DILATION AND CURETTAGE OF UTERUS      FL RETROGRADE PYELOGRAM  12/7/2020    IR NEPHROURETERAL ACCESS FOR UROLOGY PCNL  12/1/2020    IN CYSTO/URETERO W/LITHOTRIPSY &INDWELL STENT INSRT Right 2/4/2021    Procedure: CYSTOSCOPY URETEROSCOPY WITH LITHOTRIPSY HOLMIUM LASER, RETROGRADE PYELOGRAM AND INSERTION STENT URETERAL;  Surgeon: Laina Robert MD;  [facility];  Service: Urology    IN CYSTO/URETERO/PYELOSCOPY, DX Right 2/15/2017    Procedure: URETEROSCOPY, STENT PLACEMENT ;  Surgeon: Laina Robert MD;  [facility];  Service: Urology    IN PERCUT Ken CM Right 2/15/2017    Procedure: ACCESS INTERPOLAR CALYX, INSERTED TWO WIRES INTO BLADDER, NEPHROLITHOTOMY  PERCUTANEOUS ;  Surgeon: Laina Robert MD;  [facility];  Service: Urology    IN WILFRIDUT Ken CM Right 12/7/2020    Procedure: NEPHROLITHOTOMY  PERCUTANEOUS (PCNL);   Surgeon: Laina Robert MD;  [facility];  Service: Urology    SHOULDER SURGERY         Family History   Problem Relation Age of Onset    Cancer Mother     Colon cancer Mother 80        CARCINOMA     Melanoma Mother     Alzheimer's disease Father     Urinary tract infection Sister     Other Sister 39        urinary tract cancer    Cancer Brother BLADDER    Stroke Maternal Grandfather         CVA    No Known Problems Daughter     No Known Problems Maternal Grandmother     No Known Problems Paternal Grandmother     No Known Problems Sister     No Known Problems Sister     No Known Problems Daughter     No Known Problems Daughter     Lung cancer Maternal Aunt 79    No Known Problems Paternal Aunt     No Known Problems Paternal Aunt      I have reviewed and agree with the history as documented  E-Cigarette/Vaping    E-Cigarette Use Never User      E-Cigarette/Vaping Substances     Social History     Tobacco Use    Smoking status: Former Smoker     Packs/day:  00     Years:      Pack years:      Quit date:      Years since quittin 1    Smokeless tobacco: Former User     Quit date:     Tobacco comment: smokes "very rarely"   Substance Use Topics    Alcohol use: Yes     Frequency: Monthly or less     Drinks per session: 1 or 2    Drug use: No       Review of Systems   Constitutional: Positive for chills and fatigue  Negative for fever  HENT: Negative for congestion, drooling, rhinorrhea and sore throat  Eyes: Negative for discharge and redness  Respiratory: Negative for cough, shortness of breath and wheezing  Cardiovascular: Negative for chest pain  Gastrointestinal: Positive for abdominal pain  Negative for diarrhea, nausea and vomiting  Genitourinary: Positive for difficulty urinating and hematuria  Musculoskeletal: Negative for neck pain and neck stiffness  Skin: Negative for color change and rash  Neurological: Positive for weakness  Negative for loss of consciousness and syncope  Psychiatric/Behavioral: Negative for confusion  The patient is not nervous/anxious  All other systems reviewed and are negative  Physical Exam  Physical Exam  Vitals signs and nursing note reviewed  Constitutional:       General: She is not in acute distress  Appearance: She is well-developed   She is not diaphoretic  Comments: Non-toxic    HENT:      Head: Normocephalic and atraumatic  Right Ear: External ear normal       Left Ear: External ear normal       Nose: Nose normal    Eyes:      General: No scleral icterus  Right eye: No discharge  Left eye: No discharge  Conjunctiva/sclera: Conjunctivae normal    Neck:      Musculoskeletal: Normal range of motion and neck supple  Cardiovascular:      Rate and Rhythm: Normal rate and regular rhythm  Heart sounds: Normal heart sounds  No murmur  Pulmonary:      Effort: Pulmonary effort is normal  No respiratory distress  Breath sounds: Normal breath sounds  No stridor  No wheezing or rales  Abdominal:      General: Bowel sounds are normal  There is no distension  Palpations: Abdomen is soft  Tenderness: There is abdominal tenderness in the right lower quadrant  There is no guarding  Musculoskeletal: Normal range of motion  General: No deformity  Lymphadenopathy:      Cervical: No cervical adenopathy  Skin:     General: Skin is warm and dry  Neurological:      Mental Status: She is alert  She is not disoriented  GCS: GCS eye subscore is 4  GCS verbal subscore is 5  GCS motor subscore is 6     Psychiatric:         Behavior: Behavior normal          Vital Signs  ED Triage Vitals   Temperature Pulse Respirations Blood Pressure SpO2   02/05/21 1304 02/05/21 1304 02/05/21 1304 02/05/21 1310 02/05/21 1304   97 8 °F (36 6 °C) 100 19 (!) 71/49 97 %      Temp Source Heart Rate Source Patient Position - Orthostatic VS BP Location FiO2 (%)   02/05/21 1304 02/05/21 1304 02/05/21 1304 02/05/21 1304 --   Tympanic Monitor Sitting Left arm       Pain Score       --                  Vitals:    02/05/21 1407 02/05/21 1445 02/05/21 1500 02/05/21 1511   BP: 139/70 162/74 135/67 135/67   Pulse: 93 82 98 104   Patient Position - Orthostatic VS:   Lying Lying         Visual Acuity      ED Medications  Medications   sodium chloride 0 9 % infusion (has no administration in time range)   diphenhydrAMINE (BENADRYL) injection 25 mg (has no administration in time range)   sodium chloride 0 9 % bolus 1,000 mL (0 mL Intravenous Stopped 2/5/21 1444)   iohexol (OMNIPAQUE) 350 MG/ML injection (MULTI-DOSE) 100 mL (100 mL Intravenous Given 2/5/21 1329)   sodium chloride 0 9 % bolus 1,000 mL (1,000 mL Intravenous New Bag 2/5/21 1416)   piperacillin-tazobactam (ZOSYN) 3 375 g in sodium chloride 0 9 % 100 mL IVPB (0 g Intravenous Stopped 2/5/21 1520)       Diagnostic Studies  Results Reviewed     Procedure Component Value Units Date/Time    Urine Microscopic [[id number]]  (Abnormal) Collected: 02/05/21 1441    Lab Status: Final result Specimen: Urine, Clean Catch Updated: 02/05/21 1505     RBC, UA 0-1 /hpf      WBC, UA 30-50 /hpf      Epithelial Cells Occasional /hpf      Bacteria, UA Occasional /hpf      Hyaline Casts, UA 0-1 /lpf     Urine culture [[id number]] Collected: 02/05/21 1441    Lab Status: In process Specimen: Urine, Clean Catch Updated: 02/05/21 1505    UA w Reflex to Microscopic w Reflex to Culture [[id number]]  (Abnormal) Collected: 02/05/21 1441    Lab Status: Final result Specimen: Urine, Clean Catch Updated: 02/05/21 1453     Color, UA Orange     Clarity, UA Cloudy     Specific Gravity, UA 1 015     pH, UA 5 5     Leukocytes, UA Moderate     Nitrite, UA Positive     Protein, UA >=300 mg/dl      Glucose,  (1/10%) mg/dl      Ketones, UA Negative mg/dl      Urobilinogen, UA 4 0 E U /dl      Bilirubin, UA Moderate     Blood, UA Moderate    Blood culture #1 [[id number]] Collected: 02/05/21 1430    Lab Status:  In process Specimen: Blood from Arm, Left Updated: 02/05/21 1434    CBC and differential [[id number]]  (Abnormal) Collected: 02/05/21 1325    Lab Status: Final result Specimen: Blood from Arm, Left Updated: 02/05/21 1416     WBC 18 83 Thousand/uL      RBC 3 56 Million/uL      Hemoglobin 10 6 g/dL      Hematocrit 33 5 %      MCV 94 fL      MCH 29 8 pg      MCHC 31 6 g/dL      RDW 13 5 %      MPV 9 5 fL      Platelets 981 Thousands/uL      nRBC 0 /100 WBCs     Manual Differential(PHLEBS Do Not Order) [[id number]]  (Abnormal) Collected: 02/05/21 1325    Lab Status: Final result Specimen: Blood from Arm, Left Updated: 02/05/21 1416     Segmented % 67 %      Bands % 28 %      Lymphocytes % 3 %      Monocytes % 1 %      Eosinophils, % 0 %      Basophils % 1 %      Absolute Neutrophils 17 89 Thousand/uL      Lymphocytes Absolute 0 56 Thousand/uL      Monocytes Absolute 0 19 Thousand/uL      Eosinophils Absolute 0 00 Thousand/uL      Basophils Absolute 0 19 Thousand/uL      Total Counted 100     Platelet Estimate Adequate    Lactic acid [[id number]]  (Normal) Collected: 02/05/21 1325    Lab Status: Final result Specimen: Blood from Arm, Left Updated: 02/05/21 1404     LACTIC ACID 2 0 mmol/L     Narrative:      Result may be elevated if tourniquet was used during collection      Troponin I [[id number]]  (Normal) Collected: 02/05/21 1325    Lab Status: Final result Specimen: Blood from Arm, Left Updated: 02/05/21 1400     Troponin I <0 02 ng/mL     Comprehensive metabolic panel [[id number]]  (Abnormal) Collected: 02/05/21 1325    Lab Status: Final result Specimen: Blood from Arm, Left Updated: 02/05/21 1359     Sodium 137 mmol/L      Potassium 5 0 mmol/L      Chloride 101 mmol/L      CO2 26 mmol/L      ANION GAP 10 mmol/L      BUN 35 mg/dL      Creatinine 2 34 mg/dL      Glucose 172 mg/dL      Calcium 10 2 mg/dL      Corrected Calcium 11 1 mg/dL       U/L       U/L      Alkaline Phosphatase 94 U/L      Total Protein 7 9 g/dL      Albumin 2 9 g/dL      Total Bilirubin 0 60 mg/dL      eGFR 20 ml/min/1 73sq m     Narrative:      Elizabet guidelines for Chronic Kidney Disease (CKD):     Stage 1 with normal or high GFR (GFR > 90 mL/min/1 73 square meters)    Stage 2 Mild CKD (GFR = 60-89 mL/min/1 73 square meters)   Stage 3A Moderate CKD (GFR = 45-59 mL/min/1 73 square meters)    Stage 3B Moderate CKD (GFR = 30-44 mL/min/1 73 square meters)    Stage 4 Severe CKD (GFR = 15-29 mL/min/1 73 square meters)    Stage 5 End Stage CKD (GFR <15 mL/min/1 73 square meters)  Note: GFR calculation is accurate only with a steady state creatinine    Protime-INR [[id number]]  (Abnormal) Collected: 02/05/21 1325    Lab Status: Final result Specimen: Blood from Arm, Left Updated: 02/05/21 1348     Protime 15 3 seconds      INR 1 18    APTT [[id number]]  (Normal) Collected: 02/05/21 1325    Lab Status: Final result Specimen: Blood from Arm, Left Updated: 02/05/21 1348     PTT 31 seconds     Procalcitonin with AM Reflex [[id number]] Collected: 02/05/21 1325    Lab Status: In process Specimen: Blood from Arm, Left Updated: 02/05/21 1332    Blood culture #2 [[id number]] Collected: 02/05/21 1325    Lab Status: In process Specimen: Blood from Arm, Left Updated: 02/05/21 1332                 CT abdomen pelvis with contrast   Final Result by Devyn Ferreira MD (02/05 1423)      1  Status post replacement of right ureteral stent with significant improvement of previous hydronephrosis  2  Single 1 to 2 mm calculus remaining in the right ureter adjacent to the stent but significantly diminished ureteral calculus burden since prior  3  Right kidney lower pole calculi as above   4  Urothelial enhancement of the renal pelvis, periureteral edema, bladder wall enhancement, suspicious for urinary tract infection  5  Unchanged appearance of left adnexal cyst, hiatal hernia, abdominal aortic aneurysm, and left renal cyst            Workstation performed: [id number]                    Procedures  Procedures         ED Course  ED Course as of Feb 05 1554   Fri Feb 05, 2021   1320 Due to hypotension and chills, called CT to expedite imaging  Benefits outweigh risks of waiting for lab work to return  1401 Baseline creatinine around 1      Creatinine(!): 2 34 [phone] 14 90 Urology paged  5052 Discussed case with on call urology AP, Jonnathan Vaca  Per Padmaja Situ, there is no indication for transfer at this time  Patient does not require anything surgical  Urology recommended abx and admission  1539 Patient re-evaluated after IV Zosyn  She developed urticarial lesions post antibiotic administration  No respiratory distress and lungs are clear without wheezing or stridor  Zosyn stopped and IV Benadryl ordered  Zosyn listed in patient's allergy list                    Identification of Seniors at Risk      Most Recent Value   (ISAR) Identification of Seniors at Risk   Before the illness or injury that brought you to the Emergency, did you need someone to help you on a regular basis? 0 Filed at: 02/05/2021 1306   In the last 24 hours, have you needed more help than usual?  0 Filed at: 02/05/2021 1306   Have you been hospitalized for one or more nights during the past 6 months? 0 Filed at: 02/05/2021 1306   In general, do you see well?  0 Filed at: 02/05/2021 1306   In general, do you have serious problems with your memory? 0 Filed at: 02/05/2021 1306   Do you take more than three different medications every day?  0 Filed at: 02/05/2021 1306   ISAR Score  0 Filed at: 02/05/2021 1306                Initial Sepsis Screening     Row Name 02/05/21 1440                Is the patient's history suggestive of a new or worsening infection? (!) Yes (Proceed)  -MM        Suspected source of infection  urinary tract infection  -MM        Are two or more of the following signs & symptoms of infection both present and new to the patient?   (!) Yes (Proceed)  -MM        Indicate SIRS criteria  Tachycardia > 90 bpm;WBC > 10% bands;Leukocytosis (WBC > 53652 IJL)  -MM        If the answer is yes to both questions, suspicion of sepsis is present  --        If severe sepsis is present AND tissue hypoperfusion perists in the hour after fluid resuscitation or lactate > 4, the patient meets criteria for SEPTIC SHOCK  --        Are any of the following organ dysfunction criteria present within 6 hours of suspected infection and SIRS criteria that are NOT considered to be chronic conditions? (!) Yes  -MM        Organ dysfunction  Creatinine > 2 0 mg/dL; Creatinine > 0 5 mg/dl ABOVE BASELINE  -MM        Date of presentation of severe sepsis  02/05/21  -MM        Time of presentation of severe sepsis  1441  -MM        Tissue hypoperfusion persists in the hour after crystalloid fluid administration, evidenced, by either:  --        Was hypotension present within one hour of the conclusion of crystalloid fluid administration?   No  -MM        Date of presentation of septic shock  --        Time of presentation of septic shock  --          User Key  (r) = Recorded By, (t) = Taken By, (c) = Cosigned By    Initials Name Provider Type    JAZMIN Boswell PA-C Physician Assistant           Default Flowsheet Data (last 720 hours)      Sepsis Reassess     Row Name 02/05/21 1521                   Repeat Volume Status and Tissue Perfusion Assessment Performed    Repeat Volume Status and Tissue Perfusion Assessment Performed  --           Volume Status and Tissue Perfusion Post Fluid Resuscitation * Must Document All *    Vital Signs Reviewed (HR, RR, BP, T)  Yes  -MM        Shock Index Reviewed  Yes  -MM        Arterial Oxygen Saturation Reviewed (POx, SaO2 or SpO2)  --        Cardio  Normal S1/S2  -MM        Pulmonary  Normal effort  -MM        Capillary Refill  Brisk  -MM        Peripheral Pulses  Radial  -MM        Peripheral Pulse  +2  -MM        Skin  Warm;Dry  -MM        Urine output assessed  Adequate  -MM           *OR*   Intensive Monitoring- Must Document One of the Following Four *:    Vital Signs Reviewed  --        * Central Venous Pressure (CVP or RAP)  --        * Central Venous Oxygen (SVO2, ScvO2 or Oxygen saturation via central catheter)  --        * Bedside Cardiovascular US in IVC diameter and % collapse  --        * Passive Leg Raise OR Crystalloid Challenge  --          User Key  (r) = Recorded By, (t) = Taken By, (c) = Cosigned By    Initials Name Provider Type    JAZMIN Grijalva PA-C Physician Assistant                      MDM  Number of Diagnoses or Management Options  Acute kidney injury [facility]): new and requires workup  Acute pyelitis: new and requires workup  Severe sepsis [facility]): new and requires workup  Transaminitis: new and requires workup  Diagnosis management comments: 77yo female presenting for chills, rigors, and weakness that began last night  She underwent a right ureteral stent placement yesterday  No fevers at home  On arrival, blood pressure was 71/40  Patient immediately evaluated and blood pressure improved to SBP of 117 without intervention  Differential diagnosis includes but is not limited to: sepsis, UTI, pyelitis/pyelonephritis, postoperative complication, abscess, AAA    Initial ED plan: Check septic workup including blood cultures, lactate, UA  Will have patient go immediately to CT given hypotension  IV fluid bolus  Final assessment: Labs significant for a leukocytosis with WBC of 18 8 with a 28% bandemia  Creatinine significantly elevated at 2 3 (baseline around 1)  Lactate 2 0  She also is noted to have a mild transaminitis which appears to be new  UA is nitrite positive  CT reveals urothelial enhancement of  tract consistent with infection  Discussed case with on call urology Rachelle CERVANTES  Urology recommends treatment for pyelitis with IV abx  No surgical intervention needed at this time  Patient given dose of IV Zosyn but developed urticaria after administration  No respiratory of GI symptoms  Zosyn stopped and patient given IV Benadryl  Patient admitted to internal medicine service for further management          Amount and/or Complexity of Data Reviewed  Clinical lab tests: ordered and reviewed  Tests in the radiology section of CPT®: reviewed and ordered  Tests in the medicine section of CPT®: ordered and reviewed  Review and summarize past medical records: yes  Discuss the patient with other providers: yes  Independent visualization of images, tracings, or specimens: yes    Risk of Complications, Morbidity, and/or Mortality  Presenting problems: high  Diagnostic procedures: high  Management options: high        Disposition  Final diagnoses:   Severe sepsis ([facility] [address] )   Acute pyelitis   Acute kidney injury ([facility] [address] )   Transaminitis     Time reflects when diagnosis was documented in both MDM as applicable and the Disposition within this note     Time User Action Codes Description Comment    2/5/2021  3:31  [facility] [place] [A41 9,  R65 20] Severe sepsis ([facility] [address] )     2/5/2021  3:31  [facility] [place] [N10] Acute pyelitis     2/5/2021  3:31 PM [place] Add [N17 9] Acute kidney injury ([facility] [address] )     2/5/2021  3:31  [place] [facility] [R74 01] Transaminitis       ED Disposition     ED Disposition Condition Date/Time Comment    Admit Stable Fri Feb 5, 2021  3:31 PM Case was discussed with Dr Patricia Weaver and the patient's admission status was agreed to be Admission Status: inpatient status to the service of Dr Patricia Weaver   Follow-up Information    None         Patient's Medications   Discharge Prescriptions    No medications on file     No discharge procedures on file      PDMP Review     None          ED Provider  Electronically Signed by           Bambi Ortiz PA-C  02/05/21 6462

## 2021-02-05 NOTE — SEPSIS NOTE
Sepsis Note   Yvette Reid 76 y o  female MRN: [id number]  Unit/Bed#: ED 14 Encounter: [id number]      qSOFA     Row Name 02/05/21 1310 02/05/21 1304             Altered mental status GCS < 15  --  --       Respiratory Rate > / =22  --  0       Systolic BP < / =678  1  --       Q Sofa Score  1  --           Initial Sepsis Screening     Row Name 02/05/21 1440                Is the patient's history suggestive of a new or worsening infection? (!) Yes (Proceed)  -MM        Suspected source of infection  urinary tract infection  -MM        Are two or more of the following signs & symptoms of infection both present and new to the patient? (!) Yes (Proceed)  -MM        Indicate SIRS criteria  Tachycardia > 90 bpm;WBC > 10% bands;Leukocytosis (WBC > 07235 IJL)  -MM        If the answer is yes to both questions, suspicion of sepsis is present  --        If severe sepsis is present AND tissue hypoperfusion perists in the hour after fluid resuscitation or lactate > 4, the patient meets criteria for SEPTIC SHOCK  --        Are any of the following organ dysfunction criteria present within 6 hours of suspected infection and SIRS criteria that are NOT considered to be chronic conditions? (!) Yes  -MM        Organ dysfunction  Creatinine > 2 0 mg/dL; Creatinine > 0 5 mg/dl ABOVE BASELINE  -MM        Date of presentation of severe sepsis  02/05/21  -MM        Time of presentation of severe sepsis  1441  -MM        Tissue hypoperfusion persists in the hour after crystalloid fluid administration, evidenced, by either:  --        Was hypotension present within one hour of the conclusion of crystalloid fluid administration?   No  -MM        Date of presentation of septic shock  --        Time of presentation of septic shock  --          User Key  (r) = Recorded By, (t) = Taken By, (c) = Cosigned By    [id number] Name Provider Type    [address] [place] [address], PAMENDY Physician Assistant

## 2021-02-05 NOTE — ASSESSMENT & PLAN NOTE
Patient underwent cystoscopy yesterday with right ureteral stent placement  Will have urology follow-up as outlined above

## 2021-02-05 NOTE — TELEPHONE ENCOUNTER
Pt calling with nausea this morning, states she had terrible night with chills she is apprehensive to take prescribed medications

## 2021-02-05 NOTE — ASSESSMENT & PLAN NOTE
Presented with hypotension, chills, rigors, leukocytosis of 18 K  Urinalysis showed bacteria, nitrates, leukocytes  Received IV dose of Zosyn emergency department  Was switched over to cefepime and continue  Follow-up urine culture  IVF  Urology consult

## 2021-02-05 NOTE — ASSESSMENT & PLAN NOTE
Baseline creatinine approximately 1  Presented creatinine 2 3  Will provide IVF  Repeat BMP in the a m

## 2021-02-05 NOTE — H&P
H&P- Rosie Vidal 1945, 76 y o  female MRN: [id number]    Unit/Bed#: ED 14 Encounter: [id number]    Primary Care Provider: Jessica Guerra PA-C   Date and time admitted to hospital: 2/5/2021  1:10 PM        * UTI (urinary tract infection)  Assessment & Plan  Presented with hypotension, chills, rigors, leukocytosis of 18 K  Urinalysis showed bacteria, nitrates, leukocytes  Received IV dose of Zosyn emergency department  Was switched over to cefepime and continue  Follow-up urine culture  IVF  Urology consult      Acute kidney injury [facility])  Assessment & Plan  Baseline creatinine approximately 1  Presented creatinine 2 3  Will provide IVF  Repeat BMP in the a m  Sepsis ([address] )  Assessment & Plan  Evident by WBC count of 18 K, tachycardia, hypotension with source being UTI  Plan as outlined above    Hyperlipidemia  Assessment & Plan  Hold statin therapy in the setting of transaminitis  Repeat LFTs in a m  Benign hypertension  Assessment & Plan  Hold home ARB in the setting of LINDSEY  BP within normal limits  Continue monitor    Nephrolithiasis  Assessment & Plan  Patient underwent cystoscopy yesterday with right ureteral stent placement  Will have urology follow-up as outlined above        VTE Prophylaxis: Heparin  / sequential compression device   Code Status: full code  POLST: There is no POLST form on file for this patient (pre-hospital)  Discussion with family: pt    Anticipated Length of Stay:  Patient will be admitted on an Inpatient basis with an anticipated length of stay of  > 2 midnights  Justification for Hospital Stay:  Urosepsis    Total Time for Visit, including Counseling / Coordination of Care: 1 hour  Greater than 50% of this total time spent on direct patient counseling and coordination of care      Chief Complaint:  Generalized weakness    History of Present Illness:    Rosie Vidal is a 76 y o  female with past medical history significant of hypertension, hyperlipidemia, nephrolithiasis initially presented with generalized weakness and chills  Patient underwent cystoscopy yesterday with right ureteral stent placement by Urology  Shortly afterwards yesterday evening she began feeling shaking chills  She also noted subjective fevers as well as generalized weakness thus side, the emergency department  She currently denies any other symptoms at this time  Denies abdominal pain, nausea, vomiting, diarrhea, constipation chest pain shortness breath palpitations, cough, numbness, or any other symptoms       Review of Systems:    Review of Systems   Constitutional: Positive for activity change, chills, fatigue and fever  Negative for appetite change, diaphoresis and unexpected weight change  HENT: Negative for congestion, facial swelling and rhinorrhea  Eyes: Negative for photophobia and visual disturbance  Respiratory: Negative for cough, shortness of breath and wheezing  Cardiovascular: Negative for chest pain and palpitations  Gastrointestinal: Negative for abdominal pain, blood in stool, constipation, diarrhea, nausea and vomiting  Genitourinary: Negative for decreased urine volume, difficulty urinating, dysuria, flank pain, frequency, hematuria and urgency  Musculoskeletal: Negative for arthralgias, back pain, joint swelling and myalgias  Neurological: Negative for dizziness, syncope, facial asymmetry, weakness, light-headedness, numbness and headaches  Psychiatric/Behavioral: Negative for confusion and decreased concentration  The patient is not nervous/anxious          Past Medical and Surgical History:     Past Medical History:   Diagnosis Date    Abdominal [facility] (AAA) [facility] [address] [facility]     Hyperlipidemia     Hypertension     Kidney stone     Kidney stones     Pneumonia     [facility]        Past Surgical History:   Procedure Laterality Date    CATARACT EXTRACTION      DILATION AND CURETTAGE OF UTERUS      FL RETROGRADE PYELOGRAM 12/7/2020    IR NEPHROURETERAL ACCESS FOR UROLOGY PCNL  12/1/2020    ME CYSTO/URETERO W/LITHOTRIPSY &INDWELL STENT INSRT Right 2/4/2021    Procedure: CYSTOSCOPY URETEROSCOPY WITH LITHOTRIPSY HOLMIUM LASER, RETROGRADE PYELOGRAM AND INSERTION STENT URETERAL;  Surgeon: Maria D Vo MD;  Location: [facility];  Service: Urology    ME CYSTO/URETERO/PYELOSCOPY, DX Right 2/15/2017    Procedure: URETEROSCOPY, STENT PLACEMENT ;  Surgeon: Maria D Vo MD;  [facility]: [facility];  Service: Urology    ME PERCUT [place] CM Right 2/15/2017    Procedure: ACCESS INTERPOLAR CALYX, INSERTED TWO WIRES INTO BLADDER, NEPHROLITHOTOMY  PERCUTANEOUS ;  Surgeon: Maria D Vo MD;  [facility]: [facility];  Service: Urology    ME PERCUT [place] CM Right 12/7/2020    Procedure: NEPHROLITHOTOMY  PERCUTANEOUS (PCNL); Surgeon: Maria D Vo MD;  [facility];  Service: Urology    SHOULDER SURGERY         Meds/Allergies:    Prior to Admission medications    Medication Sig Start Date End Date Taking?  Authorizing Provider   acetaminophen (TYLENOL) 325 mg tablet Take 2 tablets (650 mg total) by mouth every 4 (four) hours as needed for mild pain 12/7/20   Maria D Vo MD   acetaminophen (TYLENOL) 325 mg tablet Take 2 tablets (650 mg total) by mouth every 4 (four) hours as needed for mild pain 2/4/21   Maria D Vo MD   Ascorbic Acid (VITAMIN C) 100 MG tablet Take 1 tablet by mouth daily    Historical Provider, MD   aspirin 81 mg chewable tablet Chew 81 mg daily    Historical Provider, MD   B Complex Vitamins (VITAMIN B COMPLEX) TABS Take 1 tablet by mouth daily    Historical Provider, MD   cephalexin (KEFLEX) 500 mg capsule Take 1 capsule (500 mg total) by mouth every 6 (six) hours for 3 doses Start the morning of your stent removal 2/4/21 2/5/21  Maria D Vo MD   cholecalciferol (VITAMIN D3) 1,000 units tablet Take 1 tablet by mouth daily 5000 mg    Historical Provider, MD   CINNAMON PO Take 1 capsule by mouth daily     Historical Provider, MD   Coenzyme Q10 (CO Q 10) 10 MG CAPS Take 1 capsule by mouth daily    Historical Provider, MD   docusate sodium (COLACE) 100 mg capsule Take 1 capsule (100 mg total) by mouth 2 (two) times a day for 15 days 12/7/20 1/6/21  Vu Hicks MD   docusate sodium (COLACE) 100 mg capsule Take 1 capsule (100 mg total) by mouth 2 (two) times a day for 15 days 2/4/21 2/19/21  Vu Hicks MD   Lutein 40 MG CAPS Take by mouth    Historical Provider, MD   Multiple Vitamins-Minerals (EYE VITAMINS & MINERALS PO) Take by mouth    Historical Provider, MD   Multiple Vitamins-Minerals (MULTIVITAMIN ADULT PO) Take 1 tablet by mouth daily    Historical Provider, MD   naproxen (NAPROSYN) 500 mg tablet Take 1 tablet (500 mg total) by mouth 2 (two) times a day with meals for 5 days For pain 2/4/21 2/9/21  Vu Hicks MD   olmesartan (BENICAR) 40 mg tablet Take 1 tablet (40 mg total) by mouth daily 9/24/20   Ciro Mclaughlins, PA-C   Omega-3 Fatty Acids (FISH OIL PO) Take 7,000 mg by mouth daily  3/28/11   Historical Provider, MD   oxyCODONE (ROXICODONE) 5 mg immediate release tablet Take 1 tablet (5 mg total) by mouth every 4 (four) hours as needed for severe pain for up to 5 daysMax Daily Amount: 30 mg 2/4/21 2/9/21  Vu Hicks MD   PARoxetine (PAXIL) 10 mg tablet Take 1 tablet (10 mg total) by mouth daily 12/18/20   Ciro Mclaughlins, PA-C   phenazopyridine (PYRIDIUM) 200 mg tablet Take 1 tablet (200 mg total) by mouth 3 (three) times a day as needed (Pain with urination) for up to 3 days 2/4/21 2/7/21  Vu Hicks MD   pilocarpine (SALAGEN) 5 mg tablet Take 5 mg by mouth 4 (four) times a day    Historical Provider, MD   potassium citrate (Urocit-K 10) 10 mEq Take 1 tablet (10 mEq total) by mouth 2 (two) times a day 1/20/21   Gregory Landrum MD   rosuvastatin (CRESTOR) 5 mg tablet Take 1 tablet (5 mg total) by mouth daily 20   Jessica Guerra PA-C   tamsulosin [facility]) 0 4 mg Take 1 capsule (0 4 mg total) by mouth daily with dinner 21   Brad Villarreal MD     I have reviewed home medications with patient personally  Allergies:    Allergies   Allergen Reactions    Other Other (See Comments)     "Urdu food"- swollen tongue      Zosyn [Piperacillin Sod-Tazobactam So] Hives       Social History:     Marital Status: /Civil Union     Patient Pre-hospital Living Situation: home  Patient Pre-hospital Level of Mobility: independent  Patient Pre-hospital Diet Restrictions: none  Substance Use History:   Social History     Substance and Sexual Activity   Alcohol Use Yes    Frequency: Monthly or less    Drinks per session: 1 or 2     Social History     Tobacco Use   Smoking Status Former Smoker    Packs/day:     Years:     Pack years:     Quit date:     Years since quittin 1   Smokeless Tobacco Former User    Quit date:    Tobacco Comment    smokes "very rarely"     Social History     Substance and Sexual Activity   Drug Use No       Family History:    Family History   Problem Relation Age of Onset    Cancer Mother     Colon cancer Mother 80        CARCINOMA     Melanoma Mother     Alzheimer's disease Father     Urinary tract infection Sister     Other Sister 39        urinary tract cancer    Cancer Brother         BLADDER    Stroke Maternal Grandfather         CVA    No Known Problems Daughter     No Known Problems Maternal Grandmother     No Known Problems Paternal Grandmother     No Known Problems Sister     No Known Problems Sister     No Known Problems Daughter     No Known Problems Daughter     Lung cancer Maternal Aunt 79    No Known Problems Paternal Aunt     No Known Problems Paternal Aunt        Physical Exam:     Vitals:   Blood Pressure: 135/67 (21 1511)  Pulse: 104 (21 1511)  Temperature: 97 8 °F (36 6 °C) (21 1304)  Temp Source: Tympanic (02/05/21 1304)  Respirations: 20 (02/05/21 1511)  Height: 5' 3" (160 cm) (02/05/21 1304)  Weight - Scale: 68 kg (150 lb) (02/05/21 1304)  SpO2: 96 % (02/05/21 1511)    Physical Exam  Constitutional:       General: She is not in acute distress  Appearance: She is well-developed  She is not diaphoretic  HENT:      Head: Normocephalic and atraumatic  Nose: Nose normal       Mouth/Throat:      Pharynx: No oropharyngeal exudate  Eyes:      General: No scleral icterus  Right eye: No discharge  Left eye: No discharge  Conjunctiva/sclera: Conjunctivae normal    Neck:      Musculoskeletal: Normal range of motion and neck supple  Thyroid: No thyromegaly  Vascular: No JVD  Cardiovascular:      Rate and Rhythm: Normal rate and regular rhythm  Heart sounds: Normal heart sounds  No murmur  No friction rub  No gallop  Pulmonary:      Effort: Pulmonary effort is normal  No respiratory distress  Breath sounds: Normal breath sounds  No wheezing or rales  Chest:      Chest wall: No tenderness  Abdominal:      General: Bowel sounds are normal  There is no distension  Palpations: Abdomen is soft  Tenderness: There is no abdominal tenderness  There is no guarding or rebound  Musculoskeletal: Normal range of motion  General: No tenderness or deformity  Skin:     General: Skin is warm and dry  Findings: No erythema or rash  Neurological:      Mental Status: She is alert  Mental status is at baseline  Cranial Nerves: No cranial nerve deficit  Sensory: No sensory deficit  Motor: No abnormal muscle tone  Coordination: Coordination normal          (    Additional Data:     Lab Results: I have personally reviewed pertinent reports        Results from last 7 days   Lab Units 02/05/21  1325   WBC Thousand/uL 18 83*   HEMOGLOBIN g/dL 10 6*   HEMATOCRIT % 33 5*   PLATELETS Thousands/uL 317   BANDS PCT % 28*   LYMPHO PCT % 3* MONO PCT % 1*   EOS PCT % 0     Results from last 7 days   Lab Units 02/05/21  1325   SODIUM mmol/L 137   POTASSIUM mmol/L 5 0   CHLORIDE mmol/L 101   CO2 mmol/L 26   BUN mg/dL 35*   CREATININE mg/dL 2 34*   ANION GAP mmol/L 10   CALCIUM mg/dL 10 2*   ALBUMIN g/dL 2 9*   TOTAL BILIRUBIN mg/dL 0 60   ALK PHOS U/L 94   ALT U/L 115*   AST U/L 155*   GLUCOSE RANDOM mg/dL 172*     Results from last 7 days   Lab Units 02/05/21  1325   INR  1 18             Results from last 7 days   Lab Units 02/05/21  1325   LACTIC ACID mmol/L 2 0       Imaging: I have personally reviewed pertinent reports  CT abdomen pelvis with contrast   Final Result by Vaishnavi Villalobos MD (02/05 1423)      1  Status post replacement of right ureteral stent with significant improvement of previous hydronephrosis  2  Single 1 to 2 mm calculus remaining in the right ureter adjacent to the stent but significantly diminished ureteral calculus burden since prior  3  Right kidney lower pole calculi as above   4  Urothelial enhancement of the renal pelvis, periureteral edema, bladder wall enhancement, suspicious for urinary tract infection  5  Unchanged appearance of left adnexal cyst, hiatal hernia, abdominal aortic aneurysm, and left renal cyst            Workstation performed: [id number]             EKG, Pathology, and Other Studies Reviewed on Admission:   · EKG: reviewed    Allscripts / Epic Records Reviewed: Yes     ** Please Note: This note has been constructed using a voice recognition system   **

## 2021-02-05 NOTE — ASSESSMENT & PLAN NOTE
Evident by WBC count of 18 K, tachycardia, hypotension with source being UTI  Follow-up blood cultures which are currently pending  Plan as outlined above

## 2021-02-05 NOTE — PLAN OF CARE
Problem: Potential for Falls  Goal: Patient will remain free of falls  Description: INTERVENTIONS:  - Assess patient frequently for physical needs  -  Identify cognitive and physical deficits and behaviors that affect risk of falls    -  [place] fall precautions as indicated by assessment   - Educate patient/family on patient safety including physical limitations  - Instruct patient to call for assistance with activity based on assessment  - Modify environment to reduce risk of injury  - Consider OT/PT consult to assist with strengthening/mobility  Outcome: Progressing     Problem: PAIN - ADULT  Goal: Verbalizes/displays adequate comfort level or baseline comfort level  Description: Interventions:  - Encourage patient to monitor pain and request assistance  - Assess pain using appropriate pain scale  - Administer analgesics based on type and severity of pain and evaluate response  - Implement non-pharmacological measures as appropriate and evaluate response  - Consider cultural and social influences on pain and pain management  - Notify physician/advanced practitioner if interventions unsuccessful or patient reports new pain  Outcome: Progressing     Problem: INFECTION - ADULT  Goal: Absence or prevention of progression during hospitalization  Description: INTERVENTIONS:  - Assess and monitor for signs and symptoms of infection  - Monitor lab/diagnostic results  - Monitor all insertion sites, i e  indwelling lines, tubes, and drains  - Monitor endotracheal if appropriate and nasal secretions for changes in amount and color  - [place] appropriate cooling/warming therapies per order  - Administer medications as ordered  - Instruct and encourage patient and family to use good hand hygiene technique  - Identify and instruct in appropriate isolation precautions for identified infection/condition  Outcome: Progressing  Goal: Absence of fever/infection during neutropenic period  Description: INTERVENTIONS:  - Monitor WBC    Outcome: Progressing     Problem: SAFETY ADULT  Goal: Patient will remain free of falls  Description: INTERVENTIONS:  - Assess patient frequently for physical needs  -  Identify cognitive and physical deficits and behaviors that affect risk of falls    -  [place] fall precautions as indicated by assessment   - Educate patient/family on patient safety including physical limitations  - Instruct patient to call for assistance with activity based on assessment  - Modify environment to reduce risk of injury  - Consider OT/PT consult to assist with strengthening/mobility  Outcome: Progressing  Goal: Maintain or return to baseline ADL function  Description: INTERVENTIONS:  -  Assess patient's ability to carry out ADLs; assess patient's baseline for ADL function and identify physical deficits which impact ability to perform ADLs (bathing, care of mouth/teeth, toileting, grooming, dressing, etc )  - Assess/evaluate cause of self-care deficits   - Assess range of motion  - Assess patient's mobility; develop plan if impaired  - Assess patient's need for assistive devices and provide as appropriate  - Encourage maximum independence but intervene and supervise when necessary  - Involve family in performance of ADLs  - Assess for home care needs following discharge   - Consider OT consult to assist with ADL evaluation and planning for discharge  - Provide patient education as appropriate  Outcome: Progressing  Goal: Maintain or return mobility status to optimal level  Description: INTERVENTIONS:  - Assess patient's baseline mobility status (ambulation, transfers, stairs, etc )    - Identify cognitive and physical deficits and behaviors that affect mobility  - Identify mobility aids required to assist with transfers and/or ambulation (gait belt, sit-to-stand, lift, walker, cane, etc )  - [place] fall precautions as indicated by assessment  - Record patient progress and toleration of activity level on Mobility SBAR; progress patient to next Phase/Stage  - Instruct patient to call for assistance with activity based on assessment  - Consider rehabilitation consult to assist with strengthening/weightbearing, etc   Outcome: Progressing     Problem: DISCHARGE PLANNING  Goal: Discharge to home or other facility with appropriate resources  Description: INTERVENTIONS:  - Identify barriers to discharge w/patient and caregiver  - Arrange for needed discharge resources and transportation as appropriate  - Identify discharge learning needs (meds, wound care, etc )  - Arrange for interpretive services to assist at discharge as needed  - Refer to Case Management Department for coordinating discharge planning if the patient needs post-hospital services based on physician/advanced practitioner order or complex needs related to functional status, cognitive ability, or social support system  Outcome: Progressing     Problem: Knowledge Deficit  Goal: Patient/family/caregiver demonstrates understanding of disease process, treatment plan, medications, and discharge instructions  Description: Complete learning assessment and assess knowledge base  Interventions:  - Provide teaching at level of understanding  - Provide teaching via preferred learning methods  Outcome: Progressing     Problem: Nutrition/Hydration-ADULT  Goal: Nutrient/Hydration intake appropriate for improving, restoring or maintaining nutritional needs  Description: Monitor and assess patient's nutrition/hydration status for malnutrition  Collaborate with interdisciplinary team and initiate plan and interventions as ordered  Monitor patient's weight and dietary intake as ordered or per policy  Utilize nutrition screening tool and intervene as necessary  Determine patient's food preferences and provide high-protein, high-caloric foods as appropriate       INTERVENTIONS:  - Monitor oral intake, urinary output, labs, and treatment plans  - Assess nutrition and hydration status and recommend course of action  - Evaluate amount of meals eaten  - Assist patient with eating if necessary   - Allow adequate time for meals  - Recommend/ encourage appropriate diets, oral nutritional supplements, and vitamin/mineral supplements  - Order, calculate, and assess calorie counts as needed  - Recommend, monitor, and adjust tube feedings and TPN/PPN based on assessed needs  - Assess need for intravenous fluids  - Provide specific nutrition/hydration education as appropriate  - Include patient/family/caregiver in decisions related to nutrition  Outcome: Progressing

## 2021-02-06 LAB
ANION GAP SERPL CALCULATED.3IONS-SCNC: 7 MMOL/L (ref 4–13)
BASOPHILS # BLD MANUAL: 0 THOUSAND/UL (ref 0–0.1)
BASOPHILS NFR MAR MANUAL: 0 % (ref 0–1)
BUN SERPL-MCNC: 37 MG/DL (ref 5–25)
CALCIUM SERPL-MCNC: 8.4 MG/DL (ref 8.3–10.1)
CHLORIDE SERPL-SCNC: 106 MMOL/L (ref 100–108)
CO2 SERPL-SCNC: 24 MMOL/L (ref 21–32)
CREAT SERPL-MCNC: 1.9 MG/DL (ref 0.6–1.3)
EOSINOPHIL # BLD MANUAL: 0 THOUSAND/UL (ref 0–0.4)
EOSINOPHIL NFR BLD MANUAL: 0 % (ref 0–6)
ERYTHROCYTE [DISTWIDTH] IN BLOOD BY AUTOMATED COUNT: 13.8 % (ref 11.6–15.1)
GFR SERPL CREATININE-BSD FRML MDRD: 25 ML/MIN/1.73SQ M
GLUCOSE SERPL-MCNC: 108 MG/DL (ref 65–140)
HCT VFR BLD AUTO: 29.4 % (ref 34.8–46.1)
HGB BLD-MCNC: 9.2 G/DL (ref 11.5–15.4)
LYMPHOCYTES # BLD AUTO: 0.68 THOUSAND/UL (ref 0.6–4.47)
LYMPHOCYTES # BLD AUTO: 6 % (ref 14–44)
MCH RBC QN AUTO: 30.3 PG (ref 26.8–34.3)
MCHC RBC AUTO-ENTMCNC: 31.3 G/DL (ref 31.4–37.4)
MCV RBC AUTO: 97 FL (ref 82–98)
MONOCYTES # BLD AUTO: 0.11 THOUSAND/UL (ref 0–1.22)
MONOCYTES NFR BLD: 1 % (ref 4–12)
NEUTROPHILS # BLD MANUAL: 10.56 THOUSAND/UL (ref 1.85–7.62)
NEUTS BAND NFR BLD MANUAL: 13 % (ref 0–8)
NEUTS SEG NFR BLD AUTO: 80 % (ref 43–75)
NRBC BLD AUTO-RTO: 0 /100 WBCS
PLATELET # BLD AUTO: 226 THOUSANDS/UL (ref 149–390)
PLATELET BLD QL SMEAR: ADEQUATE
PMV BLD AUTO: 9.6 FL (ref 8.9–12.7)
POTASSIUM SERPL-SCNC: 4.7 MMOL/L (ref 3.5–5.3)
PROCALCITONIN SERPL-MCNC: 2.4 NG/ML
RBC # BLD AUTO: 3.04 MILLION/UL (ref 3.81–5.12)
SODIUM SERPL-SCNC: 137 MMOL/L (ref 136–145)
TOTAL CELLS COUNTED SPEC: 100
WBC # BLD AUTO: 11.35 THOUSAND/UL (ref 4.31–10.16)

## 2021-02-06 PROCEDURE — 99232 SBSQ HOSP IP/OBS MODERATE 35: CPT | Performed by: INTERNAL MEDICINE

## 2021-02-06 PROCEDURE — 85027 COMPLETE CBC AUTOMATED: CPT | Performed by: INTERNAL MEDICINE

## 2021-02-06 PROCEDURE — 84145 PROCALCITONIN (PCT): CPT | Performed by: PHYSICIAN ASSISTANT

## 2021-02-06 PROCEDURE — 85007 BL SMEAR W/DIFF WBC COUNT: CPT | Performed by: INTERNAL MEDICINE

## 2021-02-06 PROCEDURE — 80048 BASIC METABOLIC PNL TOTAL CA: CPT | Performed by: INTERNAL MEDICINE

## 2021-02-06 RX ORDER — ALPRAZOLAM 0.25 MG/1
0.25 TABLET ORAL ONCE
Status: COMPLETED | OUTPATIENT
Start: 2021-02-06 | End: 2021-02-06

## 2021-02-06 RX ORDER — AMLODIPINE BESYLATE 2.5 MG/1
2.5 TABLET ORAL DAILY
Status: DISCONTINUED | OUTPATIENT
Start: 2021-02-06 | End: 2021-02-07

## 2021-02-06 RX ORDER — PILOCARPINE HYDROCHLORIDE 5 MG/1
5 TABLET, FILM COATED ORAL 4 TIMES DAILY
Status: DISCONTINUED | OUTPATIENT
Start: 2021-02-06 | End: 2021-02-06

## 2021-02-06 RX ORDER — HYDRALAZINE HYDROCHLORIDE 20 MG/ML
5 INJECTION INTRAMUSCULAR; INTRAVENOUS EVERY 6 HOURS PRN
Status: DISCONTINUED | OUTPATIENT
Start: 2021-02-06 | End: 2021-02-07

## 2021-02-06 RX ORDER — METOCLOPRAMIDE HYDROCHLORIDE 5 MG/ML
10 INJECTION INTRAMUSCULAR; INTRAVENOUS ONCE
Status: COMPLETED | OUTPATIENT
Start: 2021-02-06 | End: 2021-02-06

## 2021-02-06 RX ORDER — PILOCARPINE HYDROCHLORIDE 5 MG/1
5 TABLET, FILM COATED ORAL EVERY 4 HOURS
Status: DISCONTINUED | OUTPATIENT
Start: 2021-02-06 | End: 2021-02-10 | Stop reason: HOSPADM

## 2021-02-06 RX ADMIN — HYDRALAZINE HYDROCHLORIDE 5 MG: 20 INJECTION, SOLUTION INTRAMUSCULAR; INTRAVENOUS at 17:09

## 2021-02-06 RX ADMIN — HEPARIN SODIUM 5000 UNITS: 5000 INJECTION INTRAVENOUS; SUBCUTANEOUS at 21:09

## 2021-02-06 RX ADMIN — PILOCARPINE HYDROCHLORIDE 5 MG: 5 TABLET, FILM COATED ORAL at 21:09

## 2021-02-06 RX ADMIN — ASPIRIN 81 MG: 81 TABLET, CHEWABLE ORAL at 09:34

## 2021-02-06 RX ADMIN — PAROXETINE HYDROCHLORIDE 10 MG: 20 TABLET, FILM COATED ORAL at 09:34

## 2021-02-06 RX ADMIN — PILOCARPINE HYDROCHLORIDE 5 MG: 5 TABLET, FILM COATED ORAL at 16:52

## 2021-02-06 RX ADMIN — CEFEPIME HYDROCHLORIDE 1000 MG: 2 INJECTION, POWDER, FOR SOLUTION INTRAVENOUS at 09:35

## 2021-02-06 RX ADMIN — PILOCARPINE HYDROCHLORIDE 5 MG: 5 TABLET, FILM COATED ORAL at 06:34

## 2021-02-06 RX ADMIN — SODIUM CHLORIDE 100 ML/HR: 0.9 INJECTION, SOLUTION INTRAVENOUS at 19:33

## 2021-02-06 RX ADMIN — ACETAMINOPHEN 650 MG: 325 TABLET ORAL at 05:54

## 2021-02-06 RX ADMIN — HEPARIN SODIUM 5000 UNITS: 5000 INJECTION INTRAVENOUS; SUBCUTANEOUS at 14:44

## 2021-02-06 RX ADMIN — PILOCARPINE HYDROCHLORIDE 5 MG: 5 TABLET, FILM COATED ORAL at 12:14

## 2021-02-06 RX ADMIN — HEPARIN SODIUM 5000 UNITS: 5000 INJECTION INTRAVENOUS; SUBCUTANEOUS at 05:54

## 2021-02-06 RX ADMIN — ALPRAZOLAM 0.25 MG: 0.25 TABLET ORAL at 17:09

## 2021-02-06 RX ADMIN — METOCLOPRAMIDE HYDROCHLORIDE 10 MG: 5 INJECTION INTRAMUSCULAR; INTRAVENOUS at 14:44

## 2021-02-06 RX ADMIN — SALINE NASAL SPRAY 1 SPRAY: 1.5 SOLUTION NASAL at 18:30

## 2021-02-06 RX ADMIN — SODIUM CHLORIDE 100 ML/HR: 0.9 INJECTION, SOLUTION INTRAVENOUS at 03:58

## 2021-02-06 RX ADMIN — PILOCARPINE HYDROCHLORIDE 5 MG: 5 TABLET, FILM COATED ORAL at 23:55

## 2021-02-06 RX ADMIN — CEFEPIME HYDROCHLORIDE 1000 MG: 2 INJECTION, POWDER, FOR SOLUTION INTRAVENOUS at 21:09

## 2021-02-06 RX ADMIN — AMLODIPINE BESYLATE 2.5 MG: 2.5 TABLET ORAL at 17:09

## 2021-02-06 RX ADMIN — ONDANSETRON 4 MG: 2 INJECTION INTRAMUSCULAR; INTRAVENOUS at 13:03

## 2021-02-06 NOTE — ASSESSMENT & PLAN NOTE
Presented with hypotension, chills, rigors, leukocytosis of 18 K  Urinalysis showed bacteria, nitrates, leukocytes  Received IV dose of Zosyn emergency department  Was switched over to cefepime and continue  Leukocytosis improving  Follow-up urine culture and sensitivity  Urology consult

## 2021-02-06 NOTE — ASSESSMENT & PLAN NOTE
Baseline creatinine approximately 1  Presented creatinine 2 3  Improving with IV hydration  Creatinine 1 9 today  Continue IV fluid and recheck BMP tomorrow  Consider nephrology evaluation if does not resolve    Monitor intake output

## 2021-02-06 NOTE — PROGRESS NOTES
Progress Note - Jose Parra 1945, 76 y o  female MRN: [id number]    Unit/Bed#: ENDO 06-02 Encounter: [id number]    Primary Care Provider: Kirill Bojorquez PA-C   Date and time admitted to hospital: 2/5/2021  1:10 PM        Acute kidney injury [facility])  Assessment & Plan  Baseline creatinine approximately 1  Presented creatinine 2 3  Improving with IV hydration  Creatinine 1 9 today  Continue IV fluid and recheck BMP tomorrow  Consider nephrology evaluation if does not resolve  Monitor intake output    * UTI (urinary tract infection)  Assessment & Plan  Presented with hypotension, chills, rigors, leukocytosis of 18 K  Urinalysis showed bacteria, nitrates, leukocytes  Received IV dose of Zosyn emergency department  Was switched over to cefepime and continue  Leukocytosis improving  Follow-up urine culture and sensitivity  Urology consult  Sepsis ([facility] [address] )  Assessment & Plan  Evident by WBC count of 18 K, tachycardia, hypotension with source being UTI  Follow-up blood cultures which are currently pending  Plan as outlined above    Hyperlipidemia  Assessment & Plan  Hold statin therapy in the setting of transaminitis  Repeat LFTs in a m  Benign hypertension  Assessment & Plan  Hold home ARB in the setting of LINDSEY  BP within normal limits  Continue monitor    Nephrolithiasis  Assessment & Plan  Patient underwent cystoscopy yesterday with right ureteral stent placement  Will have urology follow-up as outlined above      VTE Pharmacologic Prophylaxis:   Pharmacologic: Heparin  Mechanical VTE Prophylaxis in Place: Yes    Patient Centered Rounds: I have performed bedside rounds with nursing staff today  Discussions with Specialists or Other Care Team Provider:     Education and Discussions with Family / Patient:  Patient    Time Spent for Care: More than 50% of total time spent on counseling and coordination of care as described above      Current Length of Stay: 1 day(s)    Current Patient Status: Inpatient   Certification Statement: The patient will continue to require additional inpatient hospital stay due to See above    Discharge Plan:  24-48 hours    Code Status: Level 1 - Full Code      Subjective:   I have seen and examined the patient bedside this morning  Patient feels much better today  Had fever last night  Denies any pain now  Renal function also improving  Still having nausea off and on     Objective:     Vitals:   Temp (24hrs), Av 2 °F (37 3 °C), Min:98 1 °F (36 7 °C), Max:102 °F (38 9 °C)    Temp:  [98 1 °F (36 7 °C)-102 °F (38 9 °C)] 98 9 °F (37 2 °C)  HR:  [] 103  Resp:  [14-18] 15  BP: (125-185)/(68-75) 185/70  SpO2:  [92 %-100 %] 100 %  Body mass index is 28 51 kg/m²  Input and Output Summary (last 24 hours):        Intake/Output Summary (Last 24 hours) at 2021 1547  Last data filed at 2021 0601  Gross per 24 hour   Intake 3035 ml   Output --   Net 3035 ml       Physical Exam:     Physical Exam  General- Awake, alert and oriented x 3, looks comfortable  CVS- Normal S1/ S2, Regular rate and rhythm  Respiratory system- B/L clear breath sounds  Abdomen- Soft, Non distended, no tenderness, Bowel sound- present  Genitourinary- No suprapubic tenderness, No CVA tenderness  Musculoskeletal- No gross deformity  CNS- No acute focal neurologic deficit noted    Additional Data:     Labs:    Results from last 7 days   Lab Units 21  0557   WBC Thousand/uL 11 35*   HEMOGLOBIN g/dL 9 2*   HEMATOCRIT % 29 4*   PLATELETS Thousands/uL 226   BANDS PCT % 13*   LYMPHO PCT % 6*   MONO PCT % 1*   EOS PCT % 0     Results from last 7 days   Lab Units 21  0557 21  1325   SODIUM mmol/L 137 137   POTASSIUM mmol/L 4 7 5 0   CHLORIDE mmol/L 106 101   CO2 mmol/L 24 26   BUN mg/dL 37* 35*   CREATININE mg/dL 1 90* 2 34*   ANION GAP mmol/L 7 10   CALCIUM mg/dL 8 4 10 2*   ALBUMIN g/dL  --  2 9*   TOTAL BILIRUBIN mg/dL  --  0 60   ALK PHOS U/L  --  94   ALT U/L  --  115*   AST U/L --  155*   GLUCOSE RANDOM mg/dL 108 172*     Results from last 7 days   Lab Units 02/05/21  1325   INR  1 18             Results from last 7 days   Lab Units 02/06/21  0557 02/05/21  1325   LACTIC ACID mmol/L  --  2 0   PROCALCITONIN ng/ml 2 40* 3 53*           * I Have Reviewed All Lab Data Listed Above  * Additional Pertinent Lab Tests Reviewed: All Labs Within Last 24 Hours Reviewed    Imaging:    Imaging Reports Reviewed Today Include:   Imaging Personally Reviewed by Myself Includes:      Recent Cultures (last 7 days):     Results from last 7 days   Lab Units 02/05/21  1441 02/05/21  1430 02/05/21  1325   BLOOD CULTURE   --  Received in Microbiology Lab  Culture in Progress  Received in Microbiology Lab  Culture in Progress  URINE CULTURE  Culture too young- will reincubate  --   --        Last 24 Hours Medication List:   Current Facility-Administered Medications   Medication Dose Route Frequency Provider Last Rate    acetaminophen  650 mg Oral Q6H PRN Jose Rafael Vargas MD      aspirin  81 mg Oral Daily Jose Rafael Vargas MD      cefepime  1,000 mg Intravenous Q12H Jose Rafael Vargas MD 1,000 mg (02/06/21 0935)    docusate sodium  100 mg Oral BID Jose Rafael Vargas MD      heparin (porcine)  5,000 Units Subcutaneous Q8H [address] Jose Rafael Vargas MD      ondansetron  4 mg Intravenous Q6H PRN Jose Rafael Vargas MD      PARoxetine  10 mg Oral Daily Jose Rafael Vargas MD      pilocarpine  5 mg Oral 4x Daily SILVER Pierson      sodium chloride  1 spray Each Nare Q2H PRN SILVER Pierson      sodium chloride  100 mL/hr Intravenous Continuous Ophelia Park PA-C 100 mL/hr (02/06/21 0358)        Today, Patient Was Seen By: Anne Marie Arellano MD    ** Please Note: Dictation voice to text software may have been used in the creation of this document   **

## 2021-02-07 PROBLEM — K62.5 RECTAL BLEEDING: Status: ACTIVE | Noted: 2021-02-07

## 2021-02-07 PROBLEM — K92.2 ACUTE GI BLEEDING: Status: ACTIVE | Noted: 2021-02-07

## 2021-02-07 LAB
ABO GROUP BLD: NORMAL
ALBUMIN SERPL BCP-MCNC: 1.9 G/DL (ref 3.5–5)
ALP SERPL-CCNC: 193 U/L (ref 46–116)
ALT SERPL W P-5'-P-CCNC: 108 U/L (ref 12–78)
ANION GAP SERPL CALCULATED.3IONS-SCNC: 9 MMOL/L (ref 4–13)
AST SERPL W P-5'-P-CCNC: 99 U/L (ref 5–45)
BILIRUB SERPL-MCNC: 0.3 MG/DL (ref 0.2–1)
BLD GP AB SCN SERPL QL: NEGATIVE
BUN SERPL-MCNC: 30 MG/DL (ref 5–25)
CALCIUM ALBUM COR SERPL-MCNC: 9.5 MG/DL (ref 8.3–10.1)
CALCIUM SERPL-MCNC: 7.8 MG/DL (ref 8.3–10.1)
CHLORIDE SERPL-SCNC: 107 MMOL/L (ref 100–108)
CO2 SERPL-SCNC: 22 MMOL/L (ref 21–32)
CREAT SERPL-MCNC: 1.28 MG/DL (ref 0.6–1.3)
ERYTHROCYTE [DISTWIDTH] IN BLOOD BY AUTOMATED COUNT: 13.9 % (ref 11.6–15.1)
GFR SERPL CREATININE-BSD FRML MDRD: 41 ML/MIN/1.73SQ M
GLUCOSE SERPL-MCNC: 113 MG/DL (ref 65–140)
HCT VFR BLD AUTO: 24.7 % (ref 34.8–46.1)
HCT VFR BLD AUTO: 28.4 % (ref 34.8–46.1)
HCT VFR BLD AUTO: 29.8 % (ref 34.8–46.1)
HGB BLD-MCNC: 7.8 G/DL (ref 11.5–15.4)
HGB BLD-MCNC: 8.9 G/DL (ref 11.5–15.4)
HGB BLD-MCNC: 9.7 G/DL (ref 11.5–15.4)
MCH RBC QN AUTO: 29.8 PG (ref 26.8–34.3)
MCHC RBC AUTO-ENTMCNC: 31.6 G/DL (ref 31.4–37.4)
MCV RBC AUTO: 94 FL (ref 82–98)
NRBC BLD AUTO-RTO: 0 /100 WBCS
PLATELET # BLD AUTO: 228 THOUSANDS/UL (ref 149–390)
PMV BLD AUTO: 9.7 FL (ref 8.9–12.7)
POTASSIUM SERPL-SCNC: 4 MMOL/L (ref 3.5–5.3)
PROT SERPL-MCNC: 6 G/DL (ref 6.4–8.2)
RBC # BLD AUTO: 2.62 MILLION/UL (ref 3.81–5.12)
RH BLD: POSITIVE
SODIUM SERPL-SCNC: 138 MMOL/L (ref 136–145)
SPECIMEN EXPIRATION DATE: NORMAL
WBC # BLD AUTO: 7.72 THOUSAND/UL (ref 4.31–10.16)

## 2021-02-07 PROCEDURE — C9113 INJ PANTOPRAZOLE SODIUM, VIA: HCPCS | Performed by: NURSE PRACTITIONER

## 2021-02-07 PROCEDURE — 99222 1ST HOSP IP/OBS MODERATE 55: CPT | Performed by: INTERNAL MEDICINE

## 2021-02-07 PROCEDURE — 99233 SBSQ HOSP IP/OBS HIGH 50: CPT | Performed by: INTERNAL MEDICINE

## 2021-02-07 PROCEDURE — 86900 BLOOD TYPING SEROLOGIC ABO: CPT | Performed by: NURSE PRACTITIONER

## 2021-02-07 PROCEDURE — 85027 COMPLETE CBC AUTOMATED: CPT | Performed by: INTERNAL MEDICINE

## 2021-02-07 PROCEDURE — 86923 COMPATIBILITY TEST ELECTRIC: CPT

## 2021-02-07 PROCEDURE — 80053 COMPREHEN METABOLIC PANEL: CPT | Performed by: INTERNAL MEDICINE

## 2021-02-07 PROCEDURE — P9016 RBC LEUKOCYTES REDUCED: HCPCS

## 2021-02-07 PROCEDURE — 85018 HEMOGLOBIN: CPT | Performed by: INTERNAL MEDICINE

## 2021-02-07 PROCEDURE — 86850 RBC ANTIBODY SCREEN: CPT | Performed by: NURSE PRACTITIONER

## 2021-02-07 PROCEDURE — 85014 HEMATOCRIT: CPT | Performed by: INTERNAL MEDICINE

## 2021-02-07 PROCEDURE — 30233N1 TRANSFUSION OF NONAUTOLOGOUS RED BLOOD CELLS INTO PERIPHERAL VEIN, PERCUTANEOUS APPROACH: ICD-10-PCS | Performed by: INTERNAL MEDICINE

## 2021-02-07 PROCEDURE — 86901 BLOOD TYPING SEROLOGIC RH(D): CPT | Performed by: NURSE PRACTITIONER

## 2021-02-07 RX ORDER — ALPRAZOLAM 0.25 MG/1
0.25 TABLET ORAL 3 TIMES DAILY PRN
Status: DISCONTINUED | OUTPATIENT
Start: 2021-02-07 | End: 2021-02-10 | Stop reason: HOSPADM

## 2021-02-07 RX ORDER — POLYETHYLENE GLYCOL 3350, SODIUM CHLORIDE, SODIUM BICARBONATE, POTASSIUM CHLORIDE 420; 11.2; 5.72; 1.48 G/4L; G/4L; G/4L; G/4L
4000 POWDER, FOR SOLUTION ORAL ONCE
Status: COMPLETED | OUTPATIENT
Start: 2021-02-07 | End: 2021-02-07

## 2021-02-07 RX ORDER — PANTOPRAZOLE SODIUM 40 MG/1
40 INJECTION, POWDER, FOR SOLUTION INTRAVENOUS EVERY 12 HOURS SCHEDULED
Status: DISCONTINUED | OUTPATIENT
Start: 2021-02-07 | End: 2021-02-10 | Stop reason: HOSPADM

## 2021-02-07 RX ORDER — AMLODIPINE BESYLATE 2.5 MG/1
2.5 TABLET ORAL DAILY
Status: DISCONTINUED | OUTPATIENT
Start: 2021-02-07 | End: 2021-02-07

## 2021-02-07 RX ADMIN — CEFEPIME HYDROCHLORIDE 1000 MG: 2 INJECTION, POWDER, FOR SOLUTION INTRAVENOUS at 12:44

## 2021-02-07 RX ADMIN — PILOCARPINE HYDROCHLORIDE 5 MG: 5 TABLET, FILM COATED ORAL at 21:52

## 2021-02-07 RX ADMIN — PANTOPRAZOLE SODIUM 40 MG: 40 INJECTION, POWDER, FOR SOLUTION INTRAVENOUS at 06:29

## 2021-02-07 RX ADMIN — CEFEPIME HYDROCHLORIDE 1000 MG: 2 INJECTION, POWDER, FOR SOLUTION INTRAVENOUS at 21:52

## 2021-02-07 RX ADMIN — HYDRALAZINE HYDROCHLORIDE 5 MG: 20 INJECTION, SOLUTION INTRAMUSCULAR; INTRAVENOUS at 06:29

## 2021-02-07 RX ADMIN — POLYETHYLENE GLYCOL 3350, SODIUM CHLORIDE, SODIUM BICARBONATE AND POTASSIUM CHLORIDE WITH LEMON FLAVOR 4000 ML: 420; 11.2; 5.72; 1.48 POWDER, FOR SOLUTION ORAL at 16:56

## 2021-02-07 RX ADMIN — SODIUM CHLORIDE 100 ML/HR: 0.9 INJECTION, SOLUTION INTRAVENOUS at 07:02

## 2021-02-07 RX ADMIN — PILOCARPINE HYDROCHLORIDE 5 MG: 5 TABLET, FILM COATED ORAL at 17:20

## 2021-02-07 RX ADMIN — PANTOPRAZOLE SODIUM 40 MG: 40 INJECTION, POWDER, FOR SOLUTION INTRAVENOUS at 21:52

## 2021-02-07 RX ADMIN — PILOCARPINE HYDROCHLORIDE 5 MG: 5 TABLET, FILM COATED ORAL at 08:51

## 2021-02-07 RX ADMIN — SODIUM CHLORIDE 125 ML/HR: 0.9 INJECTION, SOLUTION INTRAVENOUS at 23:49

## 2021-02-07 RX ADMIN — PILOCARPINE HYDROCHLORIDE 5 MG: 5 TABLET, FILM COATED ORAL at 05:36

## 2021-02-07 RX ADMIN — SALINE NASAL SPRAY 1 SPRAY: 1.5 SOLUTION NASAL at 23:39

## 2021-02-07 RX ADMIN — HEPARIN SODIUM 5000 UNITS: 5000 INJECTION INTRAVENOUS; SUBCUTANEOUS at 05:36

## 2021-02-07 RX ADMIN — ACETAMINOPHEN 650 MG: 325 TABLET ORAL at 23:46

## 2021-02-07 RX ADMIN — PAROXETINE HYDROCHLORIDE 10 MG: 20 TABLET, FILM COATED ORAL at 08:47

## 2021-02-07 RX ADMIN — PILOCARPINE HYDROCHLORIDE 5 MG: 5 TABLET, FILM COATED ORAL at 12:43

## 2021-02-07 NOTE — PROGRESS NOTES
Progress Note - Willow Morrissey 1945, 76 y o  female MRN: [id number]    Unit/Bed#: ENDO 06-02 Encounter: [id number]    Primary Care Provider: Abdoul Blackwell PA-C   Date and time admitted to hospital: 2/5/2021  1:10 PM        Acute kidney injury [facility])  Assessment & Plan  Baseline creatinine approximately 1  Presented creatinine 2 3  Improving with IV hydration  Creatinine 1 28 today  Now complicated with GI bleeding  Continue IV hydration  Can restart Benicar since acute kidney injury improving    UTI (urinary tract infection)  Assessment & Plan  Presented with hypotension, chills, rigors, leukocytosis of 18 K  Urinalysis showed bacteria, nitrates, leukocytes  Received IV dose of Zosyn emergency department  Was switched over to cefepime and continue  Leukocytosis improving  Follow-up urine culture and sensitivity  Urology consult  * Acute GI bleeding  Assessment & Plan  This morning patient started having bright red blood per rectum  One episode early this morning, fresh blood mixed with stool  Later on she had 3 further episode  Hemoglobin dropped to 7 8 from 10 2 on admission  Discussed personally with GI and ICU team   Currently hemodynamically stable  Discussed with daughter  Will give 2 unit PRBC transfusion now  Plan for colonoscopy later on today  Patient has history of diverticulosis  Most likely the source of bleeding  Monitor hemoglobin q 6 hourly  Hold all anticoagulation including aspirin  Sepsis ([address] )  Assessment & Plan  Evident by WBC count of 18 K, tachycardia, hypotension with source being UTI on admission  Blood cultures no growth  Continue IV antibiotics for UTI    Hyperlipidemia  Assessment & Plan  Hold statin therapy in the setting of transaminitis  Improving    Benign hypertension  Assessment & Plan  BP running high  Will hold blood pressure medication because of GI bleed  Hydralazine p r n  Acute kidney injury improved    Once GI bleeding stop, can start on Benicar    Nephrolithiasis  Assessment & Plan  Patient underwent cystoscopy  with right ureteral stent placement before admission  Will have urology follow-up as outlined above        VTE Pharmacologic Prophylaxis:   Pharmacologic: Pharmacologic VTE Prophylaxis contraindicated due to GI bleeding  Mechanical VTE Prophylaxis in Place: Yes    Patient Centered Rounds: I have performed bedside rounds with nursing staff today  Discussions with Specialists or Other Care Team Provider:  GI, ICU    Education and Discussions with Family / Patient:  Patient, called her daughter    Time Spent for Care: More than 50% of total time spent on counseling and coordination of care as described above  Current Length of Stay: 2 day(s)    Current Patient Status: Inpatient   Certification Statement: The patient will continue to require additional inpatient hospital stay due to See above    Discharge Plan:  Not clear    Code Status: Level 1 - Full Code      Subjective:   I have seen and examined the patient bedside this morning  Patient having bright red blood per rectum since this morning  Four episodes so far  Hemoglobin dropped  She is tachycardic  Blood pressure remains high  Denies any chest pain or shortness of breath  She is very anxious  Objective:     Vitals:   Temp (24hrs), Av °F (37 2 °C), Min:98 2 °F (36 8 °C), Max:99 3 °F (37 4 °C)    Temp:  [98 2 °F (36 8 °C)-99 3 °F (37 4 °C)] 98 2 °F (36 8 °C)  HR:  [103-121] 120  Resp:  [15-20] 20  BP: (154-223)/() 154/82  SpO2:  [96 %-100 %] 98 %  Body mass index is 28 51 kg/m²  Input and Output Summary (last 24 hours):        Intake/Output Summary (Last 24 hours) at 2021 1244  Last data filed at 2021 7887  Gross per 24 hour   Intake 3186 67 ml   Output 300 ml   Net 2886 67 ml       Physical Exam:     Physical Exam  General- Awake, alert and oriented x 3, looks anxious  HEENT- Normocephalic, atraumatic  Neck- Supple, no JVD  CVS- Normal S1/ S2, Regular rate and rhythm  Respiratory system- B/L clear breath sounds, no wheezing  Abdomen- Soft, Non distended, no tenderness, Bowel sound- present  Musculoskeletal- No gross deformity  CNS-  No acute focal neurologic deficit noted    Additional Data:     Labs:    Results from last 7 days   Lab Units 02/07/21  0931 02/07/21  0539 02/06/21  0557   WBC Thousand/uL  --  7 72 11 35*   HEMOGLOBIN g/dL 8 9* 7 8* 9 2*   HEMATOCRIT % 28 4* 24 7* 29 4*   PLATELETS Thousands/uL  --  228 226   BANDS PCT %  --   --  13*   LYMPHO PCT %  --   --  6*   MONO PCT %  --   --  1*   EOS PCT %  --   --  0     Results from last 7 days   Lab Units 02/07/21  0539   SODIUM mmol/L 138   POTASSIUM mmol/L 4 0   CHLORIDE mmol/L 107   CO2 mmol/L 22   BUN mg/dL 30*   CREATININE mg/dL 1 28   ANION GAP mmol/L 9   CALCIUM mg/dL 7 8*   ALBUMIN g/dL 1 9*   TOTAL BILIRUBIN mg/dL 0 30   ALK PHOS U/L 193*   ALT U/L 108*   AST U/L 99*   GLUCOSE RANDOM mg/dL 113     Results from last 7 days   Lab Units 02/05/21  1325   INR  1 18             Results from last 7 days   Lab Units 02/06/21  0557 02/05/21  1325   LACTIC ACID mmol/L  --  2 0   PROCALCITONIN ng/ml 2 40* 3 53*           * I Have Reviewed All Lab Data Listed Above  * Additional Pertinent Lab Tests Reviewed: All Labs Within Last 24 Hours Reviewed    Imaging:    Imaging Reports Reviewed Today Include:   Imaging Personally Reviewed by Myself Includes:      Recent Cultures (last 7 days):     Results from last 7 days   Lab Units 02/05/21  1441 02/05/21  1430 02/05/21  1325   BLOOD CULTURE   --  No Growth at 24 hrs  No Growth at 24 hrs     URINE CULTURE  20,000-29,000 cfu/ml Yeast*  --   --        Last 24 Hours Medication List:   Current Facility-Administered Medications   Medication Dose Route Frequency Provider Last Rate    acetaminophen  650 mg Oral Q6H PRN Jose Rafael Vargas MD      ALPRAZolam  0 25 mg Oral TID PRN Jason Childs MD      cefepime  1,000 mg Intravenous Q12H Jose Rafael Vargas MD 1,000 mg (02/07/21 1244)    ondansetron  4 mg Intravenous Q6H PRN Jose Rafael Vargas MD      pantoprazole  40 mg Intravenous Q12H [facility] SILVER Guerrero      PARoxetine  10 mg Oral Daily Jose Rafael Vargas MD      pilocarpine  5 mg Oral Q4H Carol Gaitan MD      polyethylene glycol-electrolytes  4,000 mL Oral Once Cassy Cutter, PA-DASHA      sodium chloride  1 spray Each Nare Q2H PRN SILVER Guerrero      sodium chloride  125 mL/hr Intravenous Continuous Amy Lyle, JACOBNP 125 mL/hr (02/07/21 0848)        Today, Patient Was Seen By: Jason Kumar MD    ** Please Note: Dictation voice to text software may have been used in the creation of this document   **

## 2021-02-07 NOTE — ASSESSMENT & PLAN NOTE
Patient underwent cystoscopy  with right ureteral stent placement before admission  Will have urology follow-up as outlined above

## 2021-02-07 NOTE — QUICK NOTE
Evaluation requested for 4 episodes of BRBPR this morning with associated anemia  Patient is currently hemodynamically stable with /92 with mild tachycardia      Assessment/Plan    Acute blood loss anemia likely secondary to lower GI bleeding    Trend hgb q 6 hours and transfusion for less than hgb 7 0  Consult GI for evaluation for colonoscopy  Hold any anticoagulation, antiplatelets or DVT prophylaxis  Hold antihypertensive medications until definitive source control  Continue maintenance IVF @ 125ml/hr

## 2021-02-07 NOTE — PROGRESS NOTES
Notified by RN, patient just had one episode of dark brown rectal bleeding  · Will discontinue ASA and SQ heparin  · Hg 7 8, was 9 2 yesterday  · Monitor H/H q8h x3   · Type and screen ordered  · NPO  · IV PPI BID  · GI consult    Notified in-house BILLY Darby

## 2021-02-07 NOTE — ASSESSMENT & PLAN NOTE
Baseline creatinine approximately 1  Presented creatinine 2 3  Improving with IV hydration  Creatinine 1 28 today  Now complicated with GI bleeding  Continue IV hydration    Can restart Benicar since acute kidney injury improving

## 2021-02-07 NOTE — NURSING NOTE
Pt reported to RN having a large bloody BM this am  Shortly after first episode pt passed several more large, mostly bloody BMs  Large clots and mucus consistency noted  Pt reported having chills and appeared diaphoretic and pale  At the time of the incident pt was tachycardic (115)  and hypertensive (172/88)  MD notified, 2u of RBCs ordered stat, pt currently receiving first unit and tolerating well  Will continue to monitor

## 2021-02-07 NOTE — ASSESSMENT & PLAN NOTE
This morning patient started having bright red blood per rectum  One episode early this morning, fresh blood mixed with stool  Later on she had 3 further episode  Hemoglobin dropped to 7 8 from 10 2 on admission  Discussed personally with GI and ICU team   Currently hemodynamically stable  Discussed with daughter  Will give 2 unit PRBC transfusion now  Plan for colonoscopy later on today  Patient has history of diverticulosis  Most likely the source of bleeding  Monitor hemoglobin q 6 hourly  Hold all anticoagulation including aspirin

## 2021-02-07 NOTE — ASSESSMENT & PLAN NOTE
Evident by WBC count of 18 K, tachycardia, hypotension with source being UTI on admission  Blood cultures no growth    Continue IV antibiotics for UTI

## 2021-02-07 NOTE — ASSESSMENT & PLAN NOTE
BP running high  Will hold blood pressure medication because of GI bleed  Hydralazine p r n  Acute kidney injury improved    Once GI bleeding stop, can start on Benicar

## 2021-02-07 NOTE — CONSULTS
Consultation - [facility]  Marisol Martin 76 y o  female MRN: [id number]  Unit/Bed#: [facility] 06-02 Encounter: [id number]      Consults "Click on Consult Button"     Reason for Consult / Principal Problem:  Hematochezia    HPI: Marisol Martin is a 76y o  year old female who presents with a past medical history significant for hypertension, hyperlipidemia, nephrolithiasis status post cystoscopy with right ureteral stent placement on Friday, diverticular disease  Patient initially presented to [facility] Emergency Department with generalized weakness and chills  Patient was found to have a urinary tract infection and was admitted for IV antibiotics  This morning patient had 4 large episodes of bright red blood per rectum  Patient reports that prior to this she did not have any episodes of bleeding  She reports she did have some mild cramping prior to her last bowel movement  Patient denies any severe episodes of nausea or vomiting  Patient's last colonoscopy was performed last June with Dr Ida Laurent there were no polyps noted at that time but there was pan diverticular disease  Patient's hemoglobin has dropped significantly since admission  Patient continues to have bleeding  REVIEW OF SYSTEMS:     CONSTITUTIONAL: Denies any fever, chills, or rigors  Good appetite, and no recent weight loss  HEENT: No earache or tinnitus  Denies hearing loss or visual disturbances  CARDIOVASCULAR: No chest pain or palpitations  RESPIRATORY: Denies any cough, hemoptysis, shortness of breath or dyspnea on exertion  GASTROINTESTINAL: As noted in the History of Present Illness  GENITOURINARY: No problems with urination  Denies any hematuria or dysuria  NEUROLOGIC: No dizziness or vertigo, denies headaches  MUSCULOSKELETAL: Denies any muscle or joint pain  SKIN: Denies skin rashes or itching  ENDOCRINE: Denies excessive thirst  Denies intolerance to heat or cold    PSYCHOSOCIAL: Denies depression or anxiety  Denies any recent memory loss  Historical Information   Past Medical History:   Diagnosis Date    Abdominal aortic aneurysm (AAA) ([facility] [address] [facility]     Hyperlipidemia     Hypertension     Kidney stone     Kidney stones     Pneumonia     Sjogren's syndrome ([facility] [address] )      Past Surgical History:   Procedure Laterality Date    CATARACT EXTRACTION      DILATION AND CURETTAGE OF UTERUS      FL RETROGRADE PYELOGRAM  2020    FL RETROGRADE PYELOGRAM  2021    IR NEPHROURETERAL ACCESS FOR UROLOGY PCNL  2020    NC CYSTO/URETERO W/LITHOTRIPSY &INDWELL STENT INSRT Right 2021    Procedure: CYSTOSCOPY URETEROSCOPY WITH LITHOTRIPSY HOLMIUM LASER, RETROGRADE PYELOGRAM AND INSERTION STENT URETERAL;  Surgeon: Sarah Zamudio MD;  Location: [facility];  Service: Urology    NC CYSTO/URETERO/PYELOSCOPY, DX Right 2/15/2017    Procedure: URETEROSCOPY, STENT PLACEMENT ;  Surgeon: Sarah Zamudio MD;  Location: [facility];  Service: Urology    NC PERCUT Malgorzatad CM Right 2/15/2017    Procedure: ACCESS INTERPOLAR CALYX, INSERTED TWO WIRES INTO BLADDER, NEPHROLITHOTOMY  PERCUTANEOUS ;  Surgeon: Sarah Zamudio MD;  Location: [facility];  Service: Urology    NC PERCUT Saytad CM Right 2020    Procedure: NEPHROLITHOTOMY  PERCUTANEOUS (PCNL);   Surgeon: Sarah Zamudio MD;  Location: [facility];  Service: Urology   [facility] SURGERY       Social History   Social History     Substance and Sexual Activity   Alcohol Use Yes    Frequency: Monthly or less    Drinks per session: 1 or 2     Social History     Substance and Sexual Activity   Drug Use No     Social History     Tobacco Use   Smoking Status Former Smoker    Packs/day:  00    Years: 20 00    Pack years: 20 00    Quit date:     Years since quittin 1   Smokeless Tobacco Former User    Quit date:    Tobacco Comment    smokes "very rarely"     Family History   Problem Relation Age of Onset    Cancer Mother     Colon cancer Mother 80        CARCINOMA     Melanoma Mother     Alzheimer's disease Father     Urinary tract infection Sister     Other Sister 39        urinary tract cancer    Cancer Brother         BLADDER    Stroke Maternal Grandfather         CVA    No Known Problems Daughter     No Known Problems Maternal Grandmother     No Known Problems Paternal Grandmother     No Known Problems Sister     No Known Problems Sister     No Known Problems Daughter     No Known Problems Daughter     Lung cancer Maternal Aunt 79    No Known Problems Paternal Aunt     No Known Problems Paternal Aunt        Meds/Allergies     Medications Prior to Admission   Medication    acetaminophen (TYLENOL) 325 mg tablet    acetaminophen (TYLENOL) 325 mg tablet    aspirin 81 mg chewable tablet    Coenzyme Q10 (CO Q 10) 10 MG CAPS    Multiple Vitamins-Minerals (EYE VITAMINS & MINERALS PO)    Multiple Vitamins-Minerals (MULTIVITAMIN ADULT PO)    olmesartan (BENICAR) 40 mg tablet    Omega-3 Fatty Acids (FISH OIL PO)    PARoxetine (PAXIL) 10 mg tablet    phenazopyridine (PYRIDIUM) 200 mg tablet    pilocarpine (SALAGEN) 5 mg tablet    potassium citrate (Urocit-K 10) 10 mEq    rosuvastatin (CRESTOR) 5 mg tablet    tamsulosin (FLOMAX) 0 4 mg    Ascorbic Acid (VITAMIN C) 100 MG tablet    B Complex Vitamins (VITAMIN B COMPLEX) TABS    [] cephalexin (KEFLEX) 500 mg capsule    cholecalciferol (VITAMIN D3) 1,000 units tablet    CINNAMON PO    docusate sodium (COLACE) 100 mg capsule    docusate sodium (COLACE) 100 mg capsule    Lutein 40 MG CAPS    naproxen (NAPROSYN) 500 mg tablet    oxyCODONE (ROXICODONE) 5 mg immediate release tablet     Current Facility-Administered Medications   Medication Dose Route Frequency    acetaminophen (TYLENOL) tablet 650 mg  650 mg Oral Q6H PRN    ALPRAZolam (XANAX) tablet 0 25 mg  0 25 mg Oral TID PRN    cefepime (MAXIPIME) 1,000 mg in dextrose 5 % 50 mL IVPB  1,000 mg Intravenous Q12H    ondansetron (ZOFRAN) injection 4 mg  4 mg Intravenous Q6H PRN    pantoprazole (PROTONIX) injection 40 mg  40 mg Intravenous Q12H [address]    PARoxetine (PAXIL) tablet 10 mg  10 mg Oral Daily    pilocarpine (SALAGEN) tablet 5 mg  5 mg Oral Q4H    sodium chloride (OCEAN) 0 65 % nasal spray 1 spray  1 spray Each Nare Q2H PRN    sodium chloride 0 9 % infusion  125 mL/hr Intravenous Continuous       Allergies   Allergen Reactions    Other Other (See Comments)     "[place] food"- swollen tongue      Zosyn [Piperacillin Sod-Tazobactam So] Hives       Objective   Blood pressure 154/82, pulse (!) 120, temperature 98 2 °F (36 8 °C), temperature source Oral, resp  rate 20, height 5' 3" (1 6 m), weight 73 kg (160 lb 15 oz), SpO2 98 %, not currently breastfeeding  Intake/Output Summary (Last 24 hours) at 2/7/2021 6378  Last data filed at 2/7/2021 9515  Gross per 24 hour   Intake 3186 67 ml   Output 300 ml   Net 2886 67 ml         PHYSICAL EXAM:      General Appearance:   Alert, cooperative, no distress, appears stated age    HEENT:   Normocephalic, atraumatic, anicteric      Neck:  Supple, symmetrical, trachea midline, no adenopathy;    thyroid: no enlargement/tenderness/nodules; no carotid  bruit or JVD    Lungs:   Clear to auscultation bilaterally; no rales, rhonchi or wheezing; respirations unlabored    Heart[de-identified]   S1 and S2 normal; regular rate and rhythm; no murmur, rub, or gallop     Abdomen:   Soft, non-tender, non-distended; normal bowel sounds; no masses, no organomegaly    Genitalia:   Deferred    Rectal:   Deferred    Extremities:  No cyanosis, clubbing or edema    Pulses:  2+ and symmetric all extremities    Skin:  Skin color, texture, turgor normal, no rashes or lesions    Lymph nodes:  No palpable cervical, axillary or inguinal lymphadenopathy        Lab Results:   Admission on 02/05/2021   Component Date Value    WBC 02/05/2021 18 83*    RBC 02/05/2021 3 56*    Hemoglobin 02/05/2021 10 6*    Hematocrit 02/05/2021 33 5*    MCV 02/05/2021 94     MCH 02/05/2021 29 8     MCHC 02/05/2021 31 6     RDW 02/05/2021 13 5     MPV 02/05/2021 9 5     Platelets 95/88/6133 317     nRBC 02/05/2021 0     Sodium 02/05/2021 137     Potassium 02/05/2021 5 0     Chloride 02/05/2021 101     CO2 02/05/2021 26     ANION GAP 02/05/2021 10     BUN 02/05/2021 35*    Creatinine 02/05/2021 2 34*    Glucose 02/05/2021 172*    Calcium 02/05/2021 10 2*    Corrected Calcium 02/05/2021 11 1*    AST 02/05/2021 155*    ALT 02/05/2021 115*    Alkaline Phosphatase 02/05/2021 94     Total Protein 02/05/2021 7 9     Albumin 02/05/2021 2 9*    Total Bilirubin 02/05/2021 0 60     eGFR 02/05/2021 20     LACTIC ACID 02/05/2021 2 0     Procalcitonin 02/05/2021 3 53*    Protime 02/05/2021 15 3*    INR 02/05/2021 1 18     PTT 02/05/2021 31     Blood Culture 02/05/2021 No Growth at 24 hrs   Blood Culture 02/05/2021 No Growth at 24 hrs       Color, UA 02/05/2021 Orange     Clarity, UA 02/05/2021 Cloudy     Specific Gravity, UA 02/05/2021 1 015     pH, UA 02/05/2021 5 5     Leukocytes, UA 02/05/2021 Moderate*    Nitrite, UA 02/05/2021 Positive*    Protein, UA 02/05/2021 >=300*    Glucose, UA 02/05/2021 100 (1/10%)*    Ketones, UA 02/05/2021 Negative     Urobilinogen, UA 02/05/2021 4 0*    Bilirubin, UA 02/05/2021 Moderate*    Blood, UA 02/05/2021 Moderate*    Troponin I 02/05/2021 <0 02     Segmented % 02/05/2021 67     Bands % 02/05/2021 28*    Lymphocytes % 02/05/2021 3*    Monocytes % 02/05/2021 1*    Eosinophils, % 02/05/2021 0     Basophils % 02/05/2021 1     Absolute Neutrophils 02/05/2021 17 89*    Lymphocytes Absolute 02/05/2021 0 56*    Monocytes Absolute 02/05/2021 0 19     Eosinophils Absolute 02/05/2021 0 00     Basophils Absolute 02/05/2021 0 19*    Total Counted 02/05/2021 100     Platelet Estimate 74/20/5044 Adequate     RBC, UA 02/05/2021 0-1  WBC, UA 02/05/2021 30-50*    Epithelial Cells 02/05/2021 Occasional     Bacteria, UA 02/05/2021 Occasional     Hyaline Casts, UA 02/05/2021 0-1*    Urine Culture 02/05/2021 20,000-29,000 cfu/ml Yeast*    Procalcitonin 02/06/2021 2 40*    Sodium 02/06/2021 137     Potassium 02/06/2021 4 7     Chloride 02/06/2021 106     CO2 02/06/2021 24     ANION GAP 02/06/2021 7     BUN 02/06/2021 37*    Creatinine 02/06/2021 1 90*    Glucose 02/06/2021 108     Calcium 02/06/2021 8 4     eGFR 02/06/2021 25     WBC 02/06/2021 11 35*    RBC 02/06/2021 3 04*    Hemoglobin 02/06/2021 9 2*    Hematocrit 02/06/2021 29 4*    MCV 02/06/2021 97     MCH 02/06/2021 30 3     MCHC 02/06/2021 31 3*    RDW 02/06/2021 13 8     MPV 02/06/2021 9 6     Platelets 54/44/3331 226     nRBC 02/06/2021 0     Segmented % 02/06/2021 80*    Bands % 02/06/2021 13*    Lymphocytes % 02/06/2021 6*    Monocytes % 02/06/2021 1*    Eosinophils, % 02/06/2021 0     Basophils % 02/06/2021 0     Absolute Neutrophils 02/06/2021 10 56*    Lymphocytes Absolute 02/06/2021 0 68     Monocytes Absolute 02/06/2021 0 11     Eosinophils Absolute 02/06/2021 0 00     Basophils Absolute 02/06/2021 0 00     Total Counted 02/06/2021 100     Platelet Estimate 52/64/2738 Adequate     WBC 02/07/2021 7 72     RBC 02/07/2021 2 62*    Hemoglobin 02/07/2021 7 8*    Hematocrit 02/07/2021 24 7*    MCV 02/07/2021 94     MCH 02/07/2021 29 8     MCHC 02/07/2021 31 6     RDW 02/07/2021 13 9     MPV 02/07/2021 9 7     Platelets 24/69/3086 228     nRBC 02/07/2021 0     Sodium 02/07/2021 138     Potassium 02/07/2021 4 0     Chloride 02/07/2021 107     CO2 02/07/2021 22     ANION GAP 02/07/2021 9     BUN 02/07/2021 30*    Creatinine 02/07/2021 1 28     Glucose 02/07/2021 113     Calcium 02/07/2021 7 8*    Corrected Calcium 02/07/2021 9 5     AST 02/07/2021 99*    ALT 02/07/2021 108*    Alkaline Phosphatase 02/07/2021 193*    Total Protein 02/07/2021 6 0*    Albumin 02/07/2021 1 9*    Total Bilirubin 02/07/2021 0 30     eGFR 02/07/2021 41     ABO Grouping 02/07/2021 B     Rh Factor 02/07/2021 Positive     Antibody Screen 02/07/2021 Negative     Specimen Expiration Date 02/07/2021 [id number]     Unit Product Code 02/07/2021 [id number]     Unit Number 02/07/2021 [id number]     Unit ABO 02/07/2021 B     Unit RH 02/07/2021 POS     Crossmatch 02/07/2021 Compatible     Unit Dispense Status 02/07/2021 Crossmatched     Unit Product Code 02/07/2021 [id number]     Unit Number 02/07/2021 [id number]     Unit ABO 02/07/2021 B     Unit RH 02/07/2021 POS     Crossmatch 02/07/2021 Compatible     Unit Dispense Status 02/07/2021 Issued        Imaging Studies: I have personally reviewed pertinent imaging studies  ASSESSMENT & PLAN:  Lower GI bleed likely secondary to diverticular disease  Acute blood loss anemia  -Colonoscopy from 6/2020 showed pan-diverticular disease   -Patients HGB this am was 7 8; she is getting 2 units PRBC's now   -Will plan bowel prep today for colonoscopy tomorrow    -Will continue to monitor CBC and transfuse as necessary   -Will allow patient a clear liquid diet today  -Will continue to monitor stool frequency   -Continue to hold aspirin therapy  Patient will be seen and examined by Lauren Gallo PA-C  2/7/2021,9:36 AM

## 2021-02-08 ENCOUNTER — ANESTHESIA EVENT (INPATIENT)
Dept: GASTROENTEROLOGY | Facility: HOSPITAL | Age: 76
DRG: 856 | End: 2021-02-08
Payer: MEDICARE

## 2021-02-08 ENCOUNTER — APPOINTMENT (INPATIENT)
Dept: GASTROENTEROLOGY | Facility: HOSPITAL | Age: 76
DRG: 856 | End: 2021-02-08
Payer: MEDICARE

## 2021-02-08 ENCOUNTER — ANESTHESIA (INPATIENT)
Dept: GASTROENTEROLOGY | Facility: HOSPITAL | Age: 76
DRG: 856 | End: 2021-02-08
Payer: MEDICARE

## 2021-02-08 VITALS — HEART RATE: 108 BPM

## 2021-02-08 PROBLEM — R74.01 TRANSAMINITIS: Status: ACTIVE | Noted: 2021-02-08

## 2021-02-08 LAB
ABO GROUP BLD BPU: NORMAL
ALBUMIN SERPL BCP-MCNC: 2.2 G/DL (ref 3.5–5)
ALP SERPL-CCNC: 304 U/L (ref 46–116)
ALT SERPL W P-5'-P-CCNC: 110 U/L (ref 12–78)
ANION GAP SERPL CALCULATED.3IONS-SCNC: 9 MMOL/L (ref 4–13)
AST SERPL W P-5'-P-CCNC: 91 U/L (ref 5–45)
ATRIAL RATE: 93 BPM
BASOPHILS # BLD AUTO: 0.03 THOUSANDS/ΜL (ref 0–0.1)
BASOPHILS NFR BLD AUTO: 0 % (ref 0–1)
BILIRUB SERPL-MCNC: 0.8 MG/DL (ref 0.2–1)
BPU ID: NORMAL
BUN SERPL-MCNC: 18 MG/DL (ref 5–25)
CALCIUM ALBUM COR SERPL-MCNC: 9.6 MG/DL (ref 8.3–10.1)
CALCIUM SERPL-MCNC: 8.2 MG/DL (ref 8.3–10.1)
CHLORIDE SERPL-SCNC: 108 MMOL/L (ref 100–108)
CO2 SERPL-SCNC: 22 MMOL/L (ref 21–32)
CREAT SERPL-MCNC: 1.16 MG/DL (ref 0.6–1.3)
CROSSMATCH: NORMAL
EOSINOPHIL # BLD AUTO: 0.11 THOUSAND/ΜL (ref 0–0.61)
EOSINOPHIL NFR BLD AUTO: 1 % (ref 0–6)
ERYTHROCYTE [DISTWIDTH] IN BLOOD BY AUTOMATED COUNT: 14.6 % (ref 11.6–15.1)
ERYTHROCYTE [DISTWIDTH] IN BLOOD BY AUTOMATED COUNT: 14.6 % (ref 11.6–15.1)
GFR SERPL CREATININE-BSD FRML MDRD: 46 ML/MIN/1.73SQ M
GLUCOSE SERPL-MCNC: 104 MG/DL (ref 65–140)
HCT VFR BLD AUTO: 26.4 % (ref 34.8–46.1)
HCT VFR BLD AUTO: 28.1 % (ref 34.8–46.1)
HCT VFR BLD AUTO: 32.9 % (ref 34.8–46.1)
HGB BLD-MCNC: 10.9 G/DL (ref 11.5–15.4)
HGB BLD-MCNC: 8.7 G/DL (ref 11.5–15.4)
HGB BLD-MCNC: 9.4 G/DL (ref 11.5–15.4)
IMM GRANULOCYTES # BLD AUTO: 0.03 THOUSAND/UL (ref 0–0.2)
IMM GRANULOCYTES NFR BLD AUTO: 0 % (ref 0–2)
LYMPHOCYTES # BLD AUTO: 1.67 THOUSANDS/ΜL (ref 0.6–4.47)
LYMPHOCYTES NFR BLD AUTO: 21 % (ref 14–44)
MCH RBC QN AUTO: 29.9 PG (ref 26.8–34.3)
MCH RBC QN AUTO: 30.1 PG (ref 26.8–34.3)
MCHC RBC AUTO-ENTMCNC: 33 G/DL (ref 31.4–37.4)
MCHC RBC AUTO-ENTMCNC: 33.1 G/DL (ref 31.4–37.4)
MCV RBC AUTO: 90 FL (ref 82–98)
MCV RBC AUTO: 91 FL (ref 82–98)
MONOCYTES # BLD AUTO: 0.38 THOUSAND/ΜL (ref 0.17–1.22)
MONOCYTES NFR BLD AUTO: 5 % (ref 4–12)
NEUTROPHILS # BLD AUTO: 5.6 THOUSANDS/ΜL (ref 1.85–7.62)
NEUTS SEG NFR BLD AUTO: 73 % (ref 43–75)
NRBC BLD AUTO-RTO: 0 /100 WBCS
P AXIS: 73 DEGREES
PLATELET # BLD AUTO: 170 THOUSANDS/UL (ref 149–390)
PLATELET # BLD AUTO: 191 THOUSANDS/UL (ref 149–390)
PMV BLD AUTO: 9.7 FL (ref 8.9–12.7)
PMV BLD AUTO: 9.9 FL (ref 8.9–12.7)
POTASSIUM SERPL-SCNC: 3.4 MMOL/L (ref 3.5–5.3)
PR INTERVAL: 128 MS
PROT SERPL-MCNC: 6.6 G/DL (ref 6.4–8.2)
QRS AXIS: 92 DEGREES
QRSD INTERVAL: 114 MS
QT INTERVAL: 356 MS
QTC INTERVAL: 442 MS
RBC # BLD AUTO: 2.89 MILLION/UL (ref 3.81–5.12)
RBC # BLD AUTO: 3.65 MILLION/UL (ref 3.81–5.12)
SODIUM SERPL-SCNC: 139 MMOL/L (ref 136–145)
T WAVE AXIS: 50 DEGREES
UNIT DISPENSE STATUS: NORMAL
UNIT PRODUCT CODE: NORMAL
UNIT RH: NORMAL
VENTRICULAR RATE: 93 BPM
WBC # BLD AUTO: 6.83 THOUSAND/UL (ref 4.31–10.16)
WBC # BLD AUTO: 7.82 THOUSAND/UL (ref 4.31–10.16)

## 2021-02-08 PROCEDURE — 85025 COMPLETE CBC W/AUTO DIFF WBC: CPT | Performed by: INTERNAL MEDICINE

## 2021-02-08 PROCEDURE — 85027 COMPLETE CBC AUTOMATED: CPT | Performed by: INTERNAL MEDICINE

## 2021-02-08 PROCEDURE — 99233 SBSQ HOSP IP/OBS HIGH 50: CPT | Performed by: INTERNAL MEDICINE

## 2021-02-08 PROCEDURE — C9113 INJ PANTOPRAZOLE SODIUM, VIA: HCPCS | Performed by: NURSE PRACTITIONER

## 2021-02-08 PROCEDURE — 85018 HEMOGLOBIN: CPT | Performed by: INTERNAL MEDICINE

## 2021-02-08 PROCEDURE — 99024 POSTOP FOLLOW-UP VISIT: CPT | Performed by: NURSE PRACTITIONER

## 2021-02-08 PROCEDURE — 0DJD8ZZ INSPECTION OF LOWER INTESTINAL TRACT, VIA NATURAL OR ARTIFICIAL OPENING ENDOSCOPIC: ICD-10-PCS | Performed by: INTERNAL MEDICINE

## 2021-02-08 PROCEDURE — 85014 HEMATOCRIT: CPT | Performed by: INTERNAL MEDICINE

## 2021-02-08 PROCEDURE — 80053 COMPREHEN METABOLIC PANEL: CPT | Performed by: INTERNAL MEDICINE

## 2021-02-08 PROCEDURE — 93010 ELECTROCARDIOGRAM REPORT: CPT | Performed by: INTERNAL MEDICINE

## 2021-02-08 PROCEDURE — 45378 DIAGNOSTIC COLONOSCOPY: CPT | Performed by: INTERNAL MEDICINE

## 2021-02-08 RX ORDER — POTASSIUM CHLORIDE 20 MEQ/1
20 TABLET, EXTENDED RELEASE ORAL ONCE
Status: COMPLETED | OUTPATIENT
Start: 2021-02-08 | End: 2021-02-08

## 2021-02-08 RX ORDER — HYDRALAZINE HYDROCHLORIDE 20 MG/ML
5 INJECTION INTRAMUSCULAR; INTRAVENOUS EVERY 6 HOURS PRN
Status: DISCONTINUED | OUTPATIENT
Start: 2021-02-08 | End: 2021-02-10 | Stop reason: HOSPADM

## 2021-02-08 RX ORDER — SODIUM CHLORIDE, SODIUM LACTATE, POTASSIUM CHLORIDE, CALCIUM CHLORIDE 600; 310; 30; 20 MG/100ML; MG/100ML; MG/100ML; MG/100ML
INJECTION, SOLUTION INTRAVENOUS CONTINUOUS PRN
Status: DISCONTINUED | OUTPATIENT
Start: 2021-02-08 | End: 2021-02-08

## 2021-02-08 RX ORDER — MAGNESIUM CARB/ALUMINUM HYDROX 105-160MG
296 TABLET,CHEWABLE ORAL ONCE
Status: COMPLETED | OUTPATIENT
Start: 2021-02-08 | End: 2021-02-08

## 2021-02-08 RX ORDER — EPHEDRINE SULFATE 50 MG/ML
INJECTION INTRAVENOUS AS NEEDED
Status: DISCONTINUED | OUTPATIENT
Start: 2021-02-08 | End: 2021-02-08

## 2021-02-08 RX ORDER — PROPOFOL 10 MG/ML
INJECTION, EMULSION INTRAVENOUS AS NEEDED
Status: DISCONTINUED | OUTPATIENT
Start: 2021-02-08 | End: 2021-02-08

## 2021-02-08 RX ORDER — GLYCOPYRROLATE 0.2 MG/ML
INJECTION INTRAMUSCULAR; INTRAVENOUS AS NEEDED
Status: DISCONTINUED | OUTPATIENT
Start: 2021-02-08 | End: 2021-02-08

## 2021-02-08 RX ORDER — OLMESARTAN MEDOXOMIL 40 MG/1
40 TABLET ORAL DAILY
Status: DISCONTINUED | OUTPATIENT
Start: 2021-02-08 | End: 2021-02-10 | Stop reason: HOSPADM

## 2021-02-08 RX ORDER — SODIUM CHLORIDE 9 MG/ML
75 INJECTION, SOLUTION INTRAVENOUS CONTINUOUS
Status: DISCONTINUED | OUTPATIENT
Start: 2021-02-08 | End: 2021-02-09

## 2021-02-08 RX ADMIN — PANTOPRAZOLE SODIUM 40 MG: 40 INJECTION, POWDER, FOR SOLUTION INTRAVENOUS at 09:14

## 2021-02-08 RX ADMIN — PILOCARPINE HYDROCHLORIDE 5 MG: 5 TABLET, FILM COATED ORAL at 05:34

## 2021-02-08 RX ADMIN — SALINE NASAL SPRAY 1 SPRAY: 1.5 SOLUTION NASAL at 21:18

## 2021-02-08 RX ADMIN — OLMESARTAN MEDOXOMIL 40 MG: 40 TABLET, FILM COATED ORAL at 10:07

## 2021-02-08 RX ADMIN — SODIUM CHLORIDE 75 ML/HR: 0.9 INJECTION, SOLUTION INTRAVENOUS at 10:08

## 2021-02-08 RX ADMIN — PROPOFOL 10 MG: 10 INJECTION, EMULSION INTRAVENOUS at 16:21

## 2021-02-08 RX ADMIN — MAGNESIUM CITRATE 296 ML: 1.75 LIQUID ORAL at 10:08

## 2021-02-08 RX ADMIN — PROPOFOL 10 MG: 10 INJECTION, EMULSION INTRAVENOUS at 16:40

## 2021-02-08 RX ADMIN — PILOCARPINE HYDROCHLORIDE 5 MG: 5 TABLET, FILM COATED ORAL at 17:52

## 2021-02-08 RX ADMIN — CEFEPIME HYDROCHLORIDE 1000 MG: 2 INJECTION, POWDER, FOR SOLUTION INTRAVENOUS at 21:18

## 2021-02-08 RX ADMIN — PROPOFOL 60 MG: 10 INJECTION, EMULSION INTRAVENOUS at 16:14

## 2021-02-08 RX ADMIN — POTASSIUM CHLORIDE 20 MEQ: 1500 TABLET, EXTENDED RELEASE ORAL at 09:16

## 2021-02-08 RX ADMIN — PROPOFOL 10 MG: 10 INJECTION, EMULSION INTRAVENOUS at 16:29

## 2021-02-08 RX ADMIN — PILOCARPINE HYDROCHLORIDE 5 MG: 5 TABLET, FILM COATED ORAL at 09:15

## 2021-02-08 RX ADMIN — CEFEPIME HYDROCHLORIDE 1000 MG: 2 INJECTION, POWDER, FOR SOLUTION INTRAVENOUS at 09:12

## 2021-02-08 RX ADMIN — SODIUM CHLORIDE, SODIUM LACTATE, POTASSIUM CHLORIDE, AND CALCIUM CHLORIDE: .6; .31; .03; .02 INJECTION, SOLUTION INTRAVENOUS at 16:03

## 2021-02-08 RX ADMIN — GLYCOPYRROLATE 0.1 MG: 0.2 INJECTION, SOLUTION INTRAMUSCULAR; INTRAVENOUS at 16:23

## 2021-02-08 RX ADMIN — SODIUM CHLORIDE 125 ML/HR: 0.9 INJECTION, SOLUTION INTRAVENOUS at 09:11

## 2021-02-08 RX ADMIN — HYDRALAZINE HYDROCHLORIDE 5 MG: 20 INJECTION, SOLUTION INTRAMUSCULAR; INTRAVENOUS at 01:00

## 2021-02-08 RX ADMIN — PROPOFOL 10 MG: 10 INJECTION, EMULSION INTRAVENOUS at 16:34

## 2021-02-08 RX ADMIN — PANTOPRAZOLE SODIUM 40 MG: 40 INJECTION, POWDER, FOR SOLUTION INTRAVENOUS at 21:19

## 2021-02-08 RX ADMIN — EPHEDRINE SULFATE 10 MG: 50 INJECTION INTRAVENOUS at 16:25

## 2021-02-08 RX ADMIN — PILOCARPINE HYDROCHLORIDE 5 MG: 5 TABLET, FILM COATED ORAL at 21:20

## 2021-02-08 RX ADMIN — PAROXETINE HYDROCHLORIDE 10 MG: 20 TABLET, FILM COATED ORAL at 09:14

## 2021-02-08 RX ADMIN — PROPOFOL 30 MG: 10 INJECTION, EMULSION INTRAVENOUS at 16:17

## 2021-02-08 NOTE — ANESTHESIA PREPROCEDURE EVALUATION
Procedure:  COLONOSCOPY    Relevant Problems   CARDIO   (+) Abdominal aortic aneurysm (AAA) 3 0 cm to 5 0 cm in diameter in female [facility])   (+) Abdominal aortic aneurysm (AAA) without rupture (HCC)   (+) Benign hypertension   (+) Hyperlipidemia      GI/HEPATIC   (+) Acute GI bleeding   (+) Hiatal hernia   (+) Rectal bleeding      /RENAL   (+) Acute kidney injury (HCC)   (+) Nephrolithiasis      HEMATOLOGY   (+) Anemia      NEURO/PSYCH   (+) Depression        Physical Exam    Airway    Mallampati score: III  TM Distance: >3 FB  Neck ROM: full     Dental       Cardiovascular  Cardiovascular exam normal    Pulmonary  Pulmonary exam normal     Other Findings      35-year-old female most recently underwent cystoscopy for nephrolithiasis and a right ureteral stent placement then subsequently developed hematochezia  She has had 4 large episode of bright red blood per rectum she is not on any blood thinners other than low-dose aspirin  Her last colonoscopy was in June of 2000 and 20  No polyps were seen but there was pan diverticular disease  Her hemoglobin level is 7 8        Anesthesia Plan  ASA Score- 3     Anesthesia Type- IV sedation with anesthesia with ASA Monitors  Additional Monitors:   Airway Plan:           Plan Factors-    Chart reviewed  EKG reviewed  Imaging results reviewed  Existing labs reviewed  Patient summary reviewed  Induction- intravenous  Postoperative Plan-     Informed Consent- Anesthetic plan and risks discussed with patient  I personally reviewed this patient with the CRNA  Discussed and agreed on the Anesthesia Plan with the CRNA  Shelbi Duran

## 2021-02-08 NOTE — PROGRESS NOTES
Progress Note - Marisol Martin 1945, 76 y o  female MRN: [id number]    Unit/Bed#: [facility] 06-02 Encounter: [id number]    Primary Care Provider: Marilee Morin PA-C   Date and time admitted to hospital: 2/5/2021  1:10 PM        Acute kidney injury [facility])  Assessment & Plan  Baseline creatinine approximately 1  Presented creatinine 2 3  Improving with IV hydration  Creatinine 1 16 today  Will restart Benicar  Now complicated with GI bleeding  Continue IV hydration for now  UTI (urinary tract infection)  Assessment & Plan  Presented with hypotension, chills, rigors, leukocytosis of 18 K  Urinalysis showed bacteria, nitrates, leukocytes  Received IV dose of Zosyn emergency department  Was switched over to cefepime and continue  Leukocytosis improving  Urology evaluation appreciated  Urine culture growing Candida  However with recent instrumentation, will continue IV antibiotics  Patient still having low-grade fever  Candida has low colony count  Will not need treatment  Discussed with urology    * Acute GI bleeding  Assessment & Plan  Yesterday morning patient started having bright red blood per rectum  Multiple episode  Hemoglobin dropped to 7 8 from 10 2 on admission  Discussed with GI and ICU team   Received 2 unit PRBC transfusion  Bleeding stopped  Hemoglobin 10 9 today  Hemodynamically stable now  Plan for colonoscopy today  Bleeding likely from diverticulosis  Follow colonoscopy results    Transaminitis  Assessment & Plan  Improving  CT abdomen did not show any gallbladder or CBD pathology  Statin on hold    Sepsis [facility])  Assessment & Plan  Evident by WBC count of 18 K, tachycardia, hypotension with source being UTI on admission  Blood cultures no growth  Continue IV antibiotics for UTI    Sepsis following  Cystoscopy and right ureteral stent placement on 12/4/21,requiring treatment with IV Zosyn and Cefepime        Hyperlipidemia  Assessment & Plan  Hold statin therapy in the setting of transaminitis  Improving    Benign hypertension  Assessment & Plan  BP running high  Will restart home medication Benicar  Hydralazine p r n  Nephrolithiasis  Assessment & Plan  Patient underwent cystoscopy  with right ureteral stent placement before admission  Urology on board        VTE Pharmacologic Prophylaxis:   Pharmacologic: Pharmacologic VTE Prophylaxis contraindicated due to GI bleed  Mechanical VTE Prophylaxis in Place: Yes    Patient Centered Rounds: I have performed bedside rounds with nursing staff today  Discussions with Specialists or Other Care Team Provider:  GI, Urology    Education and Discussions with Family / Patient:  Patient, called daughter    Time Spent for Care: More than 50% of total time spent on counseling and coordination of care as described above  Current Length of Stay: 3 day(s)    Current Patient Status: Inpatient   Certification Statement: The patient will continue to require additional inpatient hospital stay due to See above    Discharge Plan:  24-48 hours    Code Status: Level 1 - Full Code      Subjective:   I have seen and examined the patient bedside this morning  Patient feels much better  No further active bleeding  No abdominal pain  Still having low-grade fever  No chest pain or shortness of breath  Less anxious today  Objective:     Vitals:   Temp (24hrs), Av °F (37 2 °C), Min:98 1 °F (36 7 °C), Max:100 4 °F (38 °C)    Temp:  [98 1 °F (36 7 °C)-100 4 °F (38 °C)] 99 4 °F (37 4 °C)  HR:  [] 99  Resp:  [15-22] 20  BP: (166-192)/() 192/99  SpO2:  [94 %-97 %] 96 %  Body mass index is 28 51 kg/m²  Input and Output Summary (last 24 hours):        Intake/Output Summary (Last 24 hours) at 2021 1513  Last data filed at 2021 1401  Gross per 24 hour   Intake 3553 75 ml   Output 451 ml   Net 3102 75 ml       Physical Exam:     Physical Exam  General- Awake, alert and oriented x 3, looks comfortable  HEENT- Normocephalic, atraumatic  Neck- Supple  CVS- Normal S1/ S2, Regular rate and rhythm  Respiratory system- B/L clear breath sounds, no wheezing  Abdomen- Soft, Non distended, no tenderness, Bowel sound- present  Genitourinary- No suprapubic tenderness, No CVA tenderness  CNS- No acute focal neurologic deficit noted    Additional Data:     Labs:    Results from last 7 days   Lab Units 02/08/21  0506  02/06/21  0557   WBC Thousand/uL 7 82   < > 11 35*   HEMOGLOBIN g/dL 10 9*   < > 9 2*   HEMATOCRIT % 32 9*   < > 29 4*   PLATELETS Thousands/uL 191   < > 226   BANDS PCT %  --   --  13*   NEUTROS PCT % 73  --   --    LYMPHS PCT % 21  --   --    LYMPHO PCT %  --   --  6*   MONOS PCT % 5  --   --    MONO PCT %  --   --  1*   EOS PCT % 1  --  0    < > = values in this interval not displayed  Results from last 7 days   Lab Units 02/08/21  0506   SODIUM mmol/L 139   POTASSIUM mmol/L 3 4*   CHLORIDE mmol/L 108   CO2 mmol/L 22   BUN mg/dL 18   CREATININE mg/dL 1 16   ANION GAP mmol/L 9   CALCIUM mg/dL 8 2*   ALBUMIN g/dL 2 2*   TOTAL BILIRUBIN mg/dL 0 80   ALK PHOS U/L 304*   ALT U/L 110*   AST U/L 91*   GLUCOSE RANDOM mg/dL 104     Results from last 7 days   Lab Units 02/05/21  1325   INR  1 18             Results from last 7 days   Lab Units 02/06/21  0557 02/05/21  1325   LACTIC ACID mmol/L  --  2 0   PROCALCITONIN ng/ml 2 40* 3 53*           * I Have Reviewed All Lab Data Listed Above  * Additional Pertinent Lab Tests Reviewed: All Labs Within Last 24 Hours Reviewed    Imaging:    Imaging Reports Reviewed Today Include:   Imaging Personally Reviewed by Myself Includes:      Recent Cultures (last 7 days):     Results from last 7 days   Lab Units 02/05/21  1441 02/05/21  1430 02/05/21  1325   BLOOD CULTURE   --  No Growth at 48 hrs  No Growth at 48 hrs     URINE CULTURE  20,000-29,000 cfu/ml Candida albicans*  --   --        Last 24 Hours Medication List:   Current Facility-Administered Medications   Medication Dose Route Frequency Provider Last Rate    acetaminophen  650 mg Oral Q6H PRN Jose Rafael Vargas MD      ALPRAZolam  0 25 mg Oral TID PRN Margaret Angulo MD      cefepime  1,000 mg Intravenous Q12H Jose Rafael Vargas MD 1,000 mg (02/08/21 0912)    hydrALAZINE  5 mg Intravenous Q6H PRN SILVER Stern      olmesartan  40 mg Oral Daily Margaret Angulo MD      ondansetron  4 mg Intravenous Q6H PRN Jose Rafael Vagras MD      pantoprazole  40 mg Intravenous Q12H [address] SILVER Stern      PARoxetine  10 mg Oral Daily Jose Rafael Vargas MD      pilocarpine  5 mg Oral Q4H Margaret Angulo MD      sodium chloride  1 spray Each Nare Q2H PRN SILVER Stern      sodium chloride  75 mL/hr Intravenous Continuous Pat Dial PA-C 75 mL/hr (02/08/21 1008)        Today, Patient Was Seen By: Reena Hu MD    ** Please Note: Dictation voice to text software may have been used in the creation of this document   **

## 2021-02-08 NOTE — ANESTHESIA POSTPROCEDURE EVALUATION
Post-Op Assessment Note    CV Status:  Stable  Pain Score: 0    Pain management: adequate     Mental Status:  Alert and awake   Hydration Status:  Stable   PONV Controlled:  None   Airway Patency:  Patent and adequate      Post Op Vitals Reviewed: Yes      Staff: Anesthesiologist, CRNA         No complications documented      /57   Temp      Pulse  110   Resp   16   SpO2 97%

## 2021-02-08 NOTE — ASSESSMENT & PLAN NOTE
Evident by WBC count of 18 K, tachycardia, hypotension with source being UTI on admission  Blood cultures no growth  Continue IV antibiotics for UTI    Sepsis following  Cystoscopy and right ureteral stent placement on 12/4/21,requiring treatment with IV Zosyn and Cefepime

## 2021-02-08 NOTE — ASSESSMENT & PLAN NOTE
Presented with hypotension, chills, rigors, leukocytosis of 18 K  Urinalysis showed bacteria, nitrates, leukocytes  Received IV dose of Zosyn emergency department  Was switched over to cefepime and continue  Leukocytosis improving  Urology evaluation appreciated  Urine culture growing Candida  However with recent instrumentation, will continue IV antibiotics  Patient still having low-grade fever  Candida has low colony count  Will not need treatment    Discussed with urology

## 2021-02-08 NOTE — PLAN OF CARE
Problem: Potential for Falls  Goal: Patient will remain free of falls  Description: INTERVENTIONS:  - Assess patient frequently for physical needs  -  Identify cognitive and physical deficits and behaviors that affect risk of falls    -  [place] fall precautions as indicated by assessment   - Educate patient/family on patient safety including physical limitations  - Instruct patient to call for assistance with activity based on assessment  - Modify environment to reduce risk of injury  - Consider OT/PT consult to assist with strengthening/mobility  Outcome: Progressing     Problem: PAIN - ADULT  Goal: Verbalizes/displays adequate comfort level or baseline comfort level  Description: Interventions:  - Encourage patient to monitor pain and request assistance  - Assess pain using appropriate pain scale  - Administer analgesics based on type and severity of pain and evaluate response  - Implement non-pharmacological measures as appropriate and evaluate response  - Consider cultural and social influences on pain and pain management  - Notify physician/advanced practitioner if interventions unsuccessful or patient reports new pain  Outcome: Progressing     Problem: INFECTION - ADULT  Goal: Absence or prevention of progression during hospitalization  Description: INTERVENTIONS:  - Assess and monitor for signs and symptoms of infection  - Monitor lab/diagnostic results  - Monitor all insertion sites, i e  indwelling lines, tubes, and drains  - Monitor endotracheal if appropriate and nasal secretions for changes in amount and color  - Morning Sun appropriate cooling/warming therapies per order  - Administer medications as ordered  - Instruct and encourage patient and family to use good hand hygiene technique  - Identify and instruct in appropriate isolation precautions for identified infection/condition  Outcome: Progressing  Goal: Absence of fever/infection during neutropenic period  Description: INTERVENTIONS:  - Monitor WBC    Outcome: Progressing     Problem: SAFETY ADULT  Goal: Patient will remain free of falls  Description: INTERVENTIONS:  - Assess patient frequently for physical needs  -  Identify cognitive and physical deficits and behaviors that affect risk of falls    -  [place] fall precautions as indicated by assessment   - Educate patient/family on patient safety including physical limitations  - Instruct patient to call for assistance with activity based on assessment  - Modify environment to reduce risk of injury  - Consider OT/PT consult to assist with strengthening/mobility  Outcome: Progressing  Goal: Maintain or return to baseline ADL function  Description: INTERVENTIONS:  -  Assess patient's ability to carry out ADLs; assess patient's baseline for ADL function and identify physical deficits which impact ability to perform ADLs (bathing, care of mouth/teeth, toileting, grooming, dressing, etc )  - Assess/evaluate cause of self-care deficits   - Assess range of motion  - Assess patient's mobility; develop plan if impaired  - Assess patient's need for assistive devices and provide as appropriate  - Encourage maximum independence but intervene and supervise when necessary  - Involve family in performance of ADLs  - Assess for home care needs following discharge   - Consider OT consult to assist with ADL evaluation and planning for discharge  - Provide patient education as appropriate  Outcome: Progressing  Goal: Maintain or return mobility status to optimal level  Description: INTERVENTIONS:  - Assess patient's baseline mobility status (ambulation, transfers, stairs, etc )    - Identify cognitive and physical deficits and behaviors that affect mobility  - Identify mobility aids required to assist with transfers and/or ambulation (gait belt, sit-to-stand, lift, walker, cane, etc )  - [place] fall precautions as indicated by assessment  - Record patient progress and toleration of activity level on Mobility SBAR; progress patient to next Phase/Stage  - Instruct patient to call for assistance with activity based on assessment  - Consider rehabilitation consult to assist with strengthening/weightbearing, etc   Outcome: Progressing     Problem: DISCHARGE PLANNING  Goal: Discharge to home or other facility with appropriate resources  Description: INTERVENTIONS:  - Identify barriers to discharge w/patient and caregiver  - Arrange for needed discharge resources and transportation as appropriate  - Identify discharge learning needs (meds, wound care, etc )  - Arrange for interpretive services to assist at discharge as needed  - Refer to Case Management Department for coordinating discharge planning if the patient needs post-hospital services based on physician/advanced practitioner order or complex needs related to functional status, cognitive ability, or social support system  Outcome: Progressing     Problem: Knowledge Deficit  Goal: Patient/family/caregiver demonstrates understanding of disease process, treatment plan, medications, and discharge instructions  Description: Complete learning assessment and assess knowledge base  Interventions:  - Provide teaching at level of understanding  - Provide teaching via preferred learning methods  Outcome: Progressing     Problem: Nutrition/Hydration-ADULT  Goal: Nutrient/Hydration intake appropriate for improving, restoring or maintaining nutritional needs  Description: Monitor and assess patient's nutrition/hydration status for malnutrition  Collaborate with interdisciplinary team and initiate plan and interventions as ordered  Monitor patient's weight and dietary intake as ordered or per policy  Utilize nutrition screening tool and intervene as necessary  Determine patient's food preferences and provide high-protein, high-caloric foods as appropriate       INTERVENTIONS:  - Monitor oral intake, urinary output, labs, and treatment plans  - Assess nutrition and hydration status and recommend course of action  - Evaluate amount of meals eaten  - Assist patient with eating if necessary   - Allow adequate time for meals  - Recommend/ encourage appropriate diets, oral nutritional supplements, and vitamin/mineral supplements  - Order, calculate, and assess calorie counts as needed  - Recommend, monitor, and adjust tube feedings and TPN/PPN based on assessed needs  - Assess need for intravenous fluids  - Provide specific nutrition/hydration education as appropriate  - Include patient/family/caregiver in decisions related to nutrition  Outcome: Progressing

## 2021-02-08 NOTE — ASSESSMENT & PLAN NOTE
Yesterday morning patient started having bright red blood per rectum  Multiple episode  Hemoglobin dropped to 7 8 from 10 2 on admission  Discussed with GI and ICU team   Received 2 unit PRBC transfusion  Bleeding stopped  Hemoglobin 10 9 today  Hemodynamically stable now  Plan for colonoscopy today  Bleeding likely from diverticulosis    Follow colonoscopy results

## 2021-02-08 NOTE — ASSESSMENT & PLAN NOTE
Patient underwent cystoscopy  with right ureteral stent placement before admission  Urology on board

## 2021-02-08 NOTE — CASE MANAGEMENT
LOS 3 DAYS  PATIENT IS NOT A READMISSION: WITH A UNPLANNED READMISSION SCORE OF 10  PATIENT IS NOT A BUNDLE  CM met with patient at beside to complete CM open and discuss discharge planning  Patient lives in [place] with her   In a 2 SH with 8 LEATHA  She has a first floor set up  Patient has no DME and is independent in all aspects  She has used [facility] for OP for therapy  She has never used any home health agency  She uses [facility]  Her PCP is Linda Naidu  Patient denies any Mental health dx history  She denied substance abuse  History  Her  is her HCR and she is employed is vacation [employer]  Consult for OP resources noted  She denied any OP needs  Her daughters are here from [place] to help  She drives and states that family will transport home  CM reviewed discharge planning process including the following: identifying caregivers at home, preference for d/c planning needs, availability of treatment team to discuss questions or concerns patient and/or family may have regarding diagnosis, plan of care, old or new medications and discharge planning   CM will continue to follow for care coordination and update assessment as appropriate  Patient is a 75 yo female admitted with dx of GIB  Consult GI, COL TODAY nephrolithiasis status post cystoscopy with right ureteral stent placement on 2/5,  She is independent in all aspects, drives and employed [address]   PATIENT HAS RIDE HOME

## 2021-02-08 NOTE — ASSESSMENT & PLAN NOTE
Baseline creatinine approximately 1  Presented creatinine 2 3  Improving with IV hydration  Creatinine 1 16 today  Will restart Benicar  Now complicated with GI bleeding  Continue IV hydration for now

## 2021-02-08 NOTE — CONSULTS
UROLOGY CONSULTATION NOTE     Patient Identifiers: Sylvie Walker (MRN [id number])  Service Requesting Consultation: Marizol Gilbert MD    Service Providing Consultation:  Urology, Diamond Watkins    Date of Service: 2/8/2021  Inpatient consult to Urology  Consult performed by: SILVER Watkins  Consult ordered by: Marizol Gilbert MD          Reason for Consultation: UTI    ASSESSMENT:     76 y o  old female with  history of nephrolithiasis, significant right stone burden status post PCNL and postoperative day for right ureteroscopy, laser lithotripsy and insertion of right ureteral stent, and UTI  LINDSEY now resolved    PLAN:   Continue medical optimization and antibiosis  Appreciate inter disciplinary management and workup for GI bleed which is ongoing  CT reveals favorable ureteral stent position  Requirement for  surgical intervention at this time  Patient will follow-up as scheduled with Dr Jayshree Chavarria for cystoscopy and stent removal in approximately 2 weeks  History of Present Illness:     Sylvie Walker is a 76 y o  old female longstanding history of nephrolithiasis and significant stone burden status post right PCNL December 2020 by Dr Theo Luevano  Patient underwent follow-up ureteroscopy 2/4 for residual right ureteral stone fragments  Per operative report, antibiotic and DVT prophylaxis administered preoperatively  Patient presented to [facility] Emergency room with chills, low-grade fever, hypotension  Urinalysis contain nitrites and leukocytes  Patient received Zosyn in the emergency room  Lab work revealed acute kidney injury with presenting creatinine of 2 3 from baseline of 1  She was admitted to the Internal Medicine service for meeting sepsis parameters  Urine culture thus far is only demonstrated low levels of Candida  Blood culture x2 negative for pathogen growth in 48 hours    CT of the abdomen and pelvis demonstrate favorable position recently inserted right ureteral stent that improvement of prior hydronephrosis  Right lower pole nonobstructing renal calculi and punctate right ureteral stones adjacent to right ureteral stent  Overall, stone burden diminished  Patient is seen in her room, afebrile, hemodynamically stable with new complaints of GI bleed  She is NPO and awaiting endoscopic GI evaluation  Urologic consultation requested for further management recommendations  Past Medical, Past Surgical History:     Past Medical History:   Diagnosis Date    Abdominal aortic aneurysm (AAA) ([facility] [address] )     Hyperlipidemia     Hypertension     Kidney stone     Kidney stones     Pneumonia     Sjogren's syndrome ([facility] [address] )    :    Past Surgical History:   Procedure Laterality Date    CATARACT EXTRACTION      DILATION AND CURETTAGE OF UTERUS      FL RETROGRADE PYELOGRAM  12/7/2020    FL RETROGRADE PYELOGRAM  2/4/2021    IR NEPHROURETERAL ACCESS FOR UROLOGY PCNL  12/1/2020    OH CYSTO/URETERO W/LITHOTRIPSY &INDWELL STENT INSRT Right 2/4/2021    Procedure: CYSTOSCOPY URETEROSCOPY WITH LITHOTRIPSY HOLMIUM LASER, RETROGRADE PYELOGRAM AND INSERTION STENT URETERAL;  Surgeon: Tracy Carvalho MD;  Location: [facility];  Service: Urology    OH CYSTO/URETERO/PYELOSCOPY, DX Right 2/15/2017    Procedure: URETEROSCOPY, STENT PLACEMENT ;  Surgeon: Tracy Carvalho MD;  [facility];  Service: Urology    OH WILFRIDUT Ken CM Right 2/15/2017    Procedure: ACCESS INTERPOLAR CALYX, INSERTED TWO WIRES INTO BLADDER, NEPHROLITHOTOMY  PERCUTANEOUS ;  Surgeon: Tracy Carvalho MD;  [facility];  Service: Urology    OH CARLOS Rogers CM Right 12/7/2020    Procedure: NEPHROLITHOTOMY  PERCUTANEOUS (PCNL);   Surgeon: Tracy Carvalho MD;  [facility];  Service: Urology    SHOULDER SURGERY     :    Medications, Allergies:     Current Facility-Administered Medications   Medication Dose Route Frequency    acetaminophen (TYLENOL) tablet 650 mg  650 mg Oral Q6H PRN    ALPRAZolam (XANAX) tablet 0 25 mg  0 25 mg Oral TID PRN    cefepime (MAXIPIME) 1,000 mg in dextrose 5 % 50 mL IVPB  1,000 mg Intravenous Q12H    hydrALAZINE (APRESOLINE) injection 5 mg  5 mg Intravenous Q6H PRN    ondansetron (ZOFRAN) injection 4 mg  4 mg Intravenous Q6H PRN    pantoprazole (PROTONIX) injection 40 mg  40 mg Intravenous Q12H [address]    PARoxetine (PAXIL) tablet 10 mg  10 mg Oral Daily    pilocarpine (SALAGEN) tablet 5 mg  5 mg Oral Q4H    sodium chloride (OCEAN) 0 65 % nasal spray 1 spray  1 spray Each Nare Q2H PRN    sodium chloride 0 9 % infusion  125 mL/hr Intravenous Continuous       Allergies:   Allergies   Allergen Reactions    Other Other (See Comments)     "Telugu food"- swollen tongue      Zosyn [Piperacillin Sod-Tazobactam So] Hives   :    Social and Family History:   Social History:   Social History     Tobacco Use    Smoking status: Former Smoker     Packs/day: 1 00     Years: 20 00     Pack years: 20 00     Quit date:      Years since quittin 1    Smokeless tobacco: Former User     Quit date:     Tobacco comment: smokes "very rarely"   Substance Use Topics    Alcohol use: Yes     Frequency: Monthly or less     Drinks per session: 1 or 2    Drug use: No        Social History     Tobacco Use   Smoking Status Former Smoker    Packs/day: 1 00    Years: 20 00    Pack years: 20 00    Quit date:     Years since quittin 1   Smokeless Tobacco Former User    Quit date:    Tobacco Comment    smokes "very rarely"       Family History:  Family History   Problem Relation Age of Onset    Cancer Mother     Colon cancer Mother 80        CARCINOMA     Melanoma Mother     Alzheimer's disease Father     Urinary tract infection Sister     Other Sister 39        urinary tract cancer    Cancer Brother         BLADDER    Stroke Maternal Grandfather         CVA    No Known Problems Daughter     No Known Problems Maternal Grandmother     No Known Problems Paternal Grandmother     No Known Problems Sister     No Known Problems Sister     No Known Problems Daughter     No Known Problems Daughter     Lung cancer Maternal Aunt [address]    No Known Problems Paternal Aunt     No Known Problems Paternal Aunt    :     Review of Systems:     Review of Systems - History obtained from chart review and the patient  General ROS: positive for  - chills and fever  Respiratory ROS: no cough, shortness of breath, or wheezing  Cardiovascular ROS: no chest pain or dyspnea on exertion  Gastrointestinal ROS: positive for - blood in stools  negative for - abdominal pain or nausea/vomiting  Genito-Urinary ROS: no dysuria, trouble voiding, or hematuria  Neurological ROS: no TIA or stroke symptoms    All other systems queried were negative  Physical Exam:   General: Patient is pleasant and in NAD  Awake and alert  /83 (BP Location: Right arm)   Pulse 99   Temp 98 1 °F (36 7 °C) (Oral)   Resp 15   Ht 5' 3" (1 6 m)   Wt 73 kg (160 lb 15 oz)   SpO2 96%   BMI 28 51 kg/m² Temp (24hrs), Av 1 °F (37 3 °C), Min:98 1 °F (36 7 °C), Max:100 4 °F (38 °C)  current; Temperature: 98 1 °F (36 7 °C)  I/O last 24 hours: In: 5110 4 [P O :350;  I V :4760 4]  Out: 0     General appearance: alert and oriented, in no acute distress, appears stated age, cooperative and no distress  Head: Normocephalic, without obvious abnormality, atraumatic  Neck: no adenopathy, no carotid bruit, no JVD, supple, symmetrical, trachea midline and thyroid not enlarged, symmetric, no tenderness/mass/nodules  Lungs: diminished breath sounds  Heart: regular rate and rhythm, S1, S2 normal, no murmur, click, rub or gallop  Abdomen: soft, non-tender; bowel sounds normal; no masses,  no organomegaly  Extremities: extremities normal, warm and well-perfused; no cyanosis, clubbing, or edema  Pulses: 2+ and symmetric  Neurologic: Grossly normal  Nio urinary drains    Labs:     Lab Results   Component Value Date    HGB 10 9 (L) 02/08/2021    HCT 32 9 (L) 02/08/2021    WBC 7 82 02/08/2021     02/08/2021   ]    Lab Results   Component Value Date    K 3 4 (L) 02/08/2021     02/08/2021    CO2 22 02/08/2021    BUN 18 02/08/2021    CREATININE 1 16 02/08/2021    CALCIUM 8 2 (L) 02/08/2021   ]    Imaging:   I personally reviewed the images and report of the following studies, and reviewed them with the patient:    CT Abdomen: see below      CT abdomen pelvis with contrast [[id number]] Collected: 02/05/21 1338   Order Status: Completed Updated: 02/05/21 1425   Narrative:     CT ABDOMEN AND PELVIS WITH IV CONTRAST     INDICATION:   Abdominal infection suspected   Hypotension, ureteral stent placement yesterday  COMPARISON:  1/19/2021     TECHNIQUE:  CT examination of the abdomen and pelvis was performed  Axial, sagittal, and coronal 2D reformatted images were created from the source data and submitted for interpretation  Radiation dose length product (DLP) for this visit:  906 mGy-cm    This examination, like all CT scans performed in the [facility], was performed utilizing techniques to minimize radiation dose exposure, including the use of iterative   reconstruction and automated exposure control  IV Contrast:  100 mL of iohexol (OMNIPAQUE)   Enteric Contrast:  Enteric contrast was not administered  FINDINGS:     ABDOMEN     LOWER CHEST:  Moderately large hiatal hernia     LIVER/BILIARY TREE:  Unremarkable  GALLBLADDER:  Mildly distended but otherwise unremarkable     SPLEEN:  Unremarkable  PANCREAS:  Unremarkable  ADRENAL GLANDS:  Unremarkable       KIDNEYS/URETERS:  Interval right ureteral stent exchange   Right collecting system significantly diminished in caliber since prior study   Small amount of air within the upper pole collecting system likely related to the recent instrumentation      Increased urothelial enhancement identified involving the renal pelvis and infundibula, for example image 2/28   Right lower pole calculi present, the largest measures 8 mm image 2/33   Residual minimal 1-2 mm calculus noted adjacent to the stent,   coronal image 601/71  Periureteral edema also present   Distal portion of the catheter is in the right aspect of the urinary bladder and proximally located within the right renal pelvis  On the left, no hydronephrosis   Upper pole cyst again noted  STOMACH AND BOWEL:  Colonic diverticulosis   No bowel obstruction or acute inflammatory change  APPENDIX:  No findings to suggest appendicitis  ABDOMINOPELVIC CAVITY:  No ascites   No pneumoperitoneum   No lymphadenopathy  VESSELS:  Abdominal aortic aneurysm as noted on prior study   No retroperitoneal hemorrhage  PELVIS     REPRODUCTIVE ORGANS:  Left adnexal cyst measuring 2 3 x 1 8 cm, unchanged  URINARY BLADDER:  Contains a small amount of air   Bladder wall enhancement present, and containing the distal portion of the right ureteral stent     ABDOMINAL WALL/INGUINAL REGIONS:  Unremarkable  OSSEOUS STRUCTURES:  No acute fracture or destructive osseous lesion  Impression:       1  Status post replacement of right ureteral stent with significant improvement of previous hydronephrosis  2  Single 1 to 2 mm calculus remaining in the right ureter adjacent to the stent but significantly diminished ureteral calculus burden since prior  3  Right kidney lower pole calculi as above   4  Urothelial enhancement of the renal pelvis, periureteral edema, bladder wall enhancement, suspicious for urinary tract infection  5  Unchanged appearance of left adnexal cyst, hiatal hernia, abdominal aortic aneurysm, and left renal cyst         Workstation performed: [id number]          Thank you for allowing me to participate in this patients care  Please do not hesitate to call with any additional questions    SILVER Muñoz

## 2021-02-09 ENCOUNTER — APPOINTMENT (INPATIENT)
Dept: ULTRASOUND IMAGING | Facility: HOSPITAL | Age: 76
DRG: 856 | End: 2021-02-09
Payer: MEDICARE

## 2021-02-09 LAB
ALBUMIN SERPL BCP-MCNC: 1.9 G/DL (ref 3.5–5)
ALP SERPL-CCNC: 285 U/L (ref 46–116)
ALT SERPL W P-5'-P-CCNC: 148 U/L (ref 12–78)
ANION GAP SERPL CALCULATED.3IONS-SCNC: 9 MMOL/L (ref 4–13)
AST SERPL W P-5'-P-CCNC: 151 U/L (ref 5–45)
BASOPHILS # BLD AUTO: 0.03 THOUSANDS/ΜL (ref 0–0.1)
BASOPHILS NFR BLD AUTO: 0 % (ref 0–1)
BILIRUB DIRECT SERPL-MCNC: 0.24 MG/DL (ref 0–0.2)
BILIRUB SERPL-MCNC: 0.5 MG/DL (ref 0.2–1)
BUN SERPL-MCNC: 16 MG/DL (ref 5–25)
CALCIUM SERPL-MCNC: 7.7 MG/DL (ref 8.3–10.1)
CHLORIDE SERPL-SCNC: 107 MMOL/L (ref 100–108)
CO2 SERPL-SCNC: 22 MMOL/L (ref 21–32)
CREAT SERPL-MCNC: 0.93 MG/DL (ref 0.6–1.3)
EOSINOPHIL # BLD AUTO: 0.14 THOUSAND/ΜL (ref 0–0.61)
EOSINOPHIL NFR BLD AUTO: 2 % (ref 0–6)
ERYTHROCYTE [DISTWIDTH] IN BLOOD BY AUTOMATED COUNT: 14.3 % (ref 11.6–15.1)
GFR SERPL CREATININE-BSD FRML MDRD: 60 ML/MIN/1.73SQ M
GLUCOSE SERPL-MCNC: 96 MG/DL (ref 65–140)
HCT VFR BLD AUTO: 26.3 % (ref 34.8–46.1)
HCT VFR BLD AUTO: 27.1 % (ref 34.8–46.1)
HGB BLD-MCNC: 8.7 G/DL (ref 11.5–15.4)
HGB BLD-MCNC: 8.9 G/DL (ref 11.5–15.4)
IMM GRANULOCYTES # BLD AUTO: 0.07 THOUSAND/UL (ref 0–0.2)
IMM GRANULOCYTES NFR BLD AUTO: 1 % (ref 0–2)
LYMPHOCYTES # BLD AUTO: 1.62 THOUSANDS/ΜL (ref 0.6–4.47)
LYMPHOCYTES NFR BLD AUTO: 21 % (ref 14–44)
MCH RBC QN AUTO: 30 PG (ref 26.8–34.3)
MCHC RBC AUTO-ENTMCNC: 33.1 G/DL (ref 31.4–37.4)
MCV RBC AUTO: 91 FL (ref 82–98)
MONOCYTES # BLD AUTO: 0.54 THOUSAND/ΜL (ref 0.17–1.22)
MONOCYTES NFR BLD AUTO: 7 % (ref 4–12)
NEUTROPHILS # BLD AUTO: 5.33 THOUSANDS/ΜL (ref 1.85–7.62)
NEUTS SEG NFR BLD AUTO: 69 % (ref 43–75)
NRBC BLD AUTO-RTO: 0 /100 WBCS
PLATELET # BLD AUTO: 173 THOUSANDS/UL (ref 149–390)
PMV BLD AUTO: 9.5 FL (ref 8.9–12.7)
POTASSIUM SERPL-SCNC: 3.1 MMOL/L (ref 3.5–5.3)
PROT SERPL-MCNC: 6 G/DL (ref 6.4–8.2)
RBC # BLD AUTO: 2.9 MILLION/UL (ref 3.81–5.12)
SODIUM SERPL-SCNC: 138 MMOL/L (ref 136–145)
WBC # BLD AUTO: 7.73 THOUSAND/UL (ref 4.31–10.16)

## 2021-02-09 PROCEDURE — 85014 HEMATOCRIT: CPT | Performed by: PHYSICIAN ASSISTANT

## 2021-02-09 PROCEDURE — 99232 SBSQ HOSP IP/OBS MODERATE 35: CPT | Performed by: INTERNAL MEDICINE

## 2021-02-09 PROCEDURE — C9113 INJ PANTOPRAZOLE SODIUM, VIA: HCPCS | Performed by: NURSE PRACTITIONER

## 2021-02-09 PROCEDURE — 85025 COMPLETE CBC W/AUTO DIFF WBC: CPT | Performed by: INTERNAL MEDICINE

## 2021-02-09 PROCEDURE — 80048 BASIC METABOLIC PNL TOTAL CA: CPT | Performed by: INTERNAL MEDICINE

## 2021-02-09 PROCEDURE — 80076 HEPATIC FUNCTION PANEL: CPT | Performed by: INTERNAL MEDICINE

## 2021-02-09 PROCEDURE — 85018 HEMOGLOBIN: CPT | Performed by: PHYSICIAN ASSISTANT

## 2021-02-09 RX ORDER — POTASSIUM CHLORIDE 14.9 MG/ML
20 INJECTION INTRAVENOUS
Status: COMPLETED | OUTPATIENT
Start: 2021-02-09 | End: 2021-02-09

## 2021-02-09 RX ORDER — CEPHALEXIN 500 MG/1
500 CAPSULE ORAL EVERY 12 HOURS SCHEDULED
Status: DISCONTINUED | OUTPATIENT
Start: 2021-02-10 | End: 2021-02-10 | Stop reason: HOSPADM

## 2021-02-09 RX ADMIN — PILOCARPINE HYDROCHLORIDE 5 MG: 5 TABLET, FILM COATED ORAL at 23:46

## 2021-02-09 RX ADMIN — PANTOPRAZOLE SODIUM 40 MG: 40 INJECTION, POWDER, FOR SOLUTION INTRAVENOUS at 08:04

## 2021-02-09 RX ADMIN — PILOCARPINE HYDROCHLORIDE 5 MG: 5 TABLET, FILM COATED ORAL at 15:09

## 2021-02-09 RX ADMIN — CEFEPIME HYDROCHLORIDE 1000 MG: 2 INJECTION, POWDER, FOR SOLUTION INTRAVENOUS at 10:11

## 2021-02-09 RX ADMIN — PANTOPRAZOLE SODIUM 40 MG: 40 INJECTION, POWDER, FOR SOLUTION INTRAVENOUS at 21:16

## 2021-02-09 RX ADMIN — SODIUM CHLORIDE 75 ML/HR: 0.9 INJECTION, SOLUTION INTRAVENOUS at 03:04

## 2021-02-09 RX ADMIN — PILOCARPINE HYDROCHLORIDE 5 MG: 5 TABLET, FILM COATED ORAL at 05:41

## 2021-02-09 RX ADMIN — POTASSIUM CHLORIDE 20 MEQ: 14.9 INJECTION, SOLUTION INTRAVENOUS at 11:07

## 2021-02-09 RX ADMIN — POTASSIUM CHLORIDE 20 MEQ: 14.9 INJECTION, SOLUTION INTRAVENOUS at 08:04

## 2021-02-09 RX ADMIN — PILOCARPINE HYDROCHLORIDE 5 MG: 5 TABLET, FILM COATED ORAL at 19:04

## 2021-02-09 RX ADMIN — PILOCARPINE HYDROCHLORIDE 5 MG: 5 TABLET, FILM COATED ORAL at 08:05

## 2021-02-09 RX ADMIN — CEFEPIME HYDROCHLORIDE 1000 MG: 2 INJECTION, POWDER, FOR SOLUTION INTRAVENOUS at 21:16

## 2021-02-09 RX ADMIN — OLMESARTAN MEDOXOMIL 40 MG: 40 TABLET, FILM COATED ORAL at 08:06

## 2021-02-09 RX ADMIN — PAROXETINE HYDROCHLORIDE 10 MG: 20 TABLET, FILM COATED ORAL at 08:04

## 2021-02-09 RX ADMIN — SALINE NASAL SPRAY 1 SPRAY: 1.5 SOLUTION NASAL at 21:26

## 2021-02-09 NOTE — ASSESSMENT & PLAN NOTE
Elevated liver enzymes and alkaline phosphatase  Suspected 2/2 antibiotic  Initial CT abdomen showing distended gallbladder however Pt not having any symptoms of cholecystitis  No abdominal pain  Statin on hold  RUQ US not showing any pathology  Antibiotic discontinued      Statin on hold  CMP repeated in 1 week  Follow-up PCP

## 2021-02-09 NOTE — PROGRESS NOTES
GI Progress Note - Velma Arana 76 y o  female MRN: [id number]    Unit/Bed#: ENDO 06-02 Encounter: [id number]    Subjective: She is feeling well today  Reports only a quarter sized amount of BRBPR since her colonoscopy yesterday  No abdominal pain, lightheadedness, or dizziness  She was hoping to go home today but understands  Objective:     Vitals: Blood pressure (!) 185/89, pulse 104, temperature 98 5 °F (36 9 °C), temperature source Oral, resp  rate 16, height 5' 3" (1 6 m), weight 73 kg (160 lb 15 oz), SpO2 96 %, not currently breastfeeding  ,Body mass index is 28 51 kg/m²        Intake/Output Summary (Last 24 hours) at 2/9/2021 1059  Last data filed at 2/9/2021 0304  Gross per 24 hour   Intake 2300 ml   Output 451 ml   Net 1849 ml       Physical Exam:     General Appearance: Alert, oriented x3, no acute distress  Lungs: Clear to auscultation bilaterally, no rales or rhonchi, no labored breathing/accessory muscle use  Heart: Regular rate and rhythm, S1, S2 normal, no murmur, click, rub or gallop  Abdomen: Soft, non-tender, non-distended; bowel sounds normal; no masses or no organomegaly  Extremities: No cyanosis, edema    Invasive Devices     Peripheral Intravenous Line            Peripheral IV (Ped) 02/07/21 Left Forearm 2 days          Drain            Nephrostomy Right 1 8 5 Fr  70 days    Ureteral Drain/Stent Right ureter 6 Fr  4 days                Lab Results:  Results from last 7 days   Lab Units 02/09/21  0533   WBC Thousand/uL 7 73   HEMOGLOBIN g/dL 8 7*   HEMATOCRIT % 26 3*   PLATELETS Thousands/uL 173   NEUTROS PCT % 69   LYMPHS PCT % 21   MONOS PCT % 7   EOS PCT % 2     Results from last 7 days   Lab Units 02/09/21  0533   POTASSIUM mmol/L 3 1*   CHLORIDE mmol/L 107   CO2 mmol/L 22   BUN mg/dL 16   CREATININE mg/dL 0 93   CALCIUM mg/dL 7 7*   ALK PHOS U/L 285*   ALT U/L 148*   AST U/L 151*     Results from last 7 days   Lab Units 02/05/21  1325   INR  1 18           Imaging Studies: I have personally reviewed pertinent imaging studies  Ct Abdomen Pelvis With Contrast  Result Date: 2/5/2021  Impression: 1  Status post replacement of right ureteral stent with significant improvement of previous hydronephrosis  2  Single 1 to 2 mm calculus remaining in the right ureter adjacent to the stent but significantly diminished ureteral calculus burden since prior  3  Right kidney lower pole calculi as above 4  Urothelial enhancement of the renal pelvis, periureteral edema, bladder wall enhancement, suspicious for urinary tract infection  5  Unchanged appearance of left adnexal cyst, hiatal hernia, abdominal aortic aneurysm, and left renal cyst Workstation performed: [id number]       Assessment and Plan:     Acute Blood Loss Anemia  Hematochezia  - Developed on 2/7 after she presented with fever/chills following cystoscopy/stent placement on 2/4  - Colonoscopy yesterday showed a significant amount of pancolonic fresh blood obscuring the view with multiple severe diverticula containing blood and stools, unable to determine source due to large amount of blood present  - Hb stable at 8 7 since last night with only a small amount of BRBPR since yesterday  - Advance to light diet  - Recommend monitoring another 24 hours to ensure no further active bleeding due to colonoscopy results yesterday  - Recheck H/H at 1500 and CBC tomorrow AM    Elevated LFTs  - New as of 2/5, prior to this she had normal LFTs  - Mixed picture of hepatocellular and cholestatic given transaminitis and elevated alk phos  - This could be a DILI from cefazolin that she received at her outpatient cysto on 2/4; she otherwise denies new medications, other abx courses recently, and she does not have symptoms suggestive of biliary colic  - CT on admission showed mild GB distention but otherwise no significant hepatobiliary findings  - Trend CMP      The patient will be seen by Dr Jerry Brower

## 2021-02-09 NOTE — ASSESSMENT & PLAN NOTE
Meeting sepsis criteria with hypotension, leukocytosis and UA showing UTI  Treated with IV Zosyn in ED switched to cefepime  Leukocyte count improving  Urine culture showing growth of low colonies of Candida  Continue cefepime for today and switch to Keflex, antibiotic course completed  Urology consulted, recommendation no treatment for Candida infection

## 2021-02-09 NOTE — PROGRESS NOTES
Progress Note - Jose Parra 1945, 76 y o  female MRN: [id number]    Unit/Bed#: ENDO 06-02 Encounter: [id number]    Primary Care Provider: Kirill Bojorquez PA-C   Date and time admitted to hospital: 2/5/2021  1:10 PM        * Acute GI bleeding  Assessment & Plan  On 02/07/2021 Pt found to have bright red blood per rectum  Hemoglobin dropped from 10-7 8  GI was consulted s/p colonoscopy showing bright red blood suspected 2/2 diverticular vs AVM however definite diagnosis was not able to be identified due to large amount of blood in stool  S/p 2 unit pRBC  Hemoglobin at 8 2  Clinically improving  No blood noted in stool  UTI (urinary tract infection)  Assessment & Plan  Meeting sepsis criteria with hypotension, leukocytosis and UA showing UTI  Treated with IV Zosyn in ED switched to cefepime  Leukocyte count improving  Urine culture showing growth of low colonies of Candida  Continue cefepime for today and switch to Keflex from tomorrow morning  Urology consulted, recommendation no treatment for Candida infection  Nephrolithiasis  Assessment & Plan  Patient underwent cystoscopy  with right ureteral stent placement before admission  Urology on board    Transaminitis  Assessment & Plan  Elevated liver enzymes and alkaline phosphatase  Initial CT abdomen showing distended gallbladder however Pt not having any symptoms of cholecystitis  No abdominal pain  Statin on hold  GI consulted, recommended RUQ US to rule out GB pathology  Statin on hold    Acute kidney injury [facility])  Assessment & Plan  Presented with creatinine 2 3  Baseline around 1  Improved with IV hydration  Currently at baseline  Continue Benicar  Hold IV fluid    Sepsis ([address] )  Assessment & Plan  Evident by WBC count of 18 K, tachycardia, hypotension with source being UTI on admission  Blood cultures no growth    Continue IV antibiotics for UTI    Sepsis following  Cystoscopy and right ureteral stent placement on 21,requiring treatment with IV Zosyn and Cefepime  Hyperlipidemia  Assessment & Plan  Hold statin therapy in the setting of transaminitis  Monitor liver enzymes  Benign hypertension  Assessment & Plan  Continue Benicar  Hydralazine p r n  VTE Pharmacologic Prophylaxis:   Pharmacologic: Pharmacologic VTE Prophylaxis contraindicated due to GI bleed  Mechanical VTE Prophylaxis in Place: Yes    Patient Centered Rounds: I have performed bedside rounds with nursing staff today  Discussions with Specialists or Other Care Team Provider:  GI, Urology    Education and Discussions with Family / Patient:  Pt    Time Spent for Care: More than 50% of total time spent on counseling and coordination of care as described above  Current Length of Stay: 4 day(s)    Current Patient Status: Inpatient   Certification Statement: The patient will continue to require additional inpatient hospital stay due to See above    Discharge Plan:  24-48 hours    Code Status: Level 1 - Full Code      Subjective:   Patient was seen and examined by me at bedside  Communicated clearly  No particular overnight event reported  Hemodynamically stable and afebrile  Patient feels better overall  States not containing any blood in bowel movement this morning  No abdominal pain  No N/V, fever, chest pain, SOB  No dizziness  No shoulder pain  Hemodynamically stable, saturating well on room air  Objective:     Vitals:   Temp (24hrs), Av 5 °F (36 9 °C), Min:98 °F (36 7 °C), Max:98 8 °F (37 1 °C)    Temp:  [98 °F (36 7 °C)-98 8 °F (37 1 °C)] 98 °F (36 7 °C)  HR:  [] 94  Resp:  [15-21] 17  BP: (120-185)/(64-92) 152/92  SpO2:  [96 %-98 %] 97 %  Body mass index is 28 51 kg/m²  Input and Output Summary (last 24 hours):        Intake/Output Summary (Last 24 hours) at 2021 1552  Last data filed at 2021 0800  Gross per 24 hour   Intake 2400 ml   Output 0 ml   Net 2400 ml       Physical Exam:     Physical Exam  Vitals signs reviewed  Constitutional:       General: She is not in acute distress  Appearance: She is well-developed  Cardiovascular:      Rate and Rhythm: Normal rate and regular rhythm  Pulses: Normal pulses  Heart sounds: Normal heart sounds  Pulmonary:      Effort: Pulmonary effort is normal       Breath sounds: Normal breath sounds  Chest:      Chest wall: No tenderness  Abdominal:      General: Bowel sounds are normal  There is no distension  Palpations: Abdomen is soft  Tenderness: There is no abdominal tenderness  Musculoskeletal:      Right lower leg: No edema  Left lower leg: No edema  Skin:     General: Skin is warm  Coloration: Skin is not pale  Neurological:      General: No focal deficit present  Mental Status: She is alert and oriented to person, place, and time  Mental status is at baseline  Psychiatric:         Mood and Affect: Mood normal          Behavior: Behavior normal        Additional Data:     Labs:    Results from last 7 days   Lab Units 02/09/21  1538 02/09/21  0533  02/06/21  0557   WBC Thousand/uL  --  7 73   < > 11 35*   HEMOGLOBIN g/dL 8 9* 8 7*   < > 9 2*   HEMATOCRIT % 27 1* 26 3*   < > 29 4*   PLATELETS Thousands/uL  --  173   < > 226   BANDS PCT %  --   --   --  13*   NEUTROS PCT %  --  69   < >  --    LYMPHS PCT %  --  21   < >  --    LYMPHO PCT %  --   --   --  6*   MONOS PCT %  --  7   < >  --    MONO PCT %  --   --   --  1*   EOS PCT %  --  2   < > 0    < > = values in this interval not displayed       Results from last 7 days   Lab Units 02/09/21  0533   SODIUM mmol/L 138   POTASSIUM mmol/L 3 1*   CHLORIDE mmol/L 107   CO2 mmol/L 22   BUN mg/dL 16   CREATININE mg/dL 0 93   ANION GAP mmol/L 9   CALCIUM mg/dL 7 7*   ALBUMIN g/dL 1 9*   TOTAL BILIRUBIN mg/dL 0 50   ALK PHOS U/L 285*   ALT U/L 148*   AST U/L 151*   GLUCOSE RANDOM mg/dL 96     Results from last 7 days   Lab Units 02/05/21  1325   INR  1 18 Results from last 7 days   Lab Units 02/06/21  0557 02/05/21  1325   LACTIC ACID mmol/L  --  2 0   PROCALCITONIN ng/ml 2 40* 3 53*           * I Have Reviewed All Lab Data Listed Above  * Additional Pertinent Lab Tests Reviewed: All Labs Within Last 24 Hours Reviewed    Imaging:    Imaging Reports Reviewed Today Include:   Imaging Personally Reviewed by Myself Includes:      Recent Cultures (last 7 days):     Results from last 7 days   Lab Units 02/05/21  1441 02/05/21  1430 02/05/21  1325   BLOOD CULTURE   --  No Growth at 72 hrs  No Growth at 72 hrs  URINE CULTURE  20,000-29,000 cfu/ml Candida albicans*  --   --        Last 24 Hours Medication List:   Current Facility-Administered Medications   Medication Dose Route Frequency Provider Last Rate    acetaminophen  650 mg Oral Q6H PRN Jose Rafael Vargas MD      ALPRAZolam  0 25 mg Oral TID PRN Coby Ho MD      cefepime  1,000 mg Intravenous Q12H May England MD 1,000 mg (02/09/21 1011)    [START ON 2/10/2021] cephalexin  500 mg Oral Q12H [address] May England MD      hydrALAZINE  5 mg Intravenous Q6H PRN SILVER Watson      olmesartan  40 mg Oral Daily Coby Ho MD      ondansetron  4 mg Intravenous Q6H PRN Jose Rafael Vargas MD      pantoprazole  40 mg Intravenous Q12H [facility]      PARoxetine  10 mg Oral Daily Jose Rafael Vargas MD      pilocarpine  5 mg Oral Q4H Coby Ho MD      sodium chloride  1 spray Each Nare Q2H PRN SILVER Watson          Today, Patient Was Seen By: May England MD    ** Please Note: Dictation voice to text software may have been used in the creation of this document   **

## 2021-02-09 NOTE — ASSESSMENT & PLAN NOTE
Patient underwent cystoscopy  with right ureteral stent placement before admission  Follow-up urology outpatient

## 2021-02-09 NOTE — PLAN OF CARE
Problem: Potential for Falls  Goal: Patient will remain free of falls  Description: INTERVENTIONS:  - Assess patient frequently for physical needs  -  Identify cognitive and physical deficits and behaviors that affect risk of falls    -  [place] fall precautions as indicated by assessment   - Educate patient/family on patient safety including physical limitations  - Instruct patient to call for assistance with activity based on assessment  - Modify environment to reduce risk of injury  - Consider OT/PT consult to assist with strengthening/mobility  Outcome: Progressing     Problem: PAIN - ADULT  Goal: Verbalizes/displays adequate comfort level or baseline comfort level  Description: Interventions:  - Encourage patient to monitor pain and request assistance  - Assess pain using appropriate pain scale  - Administer analgesics based on type and severity of pain and evaluate response  - Implement non-pharmacological measures as appropriate and evaluate response  - Consider cultural and social influences on pain and pain management  - Notify physician/advanced practitioner if interventions unsuccessful or patient reports new pain  Outcome: Progressing     Problem: INFECTION - ADULT  Goal: Absence or prevention of progression during hospitalization  Description: INTERVENTIONS:  - Assess and monitor for signs and symptoms of infection  - Monitor lab/diagnostic results  - Monitor all insertion sites, i e  indwelling lines, tubes, and drains  - Monitor endotracheal if appropriate and nasal secretions for changes in amount and color  - [place] appropriate cooling/warming therapies per order  - Administer medications as ordered  - Instruct and encourage patient and family to use good hand hygiene technique  - Identify and instruct in appropriate isolation precautions for identified infection/condition  Outcome: Progressing  Goal: Absence of fever/infection during neutropenic period  Description: INTERVENTIONS:  - Monitor WBC    Outcome: Progressing     Problem: SAFETY ADULT  Goal: Patient will remain free of falls  Description: INTERVENTIONS:  - Assess patient frequently for physical needs  -  Identify cognitive and physical deficits and behaviors that affect risk of falls    -  [place] fall precautions as indicated by assessment   - Educate patient/family on patient safety including physical limitations  - Instruct patient to call for assistance with activity based on assessment  - Modify environment to reduce risk of injury  - Consider OT/PT consult to assist with strengthening/mobility  Outcome: Progressing  Goal: Maintain or return to baseline ADL function  Description: INTERVENTIONS:  -  Assess patient's ability to carry out ADLs; assess patient's baseline for ADL function and identify physical deficits which impact ability to perform ADLs (bathing, care of mouth/teeth, toileting, grooming, dressing, etc )  - Assess/evaluate cause of self-care deficits   - Assess range of motion  - Assess patient's mobility; develop plan if impaired  - Assess patient's need for assistive devices and provide as appropriate  - Encourage maximum independence but intervene and supervise when necessary  - Involve family in performance of ADLs  - Assess for home care needs following discharge   - Consider OT consult to assist with ADL evaluation and planning for discharge  - Provide patient education as appropriate  Outcome: Progressing  Goal: Maintain or return mobility status to optimal level  Description: INTERVENTIONS:  - Assess patient's baseline mobility status (ambulation, transfers, stairs, etc )    - Identify cognitive and physical deficits and behaviors that affect mobility  - Identify mobility aids required to assist with transfers and/or ambulation (gait belt, sit-to-stand, lift, walker, cane, etc )  - [place] fall precautions as indicated by assessment  - Record patient progress and toleration of activity level on Mobility SBAR; progress patient to next Phase/Stage  - Instruct patient to call for assistance with activity based on assessment  - Consider rehabilitation consult to assist with strengthening/weightbearing, etc   Outcome: Progressing     Problem: DISCHARGE PLANNING  Goal: Discharge to home or other facility with appropriate resources  Description: INTERVENTIONS:  - Identify barriers to discharge w/patient and caregiver  - Arrange for needed discharge resources and transportation as appropriate  - Identify discharge learning needs (meds, wound care, etc )  - Arrange for interpretive services to assist at discharge as needed  - Refer to Case Management Department for coordinating discharge planning if the patient needs post-hospital services based on physician/advanced practitioner order or complex needs related to functional status, cognitive ability, or social support system  Outcome: Progressing     Problem: Knowledge Deficit  Goal: Patient/family/caregiver demonstrates understanding of disease process, treatment plan, medications, and discharge instructions  Description: Complete learning assessment and assess knowledge base  Interventions:  - Provide teaching at level of understanding  - Provide teaching via preferred learning methods  Outcome: Progressing     Problem: Nutrition/Hydration-ADULT  Goal: Nutrient/Hydration intake appropriate for improving, restoring or maintaining nutritional needs  Description: Monitor and assess patient's nutrition/hydration status for malnutrition  Collaborate with interdisciplinary team and initiate plan and interventions as ordered  Monitor patient's weight and dietary intake as ordered or per policy  Utilize nutrition screening tool and intervene as necessary  Determine patient's food preferences and provide high-protein, high-caloric foods as appropriate       INTERVENTIONS:  - Monitor oral intake, urinary output, labs, and treatment plans  - Assess nutrition and hydration status and recommend course of action  - Evaluate amount of meals eaten  - Assist patient with eating if necessary   - Allow adequate time for meals  - Recommend/ encourage appropriate diets, oral nutritional supplements, and vitamin/mineral supplements  - Order, calculate, and assess calorie counts as needed  - Recommend, monitor, and adjust tube feedings and TPN/PPN based on assessed needs  - Assess need for intravenous fluids  - Provide specific nutrition/hydration education as appropriate  - Include patient/family/caregiver in decisions related to nutrition  Outcome: Progressing

## 2021-02-09 NOTE — ASSESSMENT & PLAN NOTE
On 02/07/2021 Pt found to have bright red blood per rectum  Hemoglobin dropped from 10-7 8  GI was consulted s/p colonoscopy showing bright red blood suspected 2/2 diverticular vs AVM however definite diagnosis was not able to be identified due to large amount of blood in stool  S/p 2 unit pRBC  Hemoglobin stable no active bleeding present     Clinically improving      Follow-up GI regarding need for colonoscopy to be repeated or not  CBC repeat in 1 week

## 2021-02-10 ENCOUNTER — APPOINTMENT (INPATIENT)
Dept: ULTRASOUND IMAGING | Facility: HOSPITAL | Age: 76
DRG: 856 | End: 2021-02-10
Payer: MEDICARE

## 2021-02-10 VITALS
RESPIRATION RATE: 19 BRPM | WEIGHT: 160.94 LBS | SYSTOLIC BLOOD PRESSURE: 142 MMHG | BODY MASS INDEX: 28.52 KG/M2 | HEIGHT: 63 IN | HEART RATE: 99 BPM | TEMPERATURE: 98.6 F | DIASTOLIC BLOOD PRESSURE: 92 MMHG | OXYGEN SATURATION: 96 %

## 2021-02-10 DIAGNOSIS — R79.89 ELEVATED LFTS: Primary | ICD-10-CM

## 2021-02-10 PROBLEM — K62.5 RECTAL BLEEDING: Status: RESOLVED | Noted: 2021-02-07 | Resolved: 2021-02-10

## 2021-02-10 PROBLEM — N20.0 NEPHROLITHIASIS: Status: RESOLVED | Noted: 2017-02-15 | Resolved: 2021-02-10

## 2021-02-10 PROBLEM — N17.9 ACUTE KIDNEY INJURY (HCC): Status: RESOLVED | Noted: 2021-02-05 | Resolved: 2021-02-10

## 2021-02-10 PROBLEM — A41.9 SEPSIS (HCC): Status: RESOLVED | Noted: 2021-02-05 | Resolved: 2021-02-10

## 2021-02-10 PROBLEM — K92.2 ACUTE GI BLEEDING: Status: RESOLVED | Noted: 2021-02-07 | Resolved: 2021-02-10

## 2021-02-10 PROBLEM — N39.0 UTI (URINARY TRACT INFECTION): Status: RESOLVED | Noted: 2021-02-05 | Resolved: 2021-02-10

## 2021-02-10 LAB
ALBUMIN SERPL BCP-MCNC: 1.9 G/DL (ref 3.5–5)
ALP SERPL-CCNC: 321 U/L (ref 46–116)
ALT SERPL W P-5'-P-CCNC: 160 U/L (ref 12–78)
ANION GAP SERPL CALCULATED.3IONS-SCNC: 8 MMOL/L (ref 4–13)
AST SERPL W P-5'-P-CCNC: 125 U/L (ref 5–45)
BACTERIA BLD CULT: NORMAL
BACTERIA BLD CULT: NORMAL
BILIRUB SERPL-MCNC: 0.4 MG/DL (ref 0.2–1)
BUN SERPL-MCNC: 12 MG/DL (ref 5–25)
CALCIUM ALBUM COR SERPL-MCNC: 9.4 MG/DL (ref 8.3–10.1)
CALCIUM OXALATE DIHYDRATE MFR STONE IR: 10 %
CALCIUM SERPL-MCNC: 7.7 MG/DL (ref 8.3–10.1)
CHLORIDE SERPL-SCNC: 106 MMOL/L (ref 100–108)
CO2 SERPL-SCNC: 24 MMOL/L (ref 21–32)
COLOR STONE: NORMAL
COM MFR STONE: 90 %
COMMENT-STONE3: NORMAL
COMPOSITION: NORMAL
CREAT SERPL-MCNC: 0.93 MG/DL (ref 0.6–1.3)
ERYTHROCYTE [DISTWIDTH] IN BLOOD BY AUTOMATED COUNT: 14.2 % (ref 11.6–15.1)
GFR SERPL CREATININE-BSD FRML MDRD: 60 ML/MIN/1.73SQ M
GLUCOSE SERPL-MCNC: 111 MG/DL (ref 65–140)
HCT VFR BLD AUTO: 25.6 % (ref 34.8–46.1)
HGB BLD-MCNC: 8.5 G/DL (ref 11.5–15.4)
INR PPP: 1.1 (ref 0.84–1.19)
LABORATORY COMMENT REPORT: NORMAL
MCH RBC QN AUTO: 29.8 PG (ref 26.8–34.3)
MCHC RBC AUTO-ENTMCNC: 33.2 G/DL (ref 31.4–37.4)
MCV RBC AUTO: 90 FL (ref 82–98)
PHOTO: NORMAL
PLATELET # BLD AUTO: 186 THOUSANDS/UL (ref 149–390)
PMV BLD AUTO: 10 FL (ref 8.9–12.7)
POTASSIUM SERPL-SCNC: 3.2 MMOL/L (ref 3.5–5.3)
PROT SERPL-MCNC: 6 G/DL (ref 6.4–8.2)
PROTHROMBIN TIME: 13.6 SECONDS (ref 11.6–14.5)
RBC # BLD AUTO: 2.85 MILLION/UL (ref 3.81–5.12)
SIZE STONE: NORMAL MM
SODIUM SERPL-SCNC: 138 MMOL/L (ref 136–145)
SPEC SOURCE SUBJ: NORMAL
STONE ANALYSIS-IMP: NORMAL
WBC # BLD AUTO: 9.17 THOUSAND/UL (ref 4.31–10.16)
WT STONE: 136 MG

## 2021-02-10 PROCEDURE — 85027 COMPLETE CBC AUTOMATED: CPT | Performed by: PHYSICIAN ASSISTANT

## 2021-02-10 PROCEDURE — C9113 INJ PANTOPRAZOLE SODIUM, VIA: HCPCS | Performed by: NURSE PRACTITIONER

## 2021-02-10 PROCEDURE — 76705 ECHO EXAM OF ABDOMEN: CPT

## 2021-02-10 PROCEDURE — 80053 COMPREHEN METABOLIC PANEL: CPT | Performed by: PHYSICIAN ASSISTANT

## 2021-02-10 PROCEDURE — 85610 PROTHROMBIN TIME: CPT | Performed by: INTERNAL MEDICINE

## 2021-02-10 PROCEDURE — 99232 SBSQ HOSP IP/OBS MODERATE 35: CPT | Performed by: PHYSICIAN ASSISTANT

## 2021-02-10 PROCEDURE — 99239 HOSP IP/OBS DSCHRG MGMT >30: CPT | Performed by: INTERNAL MEDICINE

## 2021-02-10 RX ORDER — POTASSIUM CHLORIDE 14.9 MG/ML
20 INJECTION INTRAVENOUS
Status: COMPLETED | OUTPATIENT
Start: 2021-02-10 | End: 2021-02-10

## 2021-02-10 RX ORDER — POTASSIUM CHLORIDE 20 MEQ/1
40 TABLET, EXTENDED RELEASE ORAL ONCE
Status: COMPLETED | OUTPATIENT
Start: 2021-02-10 | End: 2021-02-10

## 2021-02-10 RX ORDER — POTASSIUM CHLORIDE 29.8 MG/ML
40 INJECTION INTRAVENOUS
Status: DISCONTINUED | OUTPATIENT
Start: 2021-02-10 | End: 2021-02-10

## 2021-02-10 RX ADMIN — PILOCARPINE HYDROCHLORIDE 5 MG: 5 TABLET, FILM COATED ORAL at 05:39

## 2021-02-10 RX ADMIN — POTASSIUM CHLORIDE 20 MEQ: 200 INJECTION, SOLUTION INTRAVENOUS at 08:46

## 2021-02-10 RX ADMIN — PANTOPRAZOLE SODIUM 40 MG: 40 INJECTION, POWDER, FOR SOLUTION INTRAVENOUS at 08:35

## 2021-02-10 RX ADMIN — CEPHALEXIN 500 MG: 500 CAPSULE ORAL at 08:35

## 2021-02-10 RX ADMIN — POTASSIUM CHLORIDE 40 MEQ: 1500 TABLET, EXTENDED RELEASE ORAL at 08:35

## 2021-02-10 RX ADMIN — POTASSIUM CHLORIDE 20 MEQ: 200 INJECTION, SOLUTION INTRAVENOUS at 11:11

## 2021-02-10 RX ADMIN — PILOCARPINE HYDROCHLORIDE 5 MG: 5 TABLET, FILM COATED ORAL at 12:02

## 2021-02-10 RX ADMIN — PAROXETINE HYDROCHLORIDE 10 MG: 20 TABLET, FILM COATED ORAL at 08:35

## 2021-02-10 RX ADMIN — OLMESARTAN MEDOXOMIL 40 MG: 40 TABLET, FILM COATED ORAL at 08:49

## 2021-02-10 RX ADMIN — PILOCARPINE HYDROCHLORIDE 5 MG: 5 TABLET, FILM COATED ORAL at 08:49

## 2021-02-10 NOTE — DISCHARGE INSTR - AVS FIRST PAGE
DISCHARGE INSTRUCTIONS   1  Follow up with Dr Jumana Lucio PA-C in one week  in regards your elevated liver enzymes, medications and lab results  Follow ups:-  Follow-up with urology regarding urinary stent  Follow-up with Gastroenterology regarding your rectal bleeding and regarding need for colonoscopy to be repeated or not  Labs:-  Complete blood count and complete metabolic profile in 1 week and follow up results with your primary care doctor  2  Take medications regularly     New medication:-  None    Change medication:-  None    Discontinue medication:-  Tylenol:-ask your PCP for restarting  Keflex capsule  Docusate sodium  Naproxen tablet:-ask your PCP for restarting  Oxycodone tablet  Pyridium tablet  Crestor tablet:-ask your PCP for restarting  3  Come back to the ER if symptoms recur or worsen   4  Activity as tolerated  5  Diet :  Regular diet with high iron content  6  Year advised to repeat blood test mentioned above and follow-up results with your PCP  7  Your cholesterol medication was put on hold due to elevated liver enzymes so consult PCP for restarting  8  Avoid Tylenol and pain medication for now as they might be contributing to elevated kidney numbers

## 2021-02-10 NOTE — CASE MANAGEMENT
2/10  Dc today  x of GIB  No bleeding, hgb stable  She is  independent in all aspects, drives and employed DC PLAN HOME WITH OP FOLLOW UP  PATIENT HAS RIDE HOME  Discussed dc plan with nursing

## 2021-02-10 NOTE — DISCHARGE SUMMARY
Discharge- Jose [place] 1945, 76 y o  female MRN: [id number]    Unit/Bed#: ENDO 06-02 Encounter: [id number]    Primary Care Provider: Kirill Bojorquez PA-C   Date and time admitted to hospital: 2/5/2021  1:10 PM        * Acute GI bleeding-resolved as of 2/10/2021  Assessment & Plan  On 02/07/2021 Pt found to have bright red blood per rectum  Hemoglobin dropped from 10-7 8  GI was consulted s/p colonoscopy showing bright red blood suspected 2/2 diverticular vs AVM however definite diagnosis was not able to be identified due to large amount of blood in stool  S/p 2 unit pRBC  Hemoglobin stable no active bleeding present     Clinically improving  Follow-up GI regarding need for colonoscopy to be repeated or not  CBC repeat in 1 week      UTI (urinary tract infection)-resolved as of 2/10/2021  Assessment & Plan  Meeting sepsis criteria with hypotension, leukocytosis and UA showing UTI  Treated with IV Zosyn in ED switched to cefepime  Leukocyte count improving  Urine culture showing growth of low colonies of Candida  Continue cefepime for today and switch to Keflex, antibiotic course completed  Urology consulted, recommendation no treatment for Candida infection  Nephrolithiasis  Assessment & Plan  Patient underwent cystoscopy  with right ureteral stent placement before admission  Follow-up urology outpatient  Transaminitis  Assessment & Plan  Elevated liver enzymes and alkaline phosphatase  Suspected 2/2 antibiotic  Initial CT abdomen showing distended gallbladder however Pt not having any symptoms of cholecystitis  No abdominal pain  Statin on hold  RUQ US not showing any pathology  Antibiotic discontinued  Statin on hold  CMP repeated in 1 week  Follow-up PCP    Hyperlipidemia  Assessment & Plan  Hold statin therapy in the setting of transaminitis  Monitor CMP in 1 week  Benign hypertension  Assessment & Plan  Continue Benicar      Acute kidney injury (HCC)-resolved as of 2/10/2021  Assessment & Plan  Elevated creatinine of 2 3 on admission baseline around 1  Treated with IV fluid  Continue Benicar  Sepsis (HCC)-resolved as of 2/10/2021  Assessment & Plan  Evident by WBC count of 18 K, tachycardia, hypotension with source being UTI on admission  Blood cultures no growth  Continue IV antibiotics for UTI    Sepsis following  Cystoscopy and right ureteral stent placement on 12/4/21,requiring treatment with IV Zosyn and Cefepime  Discharging Resident Physician: Becca Montez MD  Attending: No att  providers found  PCP: Genia Castillo PA-C  Admission Date: 2/5/2021  Admission Date:   Admission Orders (From admission, onward)     Ordered        02/05/21 1531  Inpatient Admission  Once                   Discharge Date: 02/10/21    Disposition:     Home    Reason for Admission:  UTI with sepsis, LINDSEY    Consultations During Hospital Stay:  · GI  · Urology    Procedures Performed:     No orders of the defined types were placed in this encounter  Diagnosis:    Resolved Problems  Date Reviewed: 2/10/2021          Resolved    UTI (urinary tract infection) 2/10/2021     Resolved by  Becca Montez MD    Sepsis ([facility] [address] ) 2/10/2021     Resolved by  Becca Montez MD    Acute kidney injury ([facility] [address] ) 2/10/2021     Resolved by  Becca Montez MD    Rectal bleeding 2/10/2021     Resolved by  Becca Montez MD    * (Principal) Acute GI bleeding 2/10/2021     Resolved by  Becca Montez MD          Active Problems:    Nephrolithiasis    Benign hypertension    Hyperlipidemia    Transaminitis      Significant Findings / Test Results:     · Fever chills HTN elevated leukocyte count  · Blood culture showing Candida species  · Elevated creatinine  · UA showing UTI  · Bright red blood per rectum  · H/o surgical procedure before 1 day of admission    · Elevated liver enzyme    Incidental Findings:   · None     Test Results Pending at Discharge (will require follow up):   · None     Outpatient Tests Requested:  · CBC and CMP in 1 week    Complications:  None    Hospital Course:     Jenni Voss is a 76 y o  female patient after getting ureteral stent placed for obstructing kidney stone 1 day before coming to ED who originally presented to the hospital on 2/5/2021 due to worsening fever chills  Her vitals showing HTN and tachycardia  UA showing UTI  Admitted for further evaluation of sepsis found to have elevated creatinine from baseline with diagnosis of LINDSEY  AKA was treated with IV fluid  Initially treated with Zosyn switched to cefepime and Keflex afterwards  Urology was consulted stated continue current treatment follow-up in 2 weeks after discharge  During hospitalization Pt started having bright red blood per rectum suspected 2/2 VTE prophylaxis  GI was consulted recommended colonoscopy in which bright red blood was noted of scarring field from which probable diagnosis will be diverticular bleed vs AVM  VTE prophylaxis were head  Hemoglobin level dropped s/p 2 units pRBC  Hemoglobin stable on discharge  No active bleeding present  No blood per rectum  Symptoms improved  On discharge Pt was hemodynamically stable, tolerating diet well  Repeat CBC on discharge in 1 week  Due to prolonged antibiotic therapy Pt noted to have elevated liver enzyme with cholestatic kind of pictures  RUQ US showing unremarkable findings  Statin was put on hold and continue to hold on discharge  Repeat CMP in 1 week  Antibiotic stopped after completing course  Condition at Discharge: stable     Discharge Day Visit / Exam:     Subjective:  Patient was seen and examined by me at bedside  Communicated clearly  No particular overnight event reported  Hemodynamically stable and afebrile  Patient feels better overall  No blood per rectum and stool present  Overall feeling better      Vitals: Blood Pressure: 142/92 (02/09/21 2100)  Pulse: 99 (02/10/21 0700)  Temperature: 98 6 °F (37 °C) (02/09/21 2100)  Temp Source: Oral (02/09/21 2100)  Respirations: 19 (02/10/21 0700)  Height: 5' 3" (160 cm) (02/05/21 1631)  Weight - Scale: 73 kg (160 lb 15 oz) (02/05/21 1631)  SpO2: 96 % (02/10/21 0700)  Exam:   Physical Exam  Vitals signs reviewed  Constitutional:       General: She is not in acute distress  Appearance: She is well-developed  Cardiovascular:      Rate and Rhythm: Normal rate and regular rhythm  Pulses: Normal pulses  Heart sounds: Normal heart sounds  Pulmonary:      Effort: Pulmonary effort is normal       Breath sounds: Normal breath sounds  Chest:      Chest wall: No tenderness  Abdominal:      General: Bowel sounds are normal  There is no distension  Palpations: Abdomen is soft  Tenderness: There is no abdominal tenderness  Musculoskeletal:      Right lower leg: No edema  Left lower leg: No edema  Skin:     General: Skin is warm  Coloration: Skin is not pale  Neurological:      General: No focal deficit present  Mental Status: She is alert and oriented to person, place, and time  Mental status is at baseline  Psychiatric:         Mood and Affect: Mood normal          Behavior: Behavior normal        Discussion with Family:  None    Discharge instructions/Information to patient and family:   See after visit summary for information provided to patient and family  Provisions for Follow-Up Care:  See after visit summary for information related to follow-up care and any pertinent home health orders  Planned Readmission:  None     Discharge Medications:  See after visit summary for reconciled discharge medications provided to patient and family  Discharge Statement :  I spent 25 minutes discharging the patient  This time was spent on the day of discharge  I had direct contact with the patient on the day of discharge   Additional documentation is required if more than 30 minutes were spent on discharge           ** Please Note: This note has been constructed using a voice recognition system **

## 2021-02-10 NOTE — PROGRESS NOTES
GI Progress Note - Viviane Baptiste 76 y o  female MRN: [id number]    Unit/Bed#: ENDO 06-02 Encounter: [id number]    Subjective: She feels great today, denies any further episodes of BRBPR since Monday night  She is hoping to go home today  Objective:     Vitals: Blood pressure 142/92, pulse 99, temperature 98 6 °F (37 °C), temperature source Oral, resp  rate 19, height 5' 3" (1 6 m), weight 73 kg (160 lb 15 oz), SpO2 96 %, not currently breastfeeding  ,Body mass index is 28 51 kg/m²  Intake/Output Summary (Last 24 hours) at 2/10/2021 1144  Last data filed at 2/10/2021 0900  Gross per 24 hour   Intake 420 ml   Output 0 ml   Net 420 ml       Physical Exam:     General Appearance: Alert, oriented x3, nontoxic appearing  Lungs: Clear to auscultation bilaterally, no rales or rhonchi, no labored breathing/accessory muscle use  Heart: Regular rate and rhythm, S1, S2 normal, no murmur, click, rub or gallop  Abdomen: Soft, non-tender, non-distended; bowel sounds normal; no masses or no organomegaly  Extremities: No cyanosis, edema    Invasive Devices     Peripheral Intravenous Line            Peripheral IV (Ped) 02/07/21 Left Forearm 3 days          Drain            Nephrostomy Right 1 8 5 Fr  71 days    Ureteral Drain/Stent Right ureter 6 Fr  6 days                Lab Results:  Results from last 7 days   Lab Units 02/10/21  0540  02/09/21  0533   WBC Thousand/uL 9 17  --  7 73   HEMOGLOBIN g/dL 8 5*   < > 8 7*   HEMATOCRIT % 25 6*   < > 26 3*   PLATELETS Thousands/uL 186  --  173   NEUTROS PCT %  --   --  69   LYMPHS PCT %  --   --  21   MONOS PCT %  --   --  7   EOS PCT %  --   --  2    < > = values in this interval not displayed       Results from last 7 days   Lab Units 02/10/21  0540   POTASSIUM mmol/L 3 2*   CHLORIDE mmol/L 106   CO2 mmol/L 24   BUN mg/dL 12   CREATININE mg/dL 0 93   CALCIUM mg/dL 7 7*   ALK PHOS U/L 321*   ALT U/L 160*   AST U/L 125*     Results from last 7 days   Lab Units 02/10/21  0540 INR  1  10           Imaging Studies: I have personally reviewed pertinent imaging studies  Us Right Upper Quadrant  Result Date: 2/10/2021  Impression: 1  Right ureteral stent  2   Abdominal aortic aneurysm  3   Limited evaluation of the pancreas  4   Otherwise normal right upper quadrant abdominal sonogram  Workstation performed: [id number]     Ct Abdomen Pelvis With Contrast  Result Date: 2/5/2021  Impression: 1  Status post replacement of right ureteral stent with significant improvement of previous hydronephrosis  2  Single 1 to 2 mm calculus remaining in the right ureter adjacent to the stent but significantly diminished ureteral calculus burden since prior  3  Right kidney lower pole calculi as above 4  Urothelial enhancement of the renal pelvis, periureteral edema, bladder wall enhancement, suspicious for urinary tract infection   5  Unchanged appearance of left adnexal cyst, hiatal hernia, abdominal aortic aneurysm, and left renal cyst Workstation performed: [id number]       Assessment and Plan:     Acute Blood Loss Anemia  Hematochezia  - Developed on 2/7 after she presented with fever/chills following cystoscopy/stent placement on 2/4  - Colonoscopy on 2/8 showed a significant amount of pancolonic fresh blood obscuring the view with multiple severe diverticula containing blood and stools, unable to determine source due to large amount of blood present but suspect this was a diverticular bleed  - Hb has remained stable since Monday night around 8 5 without any further overt bleeding in over 24 hours now  - Okay for discharge from a GI standpoint     Elevated LFTs  - New as of 2/5, prior to this she had normal LFTs  - Mixed picture of hepatocellular and cholestatic given transaminitis and elevated alk phos  - This could be a DILI from abx, she has been on cefazolin and cefepime for coverage of UTI with recent cystoscopy/stent placement  - LFTs relatively stable, RUQ US unremarkable  - Will arrange for an outpatient CMP in 1 week and follow up in our office in the next 2-3 weeks      The patient will be seen by Dr Kevin Fernandes   The patient is stable for discharge from a GI standpoint

## 2021-02-11 ENCOUNTER — TRANSITIONAL CARE MANAGEMENT (OUTPATIENT)
Dept: INTERNAL MEDICINE CLINIC | Facility: CLINIC | Age: 76
End: 2021-02-11

## 2021-02-11 DIAGNOSIS — N20.0 NEPHROLITHIASIS: ICD-10-CM

## 2021-02-11 LAB
COLOR STONE: NORMAL
COM MFR STONE: 100 %
COMMENT-STONE3: NORMAL
COMPOSITION: NORMAL
LABORATORY COMMENT REPORT: NORMAL
PHOTO: NORMAL
SIZE STONE: NORMAL MM
SPEC SOURCE SUBJ: NORMAL
STONE ANALYSIS-IMP: NORMAL
WT STONE: 92 MG

## 2021-02-11 RX ORDER — TAMSULOSIN HYDROCHLORIDE 0.4 MG/1
CAPSULE ORAL
Qty: 15 CAPSULE | Refills: 0 | Status: SHIPPED | OUTPATIENT
Start: 2021-02-11 | End: 2021-04-26 | Stop reason: CLARIF

## 2021-02-11 NOTE — TELEPHONE ENCOUNTER
I spoke with Wes Ramires  Following hospital discharge for complicated UTI status post ureteroscopy  He is doing very well  Discussed that her CT scan obtained while inpatient shows complete removal of her ureteral calculi as well as improved hydronephrosis  Plan is for cystoscopy and ureteral stent removal in our office in the near future        We will make a note to administer 1 g intramuscular Rocephin at the time of that procedure     patient appreciated my call

## 2021-02-11 NOTE — TELEPHONE ENCOUNTER
Cysto stent removal scheduled for 3/3/21 at 11:30 at the [facility] office with Dr Reinoso  Please call to confirm  Thank you

## 2021-02-12 ENCOUNTER — TELEPHONE (OUTPATIENT)
Dept: GASTROENTEROLOGY | Facility: CLINIC | Age: 76
End: 2021-02-12

## 2021-02-12 NOTE — TELEPHONE ENCOUNTER
----- Message from Nilesh Wilson PA-C sent at 2/10/2021 11:50 AM EST -----  Please make her a follow up appt with me or Aparna in the next 2-3 weeks as hospital follow up  She will be discharged today  Thanks!

## 2021-02-13 ENCOUNTER — NURSE TRIAGE (OUTPATIENT)
Dept: OTHER | Facility: OTHER | Age: 76
End: 2021-02-13

## 2021-02-13 NOTE — TELEPHONE ENCOUNTER
Regardin75 Y/O HOSP DISCHARGE SLEEPING ALOT DGTR CONCERN  ----- Message from Myron Neal sent at 2021 11:29 AM EST -----  Patient's daughter called she had surgery on  for removal of kidney stones ended up staying in hospital til Wed 2/10 due to an infection that lead to a acute gi bleed and 2 blood transfusions  She is concern due to she is sleeping so much since coming home from the hospital  When she is up she is fine but she not up alot during the day last night her bp 174/123 good appetite/fluids in take good  Daughter requested me to document that she is not letting her go back into the hospital without advocate being with her

## 2021-02-13 NOTE — TELEPHONE ENCOUNTER
Pt's fatigue is consistent with expected fatigue after surgery  Current /100  She has an appointment with her PCP on 2/19 as well as follow ups with nephrology and urology  Instructed her to call back if fatigue worsens  Verbalized understanding  Reason for Disposition   Mild weakness or fatigue with acute minor illness (e g , colds)    Answer Assessment - Initial Assessment Questions  1  DESCRIPTION: "Describe how you are feeling "      Tired  2  SEVERITY: "How bad is it?"  "Can you stand and walk?"    - MILD - Feels weak or tired, but does not interfere with work, school or normal activities    - [facility] to stand and walk; weakness interferes with work, school, or normal activities    - SEVERE - Unable to stand or walk      Mild to moderate  Sleeping a lot  3  ONSET:  "When did the weakness begin?"      Since being discharged from the hospital   4  CAUSE: "What do you think is causing the weakness?"      Recent surgery  5  MEDICINES: "Have you recently started a new medicine or had a change in the amount of a medicine?"      No  6  OTHER SYMPTOMS: "Do you have any other symptoms?" (e g , chest pain, fever, cough, SOB, vomiting, diarrhea, bleeding, other areas of pain)      No  7   PREGNANCY: "Is there any chance you are pregnant?" "When was your last menstrual period?"      No    Protocols used: WEAKNESS (GENERALIZED) AND FATIGUE-ADULT-

## 2021-02-17 ENCOUNTER — LAB (OUTPATIENT)
Dept: LAB | Facility: HOSPITAL | Age: 76
End: 2021-02-17
Attending: UROLOGY
Payer: MEDICARE

## 2021-02-17 DIAGNOSIS — K62.5 RECTAL BLEEDING: ICD-10-CM

## 2021-02-17 DIAGNOSIS — R79.89 ELEVATED LFTS: ICD-10-CM

## 2021-02-17 DIAGNOSIS — K92.2 ACUTE GI BLEEDING: ICD-10-CM

## 2021-02-17 DIAGNOSIS — N20.0 NEPHROLITHIASIS: ICD-10-CM

## 2021-02-17 LAB
ALBUMIN SERPL BCP-MCNC: 2.5 G/DL (ref 3.5–5)
ALP SERPL-CCNC: 180 U/L (ref 46–116)
ALT SERPL W P-5'-P-CCNC: 44 U/L (ref 12–78)
ANION GAP SERPL CALCULATED.3IONS-SCNC: 8 MMOL/L (ref 4–13)
AST SERPL W P-5'-P-CCNC: 20 U/L (ref 5–45)
BASOPHILS # BLD AUTO: 0.04 THOUSANDS/ΜL (ref 0–0.1)
BASOPHILS NFR BLD AUTO: 1 % (ref 0–1)
BILIRUB SERPL-MCNC: 0.3 MG/DL (ref 0.2–1)
BUN SERPL-MCNC: 12 MG/DL (ref 5–25)
CALCIUM ALBUM COR SERPL-MCNC: 10 MG/DL (ref 8.3–10.1)
CALCIUM SERPL-MCNC: 8.8 MG/DL (ref 8.3–10.1)
CHLORIDE SERPL-SCNC: 104 MMOL/L (ref 100–108)
CO2 SERPL-SCNC: 28 MMOL/L (ref 21–32)
CREAT SERPL-MCNC: 0.96 MG/DL (ref 0.6–1.3)
EOSINOPHIL # BLD AUTO: 0.21 THOUSAND/ΜL (ref 0–0.61)
EOSINOPHIL NFR BLD AUTO: 3 % (ref 0–6)
ERYTHROCYTE [DISTWIDTH] IN BLOOD BY AUTOMATED COUNT: 14 % (ref 11.6–15.1)
GFR SERPL CREATININE-BSD FRML MDRD: 58 ML/MIN/1.73SQ M
GLUCOSE P FAST SERPL-MCNC: 99 MG/DL (ref 65–99)
HCT VFR BLD AUTO: 29.6 % (ref 34.8–46.1)
HGB BLD-MCNC: 9.2 G/DL (ref 11.5–15.4)
IMM GRANULOCYTES # BLD AUTO: 0.03 THOUSAND/UL (ref 0–0.2)
IMM GRANULOCYTES NFR BLD AUTO: 0 % (ref 0–2)
LYMPHOCYTES # BLD AUTO: 1.78 THOUSANDS/ΜL (ref 0.6–4.47)
LYMPHOCYTES NFR BLD AUTO: 26 % (ref 14–44)
MCH RBC QN AUTO: 29.2 PG (ref 26.8–34.3)
MCHC RBC AUTO-ENTMCNC: 31.1 G/DL (ref 31.4–37.4)
MCV RBC AUTO: 94 FL (ref 82–98)
MONOCYTES # BLD AUTO: 0.53 THOUSAND/ΜL (ref 0.17–1.22)
MONOCYTES NFR BLD AUTO: 8 % (ref 4–12)
NEUTROPHILS # BLD AUTO: 4.14 THOUSANDS/ΜL (ref 1.85–7.62)
NEUTS SEG NFR BLD AUTO: 62 % (ref 43–75)
NRBC BLD AUTO-RTO: 0 /100 WBCS
PLATELET # BLD AUTO: 577 THOUSANDS/UL (ref 149–390)
PMV BLD AUTO: 9.5 FL (ref 8.9–12.7)
POTASSIUM SERPL-SCNC: 5 MMOL/L (ref 3.5–5.3)
PROT SERPL-MCNC: 7.7 G/DL (ref 6.4–8.2)
RBC # BLD AUTO: 3.15 MILLION/UL (ref 3.81–5.12)
SODIUM SERPL-SCNC: 140 MMOL/L (ref 136–145)
WBC # BLD AUTO: 6.73 THOUSAND/UL (ref 4.31–10.16)

## 2021-02-17 PROCEDURE — 85025 COMPLETE CBC W/AUTO DIFF WBC: CPT

## 2021-02-17 PROCEDURE — 80053 COMPREHEN METABOLIC PANEL: CPT

## 2021-02-17 PROCEDURE — 36415 COLL VENOUS BLD VENIPUNCTURE: CPT

## 2021-02-18 ENCOUNTER — TELEPHONE (OUTPATIENT)
Dept: GASTROENTEROLOGY | Facility: CLINIC | Age: 76
End: 2021-02-18

## 2021-02-18 DIAGNOSIS — K57.90 DIVERTICULOSIS: Primary | ICD-10-CM

## 2021-02-18 NOTE — TELEPHONE ENCOUNTER
Spoke with patient's daughter advised per Pat's previous  note and lab work to be repeated in 2-3 weeks

## 2021-02-18 NOTE — TELEPHONE ENCOUNTER
----- Message from Kingsley Hamm PA-C sent at 2/18/2021  8:36 AM EST -----  Please let Lizeth Gonzalez know that her bloodwork shows improvement in her liver numbers, most of them are normal, only one is still a little elevated  This is likely a medication reaction like I talked with her about so tell her not to worry  I would recommend she get repeat bloodwork in 2-3 weeks to recheck the liver numbers again, please order a CMP

## 2021-02-19 ENCOUNTER — TRANSCRIBE ORDERS (OUTPATIENT)
Dept: ADMINISTRATIVE | Facility: HOSPITAL | Age: 76
End: 2021-02-19

## 2021-02-19 ENCOUNTER — TELEPHONE (OUTPATIENT)
Dept: INTERNAL MEDICINE CLINIC | Facility: CLINIC | Age: 76
End: 2021-02-19

## 2021-02-19 ENCOUNTER — TELEMEDICINE (OUTPATIENT)
Dept: INTERNAL MEDICINE CLINIC | Facility: CLINIC | Age: 76
End: 2021-02-19
Payer: MEDICARE

## 2021-02-19 VITALS
SYSTOLIC BLOOD PRESSURE: 167 MMHG | BODY MASS INDEX: 26.93 KG/M2 | DIASTOLIC BLOOD PRESSURE: 104 MMHG | TEMPERATURE: 98.8 F | HEART RATE: 86 BPM | WEIGHT: 152 LBS

## 2021-02-19 DIAGNOSIS — I71.4 ABDOMINAL AORTIC ANEURYSM WITHOUT RUPTURE (HCC): Primary | ICD-10-CM

## 2021-02-19 DIAGNOSIS — D62 ANEMIA DUE TO ACUTE BLOOD LOSS: ICD-10-CM

## 2021-02-19 DIAGNOSIS — N17.9 SEPSIS WITH ACUTE RENAL FAILURE, DUE TO UNSPECIFIED ORGANISM, UNSPECIFIED ACUTE RENAL FAILURE TYPE, UNSPECIFIED WHETHER SEPTIC SHOCK PRESENT (HCC): ICD-10-CM

## 2021-02-19 DIAGNOSIS — I10 BENIGN HYPERTENSION: Primary | ICD-10-CM

## 2021-02-19 DIAGNOSIS — N39.0 URINARY TRACT INFECTION WITHOUT HEMATURIA, SITE UNSPECIFIED: ICD-10-CM

## 2021-02-19 DIAGNOSIS — I10 BENIGN HYPERTENSION: ICD-10-CM

## 2021-02-19 DIAGNOSIS — N20.0 NEPHROLITHIASIS: ICD-10-CM

## 2021-02-19 DIAGNOSIS — R65.20 SEPSIS WITH ACUTE RENAL FAILURE, DUE TO UNSPECIFIED ORGANISM, UNSPECIFIED ACUTE RENAL FAILURE TYPE, UNSPECIFIED WHETHER SEPTIC SHOCK PRESENT (HCC): ICD-10-CM

## 2021-02-19 DIAGNOSIS — A41.9 SEPSIS WITH ACUTE RENAL FAILURE, DUE TO UNSPECIFIED ORGANISM, UNSPECIFIED ACUTE RENAL FAILURE TYPE, UNSPECIFIED WHETHER SEPTIC SHOCK PRESENT (HCC): ICD-10-CM

## 2021-02-19 DIAGNOSIS — R94.31 NONSPECIFIC ABNORMAL ELECTROCARDIOGRAM (ECG) (EKG): ICD-10-CM

## 2021-02-19 DIAGNOSIS — R74.01 TRANSAMINITIS: ICD-10-CM

## 2021-02-19 PROCEDURE — 99495 TRANSJ CARE MGMT MOD F2F 14D: CPT | Performed by: PHYSICIAN ASSISTANT

## 2021-02-19 RX ORDER — AMLODIPINE BESYLATE 5 MG/1
5 TABLET ORAL DAILY
Qty: 30 TABLET | Refills: 5 | Status: SHIPPED | OUTPATIENT
Start: 2021-02-19 | End: 2021-08-16

## 2021-02-19 RX ORDER — AMLODIPINE BESYLATE 2.5 MG/1
2.5 TABLET ORAL DAILY
Qty: 30 TABLET | Refills: 5 | Status: SHIPPED | OUTPATIENT
Start: 2021-02-19 | End: 2021-02-19

## 2021-02-19 NOTE — TELEPHONE ENCOUNTER
I changed the Rx to amlodipine 5 mg daily  Please let daughter and patient know  Thanks for getting back to me

## 2021-02-19 NOTE — TELEPHONE ENCOUNTER
Daughter called in regards to virtual appt    Cardiologist would like her raised to 5 mg a day of amolodipine  Dr Theo Calle  In [place]      Can we do this for 5 mg and have guanaco send it in accordingly?      Daughter  Called from  [facility]/Cuong Mixon

## 2021-02-19 NOTE — PROGRESS NOTES
Assessment/Plan:        Problem List Items Addressed This Visit        Cardiovascular and Mediastinum    Benign hypertension - Primary       Genitourinary    Nephrolithiasis       Other    Anemia    Transaminitis      Other Visit Diagnoses     Sepsis with acute renal failure, due to unspecified organism, unspecified acute renal failure type, unspecified whether septic shock present [facility])        Urinary tract infection without hematuria, site unspecified                 Reason for visit is Spit a follow-up/transition of care  Encounter provider Kacey Long PA-C       Provider located at [address] [place] [address]  [facility]  [address] [facility] [id number] [phone]      Recent Visits  No visits were found meeting these conditions  Showing recent visits within past 7 days and meeting all other requirements     Today's Visits  Date Type Provider Dept   02/19/21 Telemedicine Kacey Long PA-C [facility] [address] today's visits and meeting all other requirements     Future Appointments  No visits were found meeting these conditions  Showing future appointments within next 150 days and meeting all other requirements        After connecting through [email or web address], the patient was identified by name and date of birth  Jones Garner was informed that this is a telemedicine visit and that the visit is being conducted through [address] and patient was informed that this is not a secure, HIPAA-compliant platform  She agrees to proceed     My office door was closed  No one else was in the room  She acknowledged consent and understanding of privacy and security of the video platform  The patient has agreed to participate and understands they can discontinue the visit at any time  Patient is aware this is a billable service  Subjective:     Patient ID: Jones Garner is a 76 y o  female        Hospital follow-up/ transition of care 57-year-old female was hospitalized at [facility] from 02/05/2021 through 02/10/2021 at that time for several problems initially presenting the day after undergoing cystoscopy with right ureteral stent placement to the ER with evidence of urinary tract infection and sepsis with hypotension, leukocytosis and UA consistent with UTI  She was treated with antibiotics with improvement  During the hospitalization she developed an acute GI bleed for which a colonoscopy was done without definite finding of the bleed however suspected it being secondary to a diverticular versus AVM condition  She did require transfusion with 2 units of blood  Her hemoglobin stabilized  She also was found to have a an acute transaminitis for which her cholesterol-lowering medication of rosuvastatin was held  Her blood pressure was controlled and her Benicar continued  She however did show evidence of acute kidney injury which improved with hydration  Follow-up blood work showed improvement in her liver enzymes with remaining elevation in her alkaline phosphatase  She will repeat these enzymes in 2 weeks  She continues to hold her Crestor  Blood pressure however remains elevated despite taking her Benicar on a regular basis  We did discuss adding low-dose amlodipine at 2 5 mg daily and she is in agreement  She has started following with a cardiologist in [place] and will check this advice with him  Labs show improvement in her H&H, she has not noted any further blood in her stools, or black stools  She continues to feel weak  Her daughter who is present with her helping with her rehab has reviewed what has been going on and states the patient is quite  Sedentary at home  She was encouraged to be more active  Hospital records have been reviewed  Follow-up appointments were reviewed with patient      TCM Call (since 1/19/2021)     Date and time call was made  2/11/2021  2:00 PM    Hospital care reviewed  Records reviewed    Patient was hospitialized at  [facility]        Date of Admission  02/05/21    Date of discharge  02/10/21    Diagnosis  Severe sepsis-Rectal bleeding    Disposition  Home    Were the patients medications reviewed and updated  Yes    Current Symptoms  None      TCM Call (since 1/19/2021)     Post hospital issues  None    Should patient be enrolled in anticoag monitoring? No    Scheduled for follow up? Yes    Did you obtain your prescribed medications  Yes    Do you need help managing your prescriptions or medications  No    Is transportation to your appointment needed  No    I have advised the patient to call PCP with any new or worsening symptoms    [address]  None    Are you recieving any outpatient services  No    Are you recieving home care services  No    Are you using any community resources  No    Current waiver services  No    Have you fallen in the last 12 months  No    Interperter language line needed  No    Counseling  Patient            Review of Systems   Constitutional: Positive for fatigue  Negative for activity change, chills and fever  HENT: Negative for congestion  Eyes: Negative for discharge  Respiratory: Negative for cough, chest tightness and shortness of breath  Cardiovascular: Negative for chest pain, palpitations and leg swelling  Gastrointestinal: Negative for abdominal pain, blood in stool, constipation, diarrhea, nausea and vomiting  Endocrine: Negative for polydipsia, polyphagia and polyuria  Genitourinary: Negative for difficulty urinating  Musculoskeletal: Negative for arthralgias and myalgias  Skin: Negative for rash  Allergic/Immunologic: Negative for immunocompromised state  Neurological: Positive for weakness  Negative for dizziness, syncope, light-headedness and headaches  Hematological: Negative for adenopathy  Does not bruise/bleed easily  Psychiatric/Behavioral: Negative for dysphoric mood and sleep disturbance  The patient is not nervous/anxious  Objective:    Vitals:    02/19/21 1233   BP: (!) 167/104   BP Location: Right arm   Patient Position: Sitting   Cuff Size: Standard   Pulse: 86   Temp: 98 8 °F (37 1 °C)   TempSrc: Temporal   Weight: 68 9 kg (152 lb)       Physical Exam  Vitals signs and nursing note reviewed  Constitutional:       General: She is not in acute distress  Appearance: She is well-developed  Comments: Pleasant, conversant, no shortness of breath or respiratory distress  HENT:      Head: Normocephalic  Pulmonary:      Effort: Pulmonary effort is normal  No respiratory distress  Neurological:      General: No focal deficit present  Mental Status: She is alert and oriented to person, place, and time  Psychiatric:         Mood and Affect: Mood normal          Behavior: Behavior normal              Transitional Care Management Review:  Vivienne Lemon is a 76 y o  female here for TCM follow up  During the TCM phone call patient stated:    TCM Call (since 1/19/2021)     Date and time call was made  2/11/2021  2:00 PM    Hospital care reviewed  Records reviewed    Patient was hospitialized at  [facility]        Date of Admission  02/05/21    Date of discharge  02/10/21    Diagnosis  Severe sepsis-Rectal bleeding    Disposition  Home    Were the patients medications reviewed and updated  Yes    Current Symptoms  None      TCM Call (since 1/19/2021)     Post hospital issues  None    Should patient be enrolled in anticoag monitoring? No    Scheduled for follow up?   Yes    Did you obtain your prescribed medications  Yes    Do you need help managing your prescriptions or medications  No    Is transportation to your appointment needed  No    I have advised the patient to call PCP with any new or worsening symptoms  [address] None    Are you recieving any outpatient services  No    Are you recieving home care services  No    Are you using any community resources  No    Current waiver services  No    Have you fallen in the last 12 months  No    Interperter language line needed  No    Counseling  Patient          I spent 15 minutes with the patient during this visit      Lashay Dinh PA-C

## 2021-02-22 ENCOUNTER — TELEPHONE (OUTPATIENT)
Dept: UROLOGY | Facility: CLINIC | Age: 76
End: 2021-02-22

## 2021-02-22 DIAGNOSIS — N20.0 NEPHROLITHIASIS: Primary | ICD-10-CM

## 2021-02-22 RX ORDER — LEVOFLOXACIN 250 MG/1
250 TABLET ORAL EVERY 24 HOURS
Qty: 3 TABLET | Refills: 0 | Status: SHIPPED | OUTPATIENT
Start: 2021-02-22 | End: 2021-03-03 | Stop reason: SDUPTHER

## 2021-02-22 NOTE — TELEPHONE ENCOUNTER
Please let the patient know I have sent a prescription for antibiotics to her pharmacy  This is a 3 day course of Levaquin    She should start 1 day prior to her scheduled cystoscopy for stent removal   Thank you

## 2021-02-26 ENCOUNTER — TELEPHONE (OUTPATIENT)
Dept: INTERNAL MEDICINE CLINIC | Facility: CLINIC | Age: 76
End: 2021-02-26

## 2021-02-26 DIAGNOSIS — R74.01 TRANSAMINITIS: Primary | ICD-10-CM

## 2021-02-26 LAB — BACTERIA UR CULT: ABNORMAL

## 2021-02-26 NOTE — TELEPHONE ENCOUNTER
Patient was to call and give you BP readings- been runnin//81 and this am it was 111/72  Patient would like to know if you want her to start her Cholesterol medication again? Please advise  Parker Nolen

## 2021-02-26 NOTE — TELEPHONE ENCOUNTER
Blood pressure looks great  We will look to start the cholesterol medicine again after your seen by GI later in March  Would like to check your liver functions again prior to starting  I did put a lab test into the EMR

## 2021-03-02 PROCEDURE — 52310 CYSTOSCOPY AND TREATMENT: CPT | Performed by: STUDENT IN AN ORGANIZED HEALTH CARE EDUCATION/TRAINING PROGRAM

## 2021-03-02 NOTE — PROGRESS NOTES
Cystoscopy     Date/Time 3/2/2021 4:59 PM     Performed by  Maria Isabel Jackson MD     Authorized by Maria Isabel Jackson MD      Universal Protocol:  Consent: Verbal consent not obtained  Written consent obtained  Risks and benefits: risks, benefits and alternatives were discussed  Consent given by: patient  Time out: Immediately prior to procedure a "time out" was called to verify the correct patient, procedure, equipment, support staff and site/side marked as required  Patient understanding: patient states understanding of the procedure being performed  Patient consent: the patient's understanding of the procedure matches consent given  Procedure consent: procedure consent matches procedure scheduled  Relevant documents: relevant documents present and verified  Test results: test results available and properly labeled  Site marked: the operative site was marked  Patient identity confirmed: verbally with patient        Procedure Details:  Procedure type: complex removal of a foreign body, stone, or stent    Patient tolerance: Patient tolerated the procedure well with no immediate complications    Additional Procedure Details: Office Cystoscopy Procedure Note    Indication:     Retained ureteral stent    R renal calculus s/p R PCNL 2/4/2021    Informed consent   The risks, benefits, complications, treatment options, and expected outcomes were discussed with the patient  The patient concurred with the proposed plan and provided informed consent  Anesthesia  Lidocaine jelly 2%    Antibiotic prophylaxis   None    Procedure  In the presence of a female nurse, the patient was placed in the supine frog-legged position, was prepped and draped in the usual manner using sterile technique, and 2% lidocaine jelly instilled into the urethra   Prior to this pelvic examination showed normal labia majora and minora, a normal vaginal introitus, normal quality vaginal mucosa, and showed no pelvic floor descensus and normal urethral hypermobility  A 17 F flexible cystoscope was then inserted into the urethra and the urethra and bladder carefully examined  The following findings were noted:    Findings:  Urethra:  Normal  Bladder:  Normal  Ureteral orifices:  Normal  Other findings:  None     Specimens: None                 Complications:    None; patient tolerated the procedure well           Disposition: To home after 30 minute observation             Condition: Stable    Plan: Follow up in 6 weeks with [facility] and renal ultrasound

## 2021-03-03 ENCOUNTER — PROCEDURE VISIT (OUTPATIENT)
Dept: UROLOGY | Facility: CLINIC | Age: 76
End: 2021-03-03
Payer: MEDICARE

## 2021-03-03 VITALS
SYSTOLIC BLOOD PRESSURE: 140 MMHG | BODY MASS INDEX: 26.15 KG/M2 | HEIGHT: 63 IN | WEIGHT: 147.6 LBS | HEART RATE: 110 BPM | DIASTOLIC BLOOD PRESSURE: 92 MMHG

## 2021-03-03 DIAGNOSIS — N20.0 NEPHROLITHIASIS: Primary | ICD-10-CM

## 2021-03-03 RX ORDER — LEVOFLOXACIN 250 MG/1
250 TABLET ORAL EVERY 24 HOURS
Qty: 1 TABLET | Refills: 0 | Status: SHIPPED | OUTPATIENT
Start: 2021-03-03 | End: 2021-03-04

## 2021-03-03 NOTE — TELEPHONE ENCOUNTER
Pt dropped last pill down drain she's requesting 1 called into [facility]

## 2021-03-03 NOTE — TELEPHONE ENCOUNTER
Called and spoke to patient  Patient was very irritated that we are not prescribing her the third pill  Patient stated that she ended up in the hospital numerous times for her kidney issues  Patient stated that she wants the third pill  Explained to patient that she has already had two days of coverage with the previous two pills  Patient stated ' then why do they prescribe three pills?' Patient stated that she is very nervous that she is going to develop an infection and is insisting that a third pill be sent to her pharmacy  Patient states that she wants to take that pill at 9:30 tomorrow morning  Please send to pharmacy

## 2021-03-03 NOTE — TELEPHONE ENCOUNTER
Patient had a Cysto-Stent removal today, 03/03/2021 with Dr Brandon Faust  Patient is calling stated that she dropped her last pill down the sink  Do you want to send patient another? Please advise

## 2021-03-12 ENCOUNTER — TELEPHONE (OUTPATIENT)
Dept: GASTROENTEROLOGY | Facility: CLINIC | Age: 76
End: 2021-03-12

## 2021-03-12 ENCOUNTER — APPOINTMENT (OUTPATIENT)
Dept: LAB | Facility: HOSPITAL | Age: 76
End: 2021-03-12
Payer: MEDICARE

## 2021-03-12 DIAGNOSIS — K57.90 DIVERTICULOSIS: ICD-10-CM

## 2021-03-12 LAB
ALBUMIN SERPL BCP-MCNC: 3.3 G/DL (ref 3.5–5)
ALP SERPL-CCNC: 116 U/L (ref 46–116)
ALT SERPL W P-5'-P-CCNC: 25 U/L (ref 12–78)
ANION GAP SERPL CALCULATED.3IONS-SCNC: 9 MMOL/L (ref 4–13)
AST SERPL W P-5'-P-CCNC: 15 U/L (ref 5–45)
BILIRUB SERPL-MCNC: 0.26 MG/DL (ref 0.2–1)
BUN SERPL-MCNC: 19 MG/DL (ref 5–25)
CALCIUM ALBUM COR SERPL-MCNC: 10 MG/DL (ref 8.3–10.1)
CALCIUM SERPL-MCNC: 9.4 MG/DL (ref 8.3–10.1)
CHLORIDE SERPL-SCNC: 105 MMOL/L (ref 100–108)
CO2 SERPL-SCNC: 26 MMOL/L (ref 21–32)
CREAT SERPL-MCNC: 1.11 MG/DL (ref 0.6–1.3)
GFR SERPL CREATININE-BSD FRML MDRD: 49 ML/MIN/1.73SQ M
GLUCOSE P FAST SERPL-MCNC: 121 MG/DL (ref 65–99)
POTASSIUM SERPL-SCNC: 4.6 MMOL/L (ref 3.5–5.3)
PROT SERPL-MCNC: 8.5 G/DL (ref 6.4–8.2)
SODIUM SERPL-SCNC: 140 MMOL/L (ref 136–145)

## 2021-03-12 PROCEDURE — 36415 COLL VENOUS BLD VENIPUNCTURE: CPT

## 2021-03-12 PROCEDURE — 80053 COMPREHEN METABOLIC PANEL: CPT

## 2021-03-12 NOTE — TELEPHONE ENCOUNTER
----- Message from Tee Raphael PA-C sent at 3/12/2021  3:07 PM EST -----  Please let Rodger Bettyrosio know that her liver numbers have completely normalized

## 2021-03-24 ENCOUNTER — OFFICE VISIT (OUTPATIENT)
Dept: INTERNAL MEDICINE CLINIC | Facility: CLINIC | Age: 76
End: 2021-03-24
Payer: MEDICARE

## 2021-03-24 ENCOUNTER — OFFICE VISIT (OUTPATIENT)
Dept: GASTROENTEROLOGY | Facility: CLINIC | Age: 76
End: 2021-03-24
Payer: MEDICARE

## 2021-03-24 ENCOUNTER — APPOINTMENT (OUTPATIENT)
Dept: LAB | Facility: HOSPITAL | Age: 76
End: 2021-03-24
Payer: MEDICARE

## 2021-03-24 VITALS
WEIGHT: 147 LBS | BODY MASS INDEX: 26.05 KG/M2 | HEIGHT: 63 IN | SYSTOLIC BLOOD PRESSURE: 120 MMHG | HEART RATE: 78 BPM | DIASTOLIC BLOOD PRESSURE: 68 MMHG

## 2021-03-24 VITALS
BODY MASS INDEX: 25.69 KG/M2 | OXYGEN SATURATION: 99 % | SYSTOLIC BLOOD PRESSURE: 120 MMHG | HEART RATE: 102 BPM | HEIGHT: 63 IN | RESPIRATION RATE: 16 BRPM | WEIGHT: 145 LBS | DIASTOLIC BLOOD PRESSURE: 70 MMHG | TEMPERATURE: 98.8 F

## 2021-03-24 DIAGNOSIS — R73.01 IMPAIRED FASTING GLUCOSE: ICD-10-CM

## 2021-03-24 DIAGNOSIS — K62.5 RECTAL BLEEDING: ICD-10-CM

## 2021-03-24 DIAGNOSIS — K62.5 RECTAL BLEEDING: Primary | ICD-10-CM

## 2021-03-24 DIAGNOSIS — N20.0 NEPHROLITHIASIS: ICD-10-CM

## 2021-03-24 DIAGNOSIS — I71.4 ABDOMINAL AORTIC ANEURYSM (AAA) 3.0 CM TO 5.0 CM IN DIAMETER IN FEMALE (HCC): Primary | ICD-10-CM

## 2021-03-24 DIAGNOSIS — E78.2 MIXED HYPERLIPIDEMIA: ICD-10-CM

## 2021-03-24 DIAGNOSIS — K57.90 DIVERTICULOSIS: ICD-10-CM

## 2021-03-24 DIAGNOSIS — I10 BENIGN HYPERTENSION: ICD-10-CM

## 2021-03-24 LAB
BASOPHILS # BLD AUTO: 0.04 THOUSANDS/ΜL (ref 0–0.1)
BASOPHILS NFR BLD AUTO: 1 % (ref 0–1)
EOSINOPHIL # BLD AUTO: 0.2 THOUSAND/ΜL (ref 0–0.61)
EOSINOPHIL NFR BLD AUTO: 3 % (ref 0–6)
ERYTHROCYTE [DISTWIDTH] IN BLOOD BY AUTOMATED COUNT: 14.9 % (ref 11.6–15.1)
FERRITIN SERPL-MCNC: 27 NG/ML (ref 8–388)
HCT VFR BLD AUTO: 33.5 % (ref 34.8–46.1)
HGB BLD-MCNC: 10.6 G/DL (ref 11.5–15.4)
IMM GRANULOCYTES # BLD AUTO: 0.02 THOUSAND/UL (ref 0–0.2)
IMM GRANULOCYTES NFR BLD AUTO: 0 % (ref 0–2)
IRON SATN MFR SERPL: 13 %
IRON SERPL-MCNC: 41 UG/DL (ref 50–170)
LYMPHOCYTES # BLD AUTO: 1.9 THOUSANDS/ΜL (ref 0.6–4.47)
LYMPHOCYTES NFR BLD AUTO: 29 % (ref 14–44)
MCH RBC QN AUTO: 29.4 PG (ref 26.8–34.3)
MCHC RBC AUTO-ENTMCNC: 31.6 G/DL (ref 31.4–37.4)
MCV RBC AUTO: 93 FL (ref 82–98)
MONOCYTES # BLD AUTO: 0.48 THOUSAND/ΜL (ref 0.17–1.22)
MONOCYTES NFR BLD AUTO: 7 % (ref 4–12)
NEUTROPHILS # BLD AUTO: 3.84 THOUSANDS/ΜL (ref 1.85–7.62)
NEUTS SEG NFR BLD AUTO: 60 % (ref 43–75)
NRBC BLD AUTO-RTO: 0 /100 WBCS
PLATELET # BLD AUTO: 367 THOUSANDS/UL (ref 149–390)
PMV BLD AUTO: 9 FL (ref 8.9–12.7)
RBC # BLD AUTO: 3.61 MILLION/UL (ref 3.81–5.12)
TIBC SERPL-MCNC: 322 UG/DL (ref 250–450)
WBC # BLD AUTO: 6.48 THOUSAND/UL (ref 4.31–10.16)

## 2021-03-24 PROCEDURE — 82728 ASSAY OF FERRITIN: CPT

## 2021-03-24 PROCEDURE — 83540 ASSAY OF IRON: CPT

## 2021-03-24 PROCEDURE — 83550 IRON BINDING TEST: CPT

## 2021-03-24 PROCEDURE — 99213 OFFICE O/P EST LOW 20 MIN: CPT | Performed by: PHYSICIAN ASSISTANT

## 2021-03-24 PROCEDURE — 99214 OFFICE O/P EST MOD 30 MIN: CPT | Performed by: PHYSICIAN ASSISTANT

## 2021-03-24 PROCEDURE — 36415 COLL VENOUS BLD VENIPUNCTURE: CPT

## 2021-03-24 PROCEDURE — 85025 COMPLETE CBC W/AUTO DIFF WBC: CPT

## 2021-03-24 RX ORDER — ROSUVASTATIN CALCIUM 5 MG/1
5 TABLET, COATED ORAL DAILY
Qty: 30 TABLET | Refills: 5 | Status: SHIPPED | OUTPATIENT
Start: 2021-03-24 | End: 2021-07-07

## 2021-03-24 NOTE — PROGRESS NOTES
Assessment/Plan:      Patient Instructions   Will re-initiate rosuvastatin at 5 mg daily  This for cholesterol control  Will schedule to recheck labs in 3 months with repeat visit at that time  No other medications at this time  Follow-up with specialists as scheduled  Follow-up here as needed  Labs to be done for that visit  Quality Measures: BMI Counseling: Body mass index is 25 69 kg/m²  Follow-up plan was not completed due to patient being in urgent or emergent medical situation  Return in about 3 months (around 6/24/2021) for Next scheduled follow up  Diagnoses and all orders for this visit:    Abdominal aortic aneurysm (AAA) 3 0 cm to 5 0 cm in diameter in female [facility])    Benign hypertension  -     Comprehensive metabolic panel; Future  -     CBC and differential; Future    Nephrolithiasis    Mixed hyperlipidemia  -     rosuvastatin (CRESTOR) 5 mg tablet; Take 1 tablet (5 mg total) by mouth daily  -     Lipid panel; Future    Impaired fasting glucose  -     Hemoglobin A1C; Future          Subjective:      Patient ID: Jenny Rivas is a 76 y o  female  Follow-up     Patient was just seen by GI prior to this visit  Note reviewed  Patient was sent for CBC which is showing improvement in her H&H  She has not noted any GI bleeding recently  She is remark Ng that her bowels have been regular ever since she started eating "Vikash" bread every morning  No further diverticular symptoms at this time  No nausea or vomiting or GERD symptoms  She is known to have hiatal hernia  Status post urologic procedures including stent insertions and removals for multiple kidney stones  Review of her imaging studies looks as if she has 1 remaining right lower pole stone  Denies any flank pain or gross hematuria  Hypertension:  Good control  Michelle cp, palp, sob, edema, HA, dizziness, diaphoresis, syncope, visual disturbance      Hyperlipidemia:  Her rosuvastatin has been on hold secondary to elevated liver enzymes during her recent procedures  Liver enzymes have come down nicely with only slight elevation of the alk-phos  Patient would like to restart her rosuvastatin  Noted to have an elevated blood sugar with her last labs  She has modified her diet  We will recheck with her next labs along with an A1c  Abdominal aortic aneurysm: Followed by Cardiology in [place]  No complaint of back pain or abdominal pain  CKD 3: Following with Nephrology, has upcoming appointment        ALLERGIES:  Allergies   Allergen Reactions    Other Other (See Comments)     "French food"- swollen tongue      Zosyn [Piperacillin Sod-Tazobactam So] Hives       CURRENT MEDICATIONS:    Current Outpatient Medications:     amLODIPine (NORVASC) 5 mg tablet, Take 1 tablet (5 mg total) by mouth daily, Disp: 30 tablet, Rfl: 5    Ascorbic Acid (VITAMIN C) 100 MG tablet, Take 1 tablet by mouth daily, Disp: , Rfl:     aspirin 81 mg chewable tablet, Chew 81 mg daily, Disp: , Rfl:     B Complex Vitamins (VITAMIN B COMPLEX) TABS, Take 1 tablet by mouth daily, Disp: , Rfl:     cholecalciferol (VITAMIN D3) 1,000 units tablet, Take 1 tablet by mouth daily 5000 mg, Disp: , Rfl:     CINNAMON PO, Take 1 capsule by mouth daily , Disp: , Rfl:     Coenzyme Q10 (CO Q 10) 10 MG CAPS, Take 1 capsule by mouth daily, Disp: , Rfl:     Lutein 40 MG CAPS, Take by mouth, Disp: , Rfl:     Multiple Vitamins-Minerals (EYE VITAMINS & MINERALS PO), Take by mouth, Disp: , Rfl:     Multiple Vitamins-Minerals (MULTIVITAMIN ADULT PO), Take 1 tablet by mouth daily, Disp: , Rfl:     olmesartan (BENICAR) 40 mg tablet, Take 1 tablet (40 mg total) by mouth daily, Disp: 90 tablet, Rfl: 1    Omega-3 Fatty Acids (FISH OIL PO), Take 7,000 mg by mouth daily , Disp: , Rfl:     PARoxetine (PAXIL) 10 mg tablet, Take 1 tablet (10 mg total) by mouth daily, Disp: 30 tablet, Rfl: 5    pilocarpine (SALAGEN) 5 mg tablet, Take 5 mg by mouth 4 (four) times a day, Disp: , Rfl:     potassium citrate (Urocit-K 10) 10 mEq, Take 1 tablet (10 mEq total) by mouth 2 (two) times a day, Disp: 60 tablet, Rfl: 5    tamsulosin (FLOMAX) 0 4 mg, TAKE 1 CAPSULE BY MOUTH EVERY DAY WITH DINNER, Disp: 15 capsule, Rfl: 0    rosuvastatin (CRESTOR) 5 mg tablet, Take 1 tablet (5 mg total) by mouth daily, Disp: 30 tablet, Rfl: 5    ACTIVE PROBLEM LIST:  Patient Active Problem List   Diagnosis    Nephrolithiasis    Allergic rhinitis    Benign hypertension    Diverticulosis    Hiatal hernia    Hyperlipidemia    Depression    Impaired fasting glucose    Diverticulitis    Left shoulder tendonitis    Carpal tunnel syndrome of left wrist    Abdominal aortic aneurysm (AAA) 3 0 cm to 5 0 cm in diameter in female [facility])    Abdominal aortic aneurysm (AAA) without rupture (HCC)    Sjogren's syndrome with keratoconjunctivitis sicca (HCC)    Anemia    Transaminitis       PAST MEDICAL HISTORY:  Past Medical History:   Diagnosis Date    Abdominal aortic aneurysm (AAA) (HCC)     Hyperlipidemia     Hypertension     Kidney stone     Kidney stones     Pneumonia     Sjogren's syndrome ([address] )        PAST SURGICAL HISTORY:  Past Surgical History:   Procedure Laterality Date    CATARACT EXTRACTION      DILATION AND CURETTAGE OF UTERUS      FL RETROGRADE PYELOGRAM  12/7/2020    FL RETROGRADE PYELOGRAM  2/4/2021    IR NEPHROURETERAL ACCESS FOR UROLOGY PCNL  12/1/2020    OH CYSTO/URETERO W/LITHOTRIPSY &INDWELL STENT INSRT Right 2/4/2021    Procedure: CYSTOSCOPY URETEROSCOPY WITH LITHOTRIPSY HOLMIUM LASER, RETROGRADE PYELOGRAM AND INSERTION STENT URETERAL;  Surgeon: Jone Hinds MD;  [facility];  Service: Urology    OH CYSTO/URETERO/PYELOSCOPY, DX Right 2/15/2017    Procedure: URETEROSCOPY, STENT PLACEMENT ;  Surgeon: Jone Hinds MD;  [facility];  Service: Urology    OH CARLOS Rogers CM Right 2/15/2017    Procedure: ACCESS INTERPOLAR CALYX, INSERTED TWO WIRES INTO BLADDER, NEPHROLITHOTOMY  PERCUTANEOUS ;  Surgeon: Brad Villarreal MD;  [facility];  Service: Urology    GEORGE Rogers CM Right 2020    Procedure: NEPHROLITHOTOMY  PERCUTANEOUS (PCNL);   Surgeon: Brad Villarreal MD;  [facility];  Service: Urology    SHOULDER SURGERY         FAMILY HISTORY:  Family History   Problem Relation Age of Onset    Cancer Mother     Colon cancer Mother 80        CARCINOMA     Melanoma Mother     Alzheimer's disease Father     Urinary tract infection Sister     Other Sister 39        urinary tract cancer    Cancer Brother         BLADDER    Stroke Maternal Grandfather         CVA    No Known Problems Daughter     No Known Problems Maternal Grandmother     No Known Problems Paternal Grandmother     No Known Problems Sister     No Known Problems Sister     No Known Problems Daughter     No Known Problems Daughter     Lung cancer Maternal Aunt 79    No Known Problems Paternal Aunt     No Known Problems Paternal Aunt        SOCIAL HISTORY:  Social History     Socioeconomic History    Marital status: /Civil Union     Spouse name: Not on file    Number of children: 11    Years of education: Not on file    Highest education level: Not on file   Occupational History    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    Social Needs    Financial resource strain: Not on file    Food insecurity     Worry: Not on file     Inability: Not on file    Transportation needs     Medical: Not on file     Non-medical: Not on file   Tobacco Use    Smoking status: Former Smoker     Packs/day: 1 00     Years: 20 00     Pack years: 20 00     Quit date:      Years since quittin 2    Smokeless tobacco: Former User     Quit date:     Tobacco comment: smokes "very rarely"   Substance and Sexual Activity    Alcohol use: Yes     Frequency: Monthly or less     Drinks per session: 1 or 2    Drug use: No    Sexual activity: Never     Partners: Male   Lifestyle    Physical activity     Days per week: Not on file     Minutes per session: Not on file    Stress: Not on file   Relationships    Social connections     Talks on phone: Not on file     Gets together: Not on file     Attends Taoist service: Not on file     Active member of club or organization: Not on file     Attends meetings of clubs or organizations: Not on file     Relationship status: Not on file    Intimate partner violence     Fear of current or ex partner: Not on file     Emotionally abused: Not on file     Physically abused: Not on file     Forced sexual activity: Not on file   Other Topics Concern    Not on file   Social History Narrative    ACTIVITIES -- COMPUTERS, READING     [address]       Review of Systems   Constitutional: Positive for fatigue  Negative for activity change, chills and fever  HENT: Positive for hearing loss  Negative for congestion  Eyes: Negative for discharge and visual disturbance  Respiratory: Negative for cough, chest tightness and shortness of breath  Cardiovascular: Negative for chest pain, palpitations and leg swelling  Gastrointestinal: Negative for abdominal pain, blood in stool, constipation, diarrhea, nausea and vomiting  Endocrine: Negative for polydipsia, polyphagia and polyuria  Genitourinary: Negative for difficulty urinating  Musculoskeletal: Negative for arthralgias and myalgias  Skin: Negative for rash  Allergic/Immunologic: Negative for immunocompromised state  Neurological: Positive for weakness  Negative for dizziness, syncope, light-headedness and headaches  Hematological: Negative for adenopathy  Does not bruise/bleed easily  Psychiatric/Behavioral: Negative for dysphoric mood, sleep disturbance and suicidal ideas  The patient is not nervous/anxious  Objective:  Vitals:    03/24/21 1425   BP: 120/70   Pulse: 102   Resp: 16   Temp: 98 8 °F (37 1 °C)   SpO2: 99%   Weight: 65 8 kg (145 lb)   Height: 5' 3" (1 6 m)     Body mass index is 25 69 kg/m²  Physical Exam  Vitals signs and nursing note reviewed  Constitutional:       General: She is not in acute distress  Appearance: She is well-developed  She is not ill-appearing  Comments:   Tired appearing   HENT:      Head: Normocephalic and atraumatic  Eyes:      Pupils: Pupils are equal, round, and reactive to light  Neck:      Musculoskeletal: Neck supple  Thyroid: No thyromegaly  Vascular: No carotid bruit or JVD  Cardiovascular:      Rate and Rhythm: Normal rate and regular rhythm  Heart sounds: Murmur ( 2/6 systolic murmur best audible in the aortic area) present  Pulmonary:      Effort: Pulmonary effort is normal  No respiratory distress  Breath sounds: Normal breath sounds  Musculoskeletal:      Right lower leg: No edema  Left lower leg: No edema  Lymphadenopathy:      Cervical: No cervical adenopathy  Skin:     General: Skin is warm and dry  Findings: No rash  Neurological:      General: No focal deficit present  Mental Status: She is alert and oriented to person, place, and time  Mental status is at baseline     Psychiatric:         Mood and Affect: Mood normal          Behavior: Behavior normal            RESULTS:    Recent Results (from the past 1008 hour(s))   CBC and differential    Collection Time: 02/17/21  3:30 PM   Result Value Ref Range    WBC 6 73 4 31 - 10 16 Thousand/uL    RBC 3 15 (L) 3 81 - 5 12 Million/uL    Hemoglobin 9 2 (L) 11 5 - 15 4 g/dL    Hematocrit 29 6 (L) 34 8 - 46 1 %    MCV 94 82 - 98 fL    MCH 29 2 26 8 - 34 3 pg    MCHC 31 1 (L) 31 4 - 37 4 g/dL    RDW 14 0 11 6 - 15 1 %    MPV 9 5 8 9 - 12 7 fL    Platelets 883 (H) 270 - 390 Thousands/uL    nRBC 0 /100 WBCs    Neutrophils Relative 62 43 - 75 %    Immat GRANS % 0 0 - 2 %    Lymphocytes Relative 26 14 - 44 %    Monocytes Relative 8 4 - 12 %    Eosinophils Relative 3 0 - 6 %    Basophils Relative 1 0 - 1 %    Neutrophils Absolute 4 14 1 85 - 7 62 Thousands/µL    Immature Grans Absolute 0 03 0 00 - 0 20 Thousand/uL    Lymphocytes Absolute 1 78 0 60 - 4 47 Thousands/µL    Monocytes Absolute 0 53 0 17 - 1 22 Thousand/µL    Eosinophils Absolute 0 21 0 00 - 0 61 Thousand/µL    Basophils Absolute 0 04 0 00 - 0 10 Thousands/µL   Comprehensive metabolic panel    Collection Time: 02/17/21  3:30 PM   Result Value Ref Range    Sodium 140 136 - 145 mmol/L    Potassium 5 0 3 5 - 5 3 mmol/L    Chloride 104 100 - 108 mmol/L    CO2 28 21 - 32 mmol/L    ANION GAP 8 4 - 13 mmol/L    BUN 12 5 - 25 mg/dL    Creatinine 0 96 0 60 - 1 30 mg/dL    Glucose, Fasting 99 65 - 99 mg/dL    Calcium 8 8 8 3 - 10 1 mg/dL    Corrected Calcium 10 0 8 3 - 10 1 mg/dL    AST 20 5 - 45 U/L    ALT 44 12 - 78 U/L    Alkaline Phosphatase 180 (H) 46 - 116 U/L    Total Protein 7 7 6 4 - 8 2 g/dL    Albumin 2 5 (L) 3 5 - 5 0 g/dL    Total Bilirubin 0 30 0 20 - 1 00 mg/dL    eGFR 58 ml/min/1 73sq m   Comprehensive metabolic panel    Collection Time: 03/12/21  2:26 PM   Result Value Ref Range    Sodium 140 136 - 145 mmol/L    Potassium 4 6 3 5 - 5 3 mmol/L    Chloride 105 100 - 108 mmol/L    CO2 26 21 - 32 mmol/L    ANION GAP 9 4 - 13 mmol/L    BUN 19 5 - 25 mg/dL    Creatinine 1 11 0 60 - 1 30 mg/dL    Glucose, Fasting 121 (H) 65 - 99 mg/dL    Calcium 9 4 8 3 - 10 1 mg/dL    Corrected Calcium 10 0 8 3 - 10 1 mg/dL    AST 15 5 - 45 U/L    ALT 25 12 - 78 U/L    Alkaline Phosphatase 116 46 - 116 U/L    Total Protein 8 5 (H) 6 4 - 8 2 g/dL    Albumin 3 3 (L) 3 5 - 5 0 g/dL    Total Bilirubin 0 26 0 20 - 1 00 mg/dL    eGFR 49 ml/min/1 73sq m   CBC and differential    Collection Time: 03/24/21  1:45 PM   Result Value Ref Range    WBC 6 48 4 31 - 10 16 Thousand/uL    RBC 3 61 (L) 3 81 - 5 12 Million/uL    Hemoglobin 10 6 (L) 11 5 - 15 4 g/dL    Hematocrit 33 5 (L) 34 8 - 46 1 %    MCV 93 82 - 98 fL    MCH 29 4 26 8 - 34 3 pg    MCHC 31 6 31 4 - 37 4 g/dL    RDW 14 9 11 6 - 15 1 %    MPV 9 0 8 9 - 12 7 fL    Platelets 962 590 - 869 Thousands/uL    nRBC 0 /100 WBCs    Neutrophils Relative 60 43 - 75 %    Immat GRANS % 0 0 - 2 %    Lymphocytes Relative 29 14 - 44 %    Monocytes Relative 7 4 - 12 %    Eosinophils Relative 3 0 - 6 %    Basophils Relative 1 0 - 1 %    Neutrophils Absolute 3 84 1 85 - 7 62 Thousands/µL    Immature Grans Absolute 0 02 0 00 - 0 20 Thousand/uL    Lymphocytes Absolute 1 90 0 60 - 4 47 Thousands/µL    Monocytes Absolute 0 48 0 17 - 1 22 Thousand/µL    Eosinophils Absolute 0 20 0 00 - 0 61 Thousand/µL    Basophils Absolute 0 04 0 00 - 0 10 Thousands/µL       This note was created with voice recognition software  Phonic, grammatical and spelling errors may be present within the note as a result

## 2021-03-24 NOTE — PATIENT INSTRUCTIONS
Diverticulosis   WHAT YOU NEED TO KNOW:   Diverticulosis is a condition that causes small pockets called diverticula to form in your intestine  These pockets make it difficult for bowel movements to pass through your digestive system  DISCHARGE INSTRUCTIONS:   Return to the emergency department if:   · You have severe pain on the left side of your lower abdomen  · Your bowel movements are bright or dark red  Contact your healthcare provider if:   · You have a fever and chills  · You feel dizzy or lightheaded  · You have nausea, or you are vomiting  · You have a change in your bowel movements  · You have questions or concerns about your condition or care  Medicines:   · Medicines  to soften your bowel movements may be given  You may also need medicines to treat symptoms such as bloating and pain  · Take your medicine as directed  Contact your healthcare provider if you think your medicine is not helping or if you have side effects  Tell him or her if you are allergic to any medicine  Keep a list of the medicines, vitamins, and herbs you take  Include the amounts, and when and why you take them  Bring the list or the pill bottles to follow-up visits  Carry your medicine list with you in case of an emergency  Self-care: The goal of treatment is to manage any symptoms you have and prevent other problems such as diverticulitis  Diverticulitis is swelling or infection of the diverticula  Your healthcare provider may recommend any of the following:  · Eat a variety of high-fiber foods  High-fiber foods help you have regular bowel movements  High-fiber foods include cooked beans, fruits, vegetables, and some cereals  Most adults need 25 to 35 grams of fiber each day  Your healthcare provider may recommend that you have more  Ask your healthcare provider how much fiber you need  Increase fiber slowly   You may have abdominal discomfort, bloating, and gas if you add fiber to your diet too quickly  You may need to take a fiber supplement if you are not getting enough fiber from food  · Drink liquids as directed  You may need to drink 2 to 3 liters (8 to 12 cups) of liquids every day  Ask your healthcare provider how much liquid to drink each day and which liquids are best for you  · Apply heat  on your abdomen for 20 to 30 minutes every 2 hours for as many days as directed  Heat helps decrease pain and muscle spasms  Help prevent diverticulitis or other symptoms: The following may help decrease your risk for diverticulitis or symptoms, such as bleeding  Talk to your provider about these or other things you can do to prevent problems that may occur with diverticulosis  · Exercise regularly  Ask your healthcare provider about the best exercise plan for you  Exercise can help you have regular bowel movements  Get 30 minutes of exercise on most days of the week  · Maintain a healthy weight  Ask your healthcare provider how much you should weigh  Ask him or her to help you create a weight loss plan if you are overweight  · Do not smoke  Nicotine and other chemicals in cigarettes increase your risk for diverticulitis  Ask your healthcare provider for information if you currently smoke and need help to quit  E-cigarettes or smokeless tobacco still contain nicotine  Talk to your healthcare provider before you use these products  · Ask your healthcare provider if it is safe to take NSAIDs  NSAIDs may increase your risk of diverticulitis  Follow up with your healthcare provider as directed:  Write down your questions so you remember to ask them during your visits  © Copyright [address] Information is for End User's use only and may not be sold, redistributed or otherwise used for commercial purposes   All illustrations and images included in CareNotes® are the copyrighted property of [employer]  or [facility]   The above information is an  only  It is not intended as medical advice for individual conditions or treatments  Talk to your doctor, nurse or pharmacist before following any medical regimen to see if it is safe and effective for you

## 2021-03-24 NOTE — PATIENT INSTRUCTIONS
Will re-initiate rosuvastatin at 5 mg daily  This for cholesterol control  Will schedule to recheck labs in 3 months with repeat visit at that time  No other medications at this time  Follow-up with specialists as scheduled  Follow-up here as needed  Labs to be done for that visit

## 2021-03-24 NOTE — LETTER
March 24, 2021     [address]  [facility]    Patient: Velma Arana   YOB: 1945   Date of Visit: 3/24/2021       Dear Dr Darrian Donohue:    Thank you for referring Veronica Hernández to me for evaluation  Below are my notes for this consultation  If you have questions, please do not hesitate to call me  I look forward to following your patient along with you  Sincerely,        Shahram Sánchez PA-C        CC: No Recipients  Belle Donovan  3/24/2021 12:52 PM  Sign when Signing Visit  [facility] - Outpatient Follow-up Note  Velma Arana 76 y o  female MRN: [id number]  Encounter: [id number]          ASSESSMENT AND PLAN:      1  Rectal bleeding  2  Diverticulosis  -Will send patient for CBC as well as iron panel  Pending these results will determine whether iron therapy is started  -Continue high-fiber diet   -High-fiber diet given and reviewed  -Patient will need a repeat colonoscopy in 1 year as patient's colonoscopy that was just performed in February secondary to her diverticular bleed did not allow was to evaluate the lining of her colon well   -Patient will return to any nearest emergency department with return of bright red blood per rectum   ______________________________________________________________________    SUBJECTIVE:    51-year-old female who is here for hospital follow-up  Patient was admitted in February for diverticular bleed  Patient reports that for the most part she is feeling well except for upper extremity weakness and fatigue from time to time  She reports that the symptoms are not consistent but tend to come and go  She reports that her rectal bleeding has subsided  There has been no maroon stool or melenic stool  Patient has not had a recent CBC since February 17, 2021  Patient is not on iron supplementation at this time  Patient reports that she is eating well and her weight has stabilized  She denies any abdominal pain, nausea, vomiting  She reports that she is eating a high-fiber spread which in turn is making her have a bowel movement every day and she is very satisfied by this  REVIEW OF SYSTEMS IS OTHERWISE NEGATIVE  Historical Information   Past Medical History:   Diagnosis Date    Abdominal aortic aneurysm (AAA) ([facility] [address] [facility]     Hyperlipidemia     Hypertension     Kidney stone     Kidney stones     Pneumonia     Sjogren's syndrome ([facility] [address] )      Past Surgical History:   Procedure Laterality Date    CATARACT EXTRACTION      DILATION AND CURETTAGE OF UTERUS      FL RETROGRADE PYELOGRAM  12/7/2020    FL RETROGRADE PYELOGRAM  2/4/2021    IR NEPHROURETERAL ACCESS FOR UROLOGY PCNL  12/1/2020    PA CYSTO/URETERO W/LITHOTRIPSY &INDWELL STENT INSRT Right 2/4/2021    Procedure: CYSTOSCOPY URETEROSCOPY WITH LITHOTRIPSY HOLMIUM LASER, RETROGRADE PYELOGRAM AND INSERTION STENT URETERAL;  Surgeon: Tierra Mars MD;  [facility];  Service: Urology    PA CYSTO/URETERO/PYELOSCOPY, DX Right 2/15/2017    Procedure: URETEROSCOPY, STENT PLACEMENT ;  Surgeon: Tierra Mars MD;  [facility];  Service: Urology    PA PERCUT [place] CM Right 2/15/2017    Procedure: ACCESS INTERPOLAR CALYX, INSERTED TWO WIRES INTO BLADDER, NEPHROLITHOTOMY  PERCUTANEOUS ;  Surgeon: Tierra Mars MD;  [facility];  Service: Urology    PA PERCUT [place] CM Right 12/7/2020    Procedure: NEPHROLITHOTOMY  PERCUTANEOUS (PCNL);   Surgeon: Tierra Mars MD;  [facility];  Service: Urology   [facility]GERY       Social History   Social History     Substance and Sexual Activity   Alcohol Use Yes    Frequency: Monthly or less    Drinks per session: 1 or 2     Social History     Substance and Sexual Activity   Drug Use No     Social History     Tobacco Use   Smoking Status Former Smoker    Packs/day: 1 00    Years: 20 00    Pack years: 20 00    Quit date:     Years since quittin 2   Smokeless Tobacco Former User    Quit date:    Tobacco Comment    smokes "very rarely"     Family History   Problem Relation Age of Onset    Cancer Mother     Colon cancer Mother 80        CARCINOMA     Melanoma Mother     Alzheimer's disease Father     Urinary tract infection Sister     Other Sister 39        urinary tract cancer    Cancer Brother         BLADDER    Stroke Maternal Grandfather         CVA    No Known Problems Daughter     No Known Problems Maternal Grandmother     No Known Problems Paternal Grandmother     No Known Problems Sister     No Known Problems Sister     No Known Problems Daughter     No Known Problems Daughter     Lung cancer Maternal Aunt 79    No Known Problems Paternal Aunt     No Known Problems Paternal Aunt        Meds/Allergies       Current Outpatient Medications:     amLODIPine (NORVASC) 5 mg tablet    Ascorbic Acid (VITAMIN C) 100 MG tablet    aspirin 81 mg chewable tablet    B Complex Vitamins (VITAMIN B COMPLEX) TABS    cholecalciferol (VITAMIN D3) 1,000 units tablet    CINNAMON PO    Coenzyme Q10 (CO Q 10) 10 MG CAPS    Lutein 40 MG CAPS    Multiple Vitamins-Minerals (EYE VITAMINS & MINERALS PO)    Multiple Vitamins-Minerals (MULTIVITAMIN ADULT PO)    olmesartan (BENICAR) 40 mg tablet    Omega-3 Fatty Acids (FISH OIL PO)    PARoxetine (PAXIL) 10 mg tablet    pilocarpine (SALAGEN) 5 mg tablet    potassium citrate (Urocit-K 10) 10 mEq    tamsulosin (FLOMAX) 0 4 mg    Allergies   Allergen Reactions    Other Other (See Comments)     "[place]zerbaijani food"- swollen tongue      Zosyn [Piperacillin Sod-Tazobactam So] Hives           Objective     Blood pressure 120/68, pulse 78, height 5' 3" (1 6 m), weight 66 7 kg (147 lb), not currently breastfeeding  Body mass index is 26 04 kg/m²        PHYSICAL EXAM:      General Appearance:   Alert, cooperative, no distress   HEENT:   Normocephalic, atraumatic, anicteric      Neck:  Supple, symmetrical, trachea midline   Lungs:   Clear to auscultation bilaterally; no rales, rhonchi or wheezing; respirations unlabored    Heart[de-identified]   Regular rate and rhythm; no murmur, rub, or gallop  Abdomen:   Soft, non-tender, non-distended; normal bowel sounds; no masses, no organomegaly    Genitalia:   Deferred    Rectal:   Deferred    Extremities:  No cyanosis, clubbing or edema    Pulses:  2+ and symmetric    Skin:  No jaundice, rashes, or lesions    Lymph nodes:  No palpable cervical lymphadenopathy        Lab Results:   No visits with results within 1 Day(s) from this visit  Latest known visit with results is:   Appointment on 03/12/2021   Component Date Value    Sodium 03/12/2021 140     Potassium 03/12/2021 4 6     Chloride 03/12/2021 105     CO2 03/12/2021 26     ANION GAP 03/12/2021 9     BUN 03/12/2021 19     Creatinine 03/12/2021 1 11     Glucose, Fasting 03/12/2021 121*    Calcium 03/12/2021 9 4     Corrected Calcium 03/12/2021 10 0     AST 03/12/2021 15     ALT 03/12/2021 25     Alkaline Phosphatase 03/12/2021 116     Total Protein 03/12/2021 8 5*    Albumin 03/12/2021 3 3*    Total Bilirubin 03/12/2021 0 26     eGFR 03/12/2021 49          Radiology Results:   No results found

## 2021-03-24 NOTE — PROGRESS NOTES
[facility] - Outpatient Follow-up Note  Gabino Sandhoff 76 y o  female MRN: [id number]  Encounter: [id number]          ASSESSMENT AND PLAN:      1  Rectal bleeding  2  Diverticulosis  -Will send patient for CBC as well as iron panel  Pending these results will determine whether iron therapy is started  -Continue high-fiber diet   -High-fiber diet given and reviewed  -Patient will need a repeat colonoscopy in 1 year as patient's colonoscopy that was just performed in February secondary to her diverticular bleed did not allow was to evaluate the lining of her colon well   -Patient will return to any nearest emergency department with return of bright red blood per rectum   ______________________________________________________________________    SUBJECTIVE:    77-year-old female who is here for hospital follow-up  Patient was admitted in February for diverticular bleed  Patient reports that for the most part she is feeling well except for upper extremity weakness and fatigue from time to time  She reports that the symptoms are not consistent but tend to come and go  She reports that her rectal bleeding has subsided  There has been no maroon stool or melenic stool  Patient has not had a recent CBC since February 17, 2021  Patient is not on iron supplementation at this time  Patient reports that she is eating well and her weight has stabilized  She denies any abdominal pain, nausea, vomiting  She reports that she is eating a high-fiber spread which in turn is making her have a bowel movement every day and she is very satisfied by this  REVIEW OF SYSTEMS IS OTHERWISE NEGATIVE        Historical Information   Past Medical History:   Diagnosis Date    Abdominal aortic aneurysm (AAA) ([facility] [address]     Hyperlipidemia     Hypertension     Kidney stone     Kidney stones     Pneumonia     Sjogren's syndrome ([facility] [address] )      Past Surgical History:   Procedure Laterality Date    CATARACT EXTRACTION      DILATION AND CURETTAGE OF UTERUS      FL RETROGRADE PYELOGRAM  2020    FL RETROGRADE PYELOGRAM  2021    IR NEPHROURETERAL ACCESS FOR UROLOGY PCNL  2020    WA CYSTO/URETERO W/LITHOTRIPSY &INDWELL STENT INSRT Right 2021    Procedure: CYSTOSCOPY URETEROSCOPY WITH LITHOTRIPSY HOLMIUM LASER, RETROGRADE PYELOGRAM AND INSERTION STENT URETERAL;  Surgeon: Afia Hui MD;  [facility];  Service: Urology    WA CYSTO/URETERO/PYELOSCOPY, DX Right 2/15/2017    Procedure: URETEROSCOPY, STENT PLACEMENT ;  Surgeon: Afia Hui MD;  [facility];  Service: Urology    [place] PERCUT [place] CM Right 2/15/2017    Procedure: ACCESS INTERPOLAR CALYX, INSERTED TWO WIRES INTO BLADDER, NEPHROLITHOTOMY  PERCUTANEOUS ;  Surgeon: Afia Hui MD;  [facility];  Service: Urology    WA PERCUT [place] CM Right 2020    Procedure: NEPHROLITHOTOMY  PERCUTANEOUS (PCNL);   Surgeon: Afia Hui MD;  [facility];  Service: Urology   [facility] SURGERY       Social History   Social History     Substance and Sexual Activity   Alcohol Use Yes    Frequency: Monthly or less    Drinks per session: 1 or 2     Social History     Substance and Sexual Activity   Drug Use No     Social History     Tobacco Use   Smoking Status Former Smoker    Packs/day: 1 00    Years: 20 00    Pack years: 20 00    Quit date:     Years since quittin 2   Smokeless Tobacco Former User    Quit date:    Tobacco Comment    smokes "very rarely"     Family History   Problem Relation Age of Onset    Cancer Mother     Colon cancer Mother 80        CARCINOMA     Melanoma Mother     Alzheimer's disease Father     Urinary tract infection Sister     Other Sister 39        urinary tract cancer    Cancer Brother         BLADDER    Stroke Maternal Grandfather         CVA    No Known Problems Daughter     No Known Problems Maternal Grandmother     No Known Problems Paternal Grandmother     No Known Problems Sister     No Known Problems Sister     No Known Problems Daughter     No Known Problems Daughter     Lung cancer Maternal Aunt 79    No Known Problems Paternal Aunt     No Known Problems Paternal Aunt        Meds/Allergies       Current Outpatient Medications:     amLODIPine (NORVASC) 5 mg tablet    Ascorbic Acid (VITAMIN C) 100 MG tablet    aspirin 81 mg chewable tablet    B Complex Vitamins (VITAMIN B COMPLEX) TABS    cholecalciferol (VITAMIN D3) 1,000 units tablet    CINNAMON PO    Coenzyme Q10 (CO Q 10) 10 MG CAPS    Lutein 40 MG CAPS    Multiple Vitamins-Minerals (EYE VITAMINS & MINERALS PO)    Multiple Vitamins-Minerals (MULTIVITAMIN ADULT PO)    olmesartan (BENICAR) 40 mg tablet    Omega-3 Fatty Acids (FISH OIL PO)    PARoxetine (PAXIL) 10 mg tablet    pilocarpine (SALAGEN) 5 mg tablet    potassium citrate (Urocit-K 10) 10 mEq    tamsulosin (FLOMAX) 0 4 mg    Allergies   Allergen Reactions    Other Other (See Comments)     "[place]vadorean food"- swollen tongue      Zosyn [Piperacillin Sod-Tazobactam So] Hives           Objective     Blood pressure 120/68, pulse 78, height 5' 3" (1 6 m), weight 66 7 kg (147 lb), not currently breastfeeding  Body mass index is 26 04 kg/m²  PHYSICAL EXAM:      General Appearance:   Alert, cooperative, no distress   HEENT:   Normocephalic, atraumatic, anicteric      Neck:  Supple, symmetrical, trachea midline   Lungs:   Clear to auscultation bilaterally; no rales, rhonchi or wheezing; respirations unlabored    Heart[de-identified]   Regular rate and rhythm; no murmur, rub, or gallop     Abdomen:   Soft, non-tender, non-distended; normal bowel sounds; no masses, no organomegaly    Genitalia:   Deferred    Rectal:   Deferred    Extremities:  No cyanosis, clubbing or edema    Pulses:  2+ and symmetric    Skin:  No jaundice, rashes, or lesions    Lymph nodes:  No palpable cervical lymphadenopathy        Lab Results:   No visits with results within 1 Day(s) from this visit  Latest known visit with results is:   Appointment on 03/12/2021   Component Date Value    Sodium 03/12/2021 140     Potassium 03/12/2021 4 6     Chloride 03/12/2021 105     CO2 03/12/2021 26     ANION GAP 03/12/2021 9     BUN 03/12/2021 19     Creatinine 03/12/2021 1 11     Glucose, Fasting 03/12/2021 121*    Calcium 03/12/2021 9 4     Corrected Calcium 03/12/2021 10 0     AST 03/12/2021 15     ALT 03/12/2021 25     Alkaline Phosphatase 03/12/2021 116     Total Protein 03/12/2021 8 5*    Albumin 03/12/2021 3 3*    Total Bilirubin 03/12/2021 0 26     eGFR 03/12/2021 49          Radiology Results:   No results found

## 2021-03-25 ENCOUNTER — TELEPHONE (OUTPATIENT)
Dept: GASTROENTEROLOGY | Facility: CLINIC | Age: 76
End: 2021-03-25

## 2021-03-25 NOTE — TELEPHONE ENCOUNTER
----- Message from Glenda Rosa PA-C sent at 3/24/2021  3:39 PM EDT -----  Please inform patient that her hemoglobin is improving    Thank you

## 2021-03-27 DIAGNOSIS — I10 BENIGN HYPERTENSION: ICD-10-CM

## 2021-03-27 RX ORDER — OLMESARTAN MEDOXOMIL 40 MG/1
TABLET ORAL
Qty: 90 TABLET | Refills: 1 | Status: SHIPPED | OUTPATIENT
Start: 2021-03-27

## 2021-04-01 ENCOUNTER — IMMUNIZATIONS (OUTPATIENT)
Dept: FAMILY MEDICINE CLINIC | Facility: HOSPITAL | Age: 76
End: 2021-04-01

## 2021-04-01 DIAGNOSIS — Z23 ENCOUNTER FOR IMMUNIZATION: Primary | ICD-10-CM

## 2021-04-01 PROCEDURE — 91300 SARS-COV-2 / COVID-19 MRNA VACCINE (PFIZER-BIONTECH) 30 MCG: CPT

## 2021-04-01 PROCEDURE — 0001A SARS-COV-2 / COVID-19 MRNA VACCINE (PFIZER-BIONTECH) 30 MCG: CPT

## 2021-04-19 DIAGNOSIS — N20.0 NEPHROLITHIASIS: Primary | ICD-10-CM

## 2021-04-22 ENCOUNTER — APPOINTMENT (OUTPATIENT)
Dept: LAB | Facility: HOSPITAL | Age: 76
End: 2021-04-22
Attending: INTERNAL MEDICINE
Payer: MEDICARE

## 2021-04-22 DIAGNOSIS — N20.0 NEPHROLITHIASIS: ICD-10-CM

## 2021-04-22 LAB
ANION GAP SERPL CALCULATED.3IONS-SCNC: 8 MMOL/L (ref 4–13)
BUN SERPL-MCNC: 32 MG/DL (ref 5–25)
CALCIUM SERPL-MCNC: 8.9 MG/DL (ref 8.3–10.1)
CHLORIDE SERPL-SCNC: 102 MMOL/L (ref 100–108)
CO2 SERPL-SCNC: 27 MMOL/L (ref 21–32)
CREAT SERPL-MCNC: 1.24 MG/DL (ref 0.6–1.3)
GFR SERPL CREATININE-BSD FRML MDRD: 43 ML/MIN/1.73SQ M
GLUCOSE SERPL-MCNC: 105 MG/DL (ref 65–140)
POTASSIUM SERPL-SCNC: 4.8 MMOL/L (ref 3.5–5.3)
SODIUM SERPL-SCNC: 137 MMOL/L (ref 136–145)

## 2021-04-22 PROCEDURE — 80048 BASIC METABOLIC PNL TOTAL CA: CPT

## 2021-04-22 PROCEDURE — 36415 COLL VENOUS BLD VENIPUNCTURE: CPT

## 2021-04-26 ENCOUNTER — OFFICE VISIT (OUTPATIENT)
Dept: NEPHROLOGY | Facility: CLINIC | Age: 76
End: 2021-04-26
Payer: MEDICARE

## 2021-04-26 ENCOUNTER — IMMUNIZATIONS (OUTPATIENT)
Dept: FAMILY MEDICINE CLINIC | Facility: HOSPITAL | Age: 76
End: 2021-04-26

## 2021-04-26 VITALS
BODY MASS INDEX: 28.49 KG/M2 | HEART RATE: 80 BPM | SYSTOLIC BLOOD PRESSURE: 118 MMHG | WEIGHT: 160.8 LBS | DIASTOLIC BLOOD PRESSURE: 70 MMHG | HEIGHT: 63 IN

## 2021-04-26 DIAGNOSIS — N20.0 NEPHROLITHIASIS: Primary | ICD-10-CM

## 2021-04-26 DIAGNOSIS — Z23 ENCOUNTER FOR IMMUNIZATION: Primary | ICD-10-CM

## 2021-04-26 PROCEDURE — 99214 OFFICE O/P EST MOD 30 MIN: CPT | Performed by: INTERNAL MEDICINE

## 2021-04-26 PROCEDURE — 91300 SARS-COV-2 / COVID-19 MRNA VACCINE (PFIZER-BIONTECH) 30 MCG: CPT

## 2021-04-26 PROCEDURE — 0002A SARS-COV-2 / COVID-19 MRNA VACCINE (PFIZER-BIONTECH) 30 MCG: CPT

## 2021-04-26 NOTE — PROGRESS NOTES
NEPHROLOGY PROGRESS NOTE    Jay Jay Hines 76 y o  female MRN: [id number]  Unit/Bed#:  Encounter: [id number]  Reason for Consult:  Nephrolithiasis    Patient is here for routine follow-up she has had hospitalizations since I last saw her  She had the stent removed and then when she went home she developed chills and presented back to the emergency room and found to have urinary tract infection in low blood pressure  She was treated with antibiotics and then prior to her discharge he developed GI bleeding requiring transfusion and she told me they felt it might have been due to diverticulosis  She is now looking great feeling well today  ASSESSMENT/PLAN:  1  Nephrolithiasis    Patient's history of nephrolithiasis due to calcium oxalate stones  She had large stone requiring nephrolithotomy and that was performed and then recently had her ureteral stent removed as there was some ureteral stone present  When I reviewed her urine collection from the past she had low urinary citrate as her biggest risk factor in both urinary calcium and oxalate were acceptable  I placed on potassium citrate  I personally reviewed the CT scan from 02/05/2021 and there is an 8 mm stone present in the right lower pole  I did inform her of this  I am going to have her check a 24 hour urine collection to make sure the parameters that increased risk of calcium stone formation or in the normal range in will contact her with results  Continue potassium citrate at current dose  24 hour urine stone collection  Renal ultrasound to assess for stone disease  After I contact her will determine when follow-up will be but likely 6 months  SUBJECTIVE:  Review of Systems   Constitution: Negative for chills, diaphoresis, fever and malaise/fatigue  HENT: Negative  Eyes: Negative  Cardiovascular: Negative  Negative for chest pain, dyspnea on exertion, orthopnea and palpitations  Respiratory: Negative    Negative for cough, shortness of breath, sputum production and wheezing  Skin: Negative  Gastrointestinal: Negative  Negative for abdominal pain, diarrhea, nausea and vomiting  Genitourinary: Negative for dysuria, flank pain, hematuria and incomplete emptying  Neurological: Negative for dizziness, focal weakness, headaches and weakness  Psychiatric/Behavioral: Negative for altered mental status, depression, hallucinations and hypervigilance  OBJECTIVE:  Current Weight: Weight - Scale: 72 9 kg (160 lb 12 8 oz)  [email or web address]     Blood pressure 118/70, pulse 80, height 5' 3" (1 6 m), weight 72 9 kg (160 lb 12 8 oz), not currently breastfeeding  , Body mass index is 28 48 kg/m²  [unfilled]    Physical Exam: /70 (BP Location: Right arm, Patient Position: Sitting, Cuff Size: Standard)   Pulse 80   Ht 5' 3" (1 6 m)   Wt 72 9 kg (160 lb 12 8 oz)   BMI 28 48 kg/m²   Physical Exam  Constitutional:       General: She is not in acute distress  Appearance: Normal appearance  She is not ill-appearing or diaphoretic  HENT:      Head: Normocephalic and atraumatic  Nose:      Comments: Mask     Mouth/Throat:      Comments: Mask  Eyes:      General: No scleral icterus  Extraocular Movements: Extraocular movements intact  Neck:      Musculoskeletal: Normal range of motion and neck supple  Cardiovascular:      Rate and Rhythm: Normal rate and regular rhythm  Heart sounds: No friction rub  No gallop  Comments: No edema  Pulmonary:      Effort: Pulmonary effort is normal  No respiratory distress  Breath sounds: Normal breath sounds  No wheezing, rhonchi or rales  Abdominal:      General: Bowel sounds are normal  There is no distension  Palpations: Abdomen is soft  Tenderness: There is no abdominal tenderness  There is no rebound  Neurological:      General: No focal deficit present  Mental Status: She is alert and oriented to person, place, and time   Mental status is at baseline  Psychiatric:         Mood and Affect: Mood normal          Behavior: Behavior normal          Thought Content:  Thought content normal          Judgment: Judgment normal          Medications:    Current Outpatient Medications:     amLODIPine (NORVASC) 5 mg tablet, Take 1 tablet (5 mg total) by mouth daily, Disp: 30 tablet, Rfl: 5    aspirin 81 mg chewable tablet, Chew 81 mg daily, Disp: , Rfl:     Coenzyme Q10 (CO Q 10) 10 MG CAPS, Take 1 capsule by mouth daily, Disp: , Rfl:     Lutein 40 MG CAPS, Take by mouth, Disp: , Rfl:     Multiple Vitamins-Minerals (MULTIVITAMIN ADULT PO), Take 1 tablet by mouth daily, Disp: , Rfl:     olmesartan (BENICAR) 40 mg tablet, TAKE 1 TABLET BY MOUTH EVERY DAY, Disp: 90 tablet, Rfl: 1    PARoxetine (PAXIL) 10 mg tablet, Take 1 tablet (10 mg total) by mouth daily, Disp: 30 tablet, Rfl: 5    pilocarpine (SALAGEN) 5 mg tablet, Take 5 mg by mouth 4 (four) times a day, Disp: , Rfl:     potassium citrate (Urocit-K 10) 10 mEq, Take 1 tablet (10 mEq total) by mouth 2 (two) times a day, Disp: 60 tablet, Rfl: 5    rosuvastatin (CRESTOR) 5 mg tablet, Take 1 tablet (5 mg total) by mouth daily, Disp: 30 tablet, Rfl: 5    Ascorbic Acid (VITAMIN C) 100 MG tablet, Take 1 tablet by mouth daily, Disp: , Rfl:     B Complex Vitamins (VITAMIN B COMPLEX) TABS, Take 1 tablet by mouth daily, Disp: , Rfl:     cholecalciferol (VITAMIN D3) 1,000 units tablet, Take 1 tablet by mouth daily 5000 mg, Disp: , Rfl:     CINNAMON PO, Take 1 capsule by mouth daily , Disp: , Rfl:     Multiple Vitamins-Minerals (EYE VITAMINS & MINERALS PO), Take by mouth, Disp: , Rfl:     Omega-3 Fatty Acids (FISH OIL PO), Take 7,000 mg by mouth daily , Disp: , Rfl:     Laboratory Results:  Lab Results   Component Value Date    WBC 6 48 03/24/2021    HGB 10 6 (L) 03/24/2021    HCT 33 5 (L) 03/24/2021    MCV 93 03/24/2021     03/24/2021     Lab Results   Component Value Date    SODIUM 137 04/22/2021    K 4 8 04/22/2021     04/22/2021    CO2 27 04/22/2021    BUN 32 (H) 04/22/2021    CREATININE 1 24 04/22/2021    GLUC 105 04/22/2021    CALCIUM 8 9 04/22/2021     Lab Results   Component Value Date    CALCIUM 8 9 04/22/2021    PHOS 4 0 06/06/2017     No results found for: LABPROT

## 2021-04-26 NOTE — LETTER
April 26, 2021     Lizbeth Lucio, [address] [facility]    Patient: Mayi Hernandez   YOB: 1945   Date of Visit: 4/26/2021       Dear Dr Jerolyn Mcardle: Thank you for referring Alex Nicely to me for evaluation  Below are my notes for this consultation  If you have questions, please do not hesitate to call me  I look forward to following your patient along with you  Sincerely,        Roni Rodriguez MD        CC: No Recipients  Roni Rodriguez MD  4/26/2021  3:02 PM  Sign when Signing Visit  NEPHROLOGY PROGRESS NOTE    Mayi Hernandez 76 y o  female MRN: [id number]  Unit/Bed#:  Encounter: [id number]  Reason for Consult:  Nephrolithiasis    Patient is here for routine follow-up she has had hospitalizations since I last saw her  She had the stent removed and then when she went home she developed chills and presented back to the emergency room and found to have urinary tract infection in low blood pressure  She was treated with antibiotics and then prior to her discharge he developed GI bleeding requiring transfusion and she told me they felt it might have been due to diverticulosis  She is now looking great feeling well today  ASSESSMENT/PLAN:  1  Nephrolithiasis    Patient's history of nephrolithiasis due to calcium oxalate stones  She had large stone requiring nephrolithotomy and that was performed and then recently had her ureteral stent removed as there was some ureteral stone present  When I reviewed her urine collection from the past she had low urinary citrate as her biggest risk factor in both urinary calcium and oxalate were acceptable  I placed on potassium citrate  I personally reviewed the CT scan from 02/05/2021 and there is an 8 mm stone present in the right lower pole  I did inform her of this      I am going to have her check a 24 hour urine collection to make sure the parameters that increased risk of calcium stone formation or in the normal range in will contact her with results  Continue potassium citrate at current dose  hour urine stone collection  Renal ultrasound to assess for stone disease  After I contact her will determine when follow-up will be but likely 6 months  SUBJECTIVE:  Review of Systems   Constitution: Negative for chills, diaphoresis, fever and malaise/fatigue  HENT: Negative  Eyes: Negative  Cardiovascular: Negative  Negative for chest pain, dyspnea on exertion, orthopnea and palpitations  Respiratory: Negative  Negative for cough, shortness of breath, sputum production and wheezing  Skin: Negative  Gastrointestinal: Negative  Negative for abdominal pain, diarrhea, nausea and vomiting  Genitourinary: Negative for dysuria, flank pain, hematuria and incomplete emptying  Neurological: Negative for dizziness, focal weakness, headaches and weakness  Psychiatric/Behavioral: Negative for altered mental status, depression, hallucinations and hypervigilance  OBJECTIVE:  Current Weight: Weight - Scale: 72 9 kg (160 lb 12 8 oz)  [email or web address]:     Blood pressure 118/70, pulse 80, height 5' 3" (1 6 m), weight 72 9 kg (160 lb 12 8 oz), not currently breastfeeding  , Body mass index is 28 48 kg/m²  [unfilled]    Physical Exam: /70 (BP Location: Right arm, Patient Position: Sitting, Cuff Size: Standard)   Pulse 80   Ht 5' 3" (1 6 m)   Wt 72 9 kg (160 lb 12 8 oz)   BMI 28 48 kg/m²   Physical Exam  Constitutional:       General: She is not in acute distress  Appearance: Normal appearance  She is not ill-appearing or diaphoretic  HENT:      Head: Normocephalic and atraumatic  Nose:      Comments: Mask     Mouth/Throat:      Comments: Mask  Eyes:      General: No scleral icterus  Extraocular Movements: Extraocular movements intact  Neck:      Musculoskeletal: Normal range of motion and neck supple  Cardiovascular:      Rate and Rhythm: Normal rate and regular rhythm        Heart sounds: No friction rub  No gallop  Comments: No edema  Pulmonary:      Effort: Pulmonary effort is normal  No respiratory distress  Breath sounds: Normal breath sounds  No wheezing, rhonchi or rales  Abdominal:      General: Bowel sounds are normal  There is no distension  Palpations: Abdomen is soft  Tenderness: There is no abdominal tenderness  There is no rebound  Neurological:      General: No focal deficit present  Mental Status: She is alert and oriented to person, place, and time  Mental status is at baseline  Psychiatric:         Mood and Affect: Mood normal          Behavior: Behavior normal          Thought Content:  Thought content normal          Judgment: Judgment normal          Medications:    Current Outpatient Medications:     amLODIPine (NORVASC) 5 mg tablet, Take 1 tablet (5 mg total) by mouth daily, Disp: 30 tablet, Rfl: 5    aspirin 81 mg chewable tablet, Chew 81 mg daily, Disp: , Rfl:     Coenzyme Q10 (CO Q 10) 10 MG CAPS, Take 1 capsule by mouth daily, Disp: , Rfl:     Lutein 40 MG CAPS, Take by mouth, Disp: , Rfl:     Multiple Vitamins-Minerals (MULTIVITAMIN ADULT PO), Take 1 tablet by mouth daily, Disp: , Rfl:     olmesartan (BENICAR) 40 mg tablet, TAKE 1 TABLET BY MOUTH EVERY DAY, Disp: 90 tablet, Rfl: 1    PARoxetine (PAXIL) 10 mg tablet, Take 1 tablet (10 mg total) by mouth daily, Disp: 30 tablet, Rfl: 5    pilocarpine (SALAGEN) 5 mg tablet, Take 5 mg by mouth 4 (four) times a day, Disp: , Rfl:     potassium citrate (Urocit-K 10) 10 mEq, Take 1 tablet (10 mEq total) by mouth 2 (two) times a day, Disp: 60 tablet, Rfl: 5    rosuvastatin (CRESTOR) 5 mg tablet, Take 1 tablet (5 mg total) by mouth daily, Disp: 30 tablet, Rfl: 5    Ascorbic Acid (VITAMIN C) 100 MG tablet, Take 1 tablet by mouth daily, Disp: , Rfl:     B Complex Vitamins (VITAMIN B COMPLEX) TABS, Take 1 tablet by mouth daily, Disp: , Rfl:     cholecalciferol (VITAMIN D3) 1,000 units tablet, Take 1 tablet by mouth daily 5000 mg, Disp: , Rfl:     CINNAMON PO, Take 1 capsule by mouth daily , Disp: , Rfl:     Multiple Vitamins-Minerals (EYE VITAMINS & MINERALS PO), Take by mouth, Disp: , Rfl:     Omega-3 Fatty Acids (FISH OIL PO), Take 7,000 mg by mouth daily , Disp: , Rfl:     Laboratory Results:  Lab Results   Component Value Date    WBC 6 48 03/24/2021    HGB 10 6 (L) 03/24/2021    HCT 33 5 (L) 03/24/2021    MCV 93 03/24/2021     03/24/2021     Lab Results   Component Value Date    SODIUM 137 04/22/2021    K 4 8 04/22/2021     04/22/2021    CO2 27 04/22/2021    BUN 32 (H) 04/22/2021    CREATININE 1 24 04/22/2021    GLUC 105 04/22/2021    CALCIUM 8 9 04/22/2021     Lab Results   Component Value Date    CALCIUM 8 9 04/22/2021    PHOS 4 0 06/06/2017     No results found for: LABPROT

## 2021-04-26 NOTE — PATIENT INSTRUCTIONS
You are here for follow-up I am sorry to hear of all the difficulty you had since I last saw you with your hospitalizations  As it is now the stent is removed and you have been placed on potassium citrate  When I looked at your urine collection in the past citrate was low and that was your risk factor for forming calcium stones  At that time urine calcium and oxalate were normal   Continue the current medications and we will do a 24 hour urine to make sure all those parameters are in the appropriate ranges to help reduce risk of further stone formation  Obtain ultrasound at your convenience to just assess for the presence of any stones there that we will be able to follow over time      Do 24 hour urine collection and I will call you with the results

## 2021-05-03 ENCOUNTER — HOSPITAL ENCOUNTER (OUTPATIENT)
Dept: NON INVASIVE DIAGNOSTICS | Facility: CLINIC | Age: 76
Discharge: HOME | End: 2021-05-03
Payer: MEDICARE

## 2021-05-03 DIAGNOSIS — I71.4 ABDOMINAL AORTIC ANEURYSM (AAA) 3.0 CM TO 5.0 CM IN DIAMETER IN FEMALE (HCC): ICD-10-CM

## 2021-05-03 DIAGNOSIS — I71.4 ABDOMINAL AORTIC ANEURYSM (AAA) WITHOUT RUPTURE (HCC): ICD-10-CM

## 2021-05-03 PROCEDURE — 93978 VASCULAR STUDY: CPT | Performed by: SURGERY

## 2021-05-03 PROCEDURE — 93978 VASCULAR STUDY: CPT

## 2021-05-12 ENCOUNTER — HOSPITAL ENCOUNTER (OUTPATIENT)
Dept: NON INVASIVE DIAGNOSTICS | Facility: CLINIC | Age: 76
Discharge: HOME | End: 2021-05-12
Payer: MEDICARE

## 2021-05-12 DIAGNOSIS — I71.4 ABDOMINAL AORTIC ANEURYSM WITHOUT RUPTURE (HCC): ICD-10-CM

## 2021-05-12 DIAGNOSIS — R94.31 NONSPECIFIC ABNORMAL ELECTROCARDIOGRAM (ECG) (EKG): ICD-10-CM

## 2021-05-12 LAB
MAX DIASTOLIC BP: 78 MMHG
MAX HEART RATE: 104 BPM
MAX PREDICTED HEART RATE: 145 BPM
MAX. SYSTOLIC BP: 124 MMHG
PROTOCOL NAME: NORMAL
REASON FOR TERMINATION: NORMAL
TARGET HR FORMULA: NORMAL
TEST INDICATION: NORMAL
TIME IN EXERCISE PHASE: NORMAL

## 2021-05-12 PROCEDURE — 93306 TTE W/DOPPLER COMPLETE: CPT | Performed by: INTERNAL MEDICINE

## 2021-05-12 PROCEDURE — G1004 CDSM NDSC: HCPCS

## 2021-05-12 PROCEDURE — 78452 HT MUSCLE IMAGE SPECT MULT: CPT

## 2021-05-12 PROCEDURE — A9502 TC99M TETROFOSMIN: HCPCS

## 2021-05-12 PROCEDURE — 93306 TTE W/DOPPLER COMPLETE: CPT

## 2021-05-12 PROCEDURE — 93018 CV STRESS TEST I&R ONLY: CPT | Performed by: INTERNAL MEDICINE

## 2021-05-12 PROCEDURE — 93017 CV STRESS TEST TRACING ONLY: CPT

## 2021-05-12 PROCEDURE — 78452 HT MUSCLE IMAGE SPECT MULT: CPT | Performed by: INTERNAL MEDICINE

## 2021-05-12 PROCEDURE — 93016 CV STRESS TEST SUPVJ ONLY: CPT | Performed by: INTERNAL MEDICINE

## 2021-05-12 RX ORDER — AMINOPHYLLINE DIHYDRATE 25 MG/ML
50 INJECTION, SOLUTION INTRAVENOUS ONCE
Status: COMPLETED | OUTPATIENT
Start: 2021-05-12 | End: 2021-05-12

## 2021-05-12 RX ADMIN — AMINOPHYLLINE 50 MG: 25 INJECTION, SOLUTION INTRAVENOUS at 09:21

## 2021-05-12 RX ADMIN — REGADENOSON 0.4 MG: 0.08 INJECTION, SOLUTION INTRAVENOUS at 09:17

## 2021-06-10 ENCOUNTER — APPOINTMENT (OUTPATIENT)
Dept: LAB | Facility: HOSPITAL | Age: 76
End: 2021-06-10
Payer: MEDICARE

## 2021-06-10 ENCOUNTER — TRANSCRIBE ORDERS (OUTPATIENT)
Dept: ADMINISTRATIVE | Facility: HOSPITAL | Age: 76
End: 2021-06-10

## 2021-06-10 DIAGNOSIS — R73.01 IMPAIRED FASTING GLUCOSE: ICD-10-CM

## 2021-06-10 DIAGNOSIS — R74.01 TRANSAMINITIS: ICD-10-CM

## 2021-06-10 DIAGNOSIS — E78.2 MIXED HYPERLIPIDEMIA: ICD-10-CM

## 2021-06-10 DIAGNOSIS — H91.92 HEARING LOSS OF LEFT EAR, UNSPECIFIED HEARING LOSS TYPE: Primary | ICD-10-CM

## 2021-06-10 DIAGNOSIS — I10 BENIGN HYPERTENSION: ICD-10-CM

## 2021-06-10 DIAGNOSIS — H91.92 HEARING LOSS OF LEFT EAR, UNSPECIFIED HEARING LOSS TYPE: ICD-10-CM

## 2021-06-10 LAB
ALBUMIN SERPL BCP-MCNC: 3.1 G/DL (ref 3.5–5)
ALP SERPL-CCNC: 71 U/L (ref 46–116)
ALT SERPL W P-5'-P-CCNC: 21 U/L (ref 12–78)
ANION GAP SERPL CALCULATED.3IONS-SCNC: 8 MMOL/L (ref 4–13)
AST SERPL W P-5'-P-CCNC: 14 U/L (ref 5–45)
BASOPHILS # BLD AUTO: 0.05 THOUSANDS/ΜL (ref 0–0.1)
BASOPHILS NFR BLD AUTO: 1 % (ref 0–1)
BILIRUB SERPL-MCNC: 0.26 MG/DL (ref 0.2–1)
BUN SERPL-MCNC: 31 MG/DL (ref 5–25)
CALCIUM ALBUM COR SERPL-MCNC: 9.5 MG/DL (ref 8.3–10.1)
CALCIUM SERPL-MCNC: 8.8 MG/DL (ref 8.3–10.1)
CHLORIDE SERPL-SCNC: 103 MMOL/L (ref 100–108)
CHOLEST SERPL-MCNC: 211 MG/DL (ref 50–200)
CO2 SERPL-SCNC: 26 MMOL/L (ref 21–32)
CREAT SERPL-MCNC: 1.34 MG/DL (ref 0.6–1.3)
CRP SERPL QL: <3 MG/L
EOSINOPHIL # BLD AUTO: 0.18 THOUSAND/ΜL (ref 0–0.61)
EOSINOPHIL NFR BLD AUTO: 3 % (ref 0–6)
ERYTHROCYTE [DISTWIDTH] IN BLOOD BY AUTOMATED COUNT: 14 % (ref 11.6–15.1)
ERYTHROCYTE [SEDIMENTATION RATE] IN BLOOD: 34 MM/HOUR (ref 0–29)
EST. AVERAGE GLUCOSE BLD GHB EST-MCNC: 131 MG/DL
GFR SERPL CREATININE-BSD FRML MDRD: 39 ML/MIN/1.73SQ M
GLUCOSE P FAST SERPL-MCNC: 113 MG/DL (ref 65–99)
HBA1C MFR BLD: 6.2 %
HCT VFR BLD AUTO: 34.1 % (ref 34.8–46.1)
HDLC SERPL-MCNC: 98 MG/DL
HGB BLD-MCNC: 10.8 G/DL (ref 11.5–15.4)
IMM GRANULOCYTES # BLD AUTO: 0.01 THOUSAND/UL (ref 0–0.2)
IMM GRANULOCYTES NFR BLD AUTO: 0 % (ref 0–2)
LDLC SERPL CALC-MCNC: 102 MG/DL (ref 0–100)
LYMPHOCYTES # BLD AUTO: 1.47 THOUSANDS/ΜL (ref 0.6–4.47)
LYMPHOCYTES NFR BLD AUTO: 25 % (ref 14–44)
MCH RBC QN AUTO: 29.3 PG (ref 26.8–34.3)
MCHC RBC AUTO-ENTMCNC: 31.7 G/DL (ref 31.4–37.4)
MCV RBC AUTO: 93 FL (ref 82–98)
MONOCYTES # BLD AUTO: 0.44 THOUSAND/ΜL (ref 0.17–1.22)
MONOCYTES NFR BLD AUTO: 8 % (ref 4–12)
NEUTROPHILS # BLD AUTO: 3.75 THOUSANDS/ΜL (ref 1.85–7.62)
NEUTS SEG NFR BLD AUTO: 63 % (ref 43–75)
NONHDLC SERPL-MCNC: 113 MG/DL
NRBC BLD AUTO-RTO: 0 /100 WBCS
PLATELET # BLD AUTO: 321 THOUSANDS/UL (ref 149–390)
PMV BLD AUTO: 9.3 FL (ref 8.9–12.7)
POTASSIUM SERPL-SCNC: 4.8 MMOL/L (ref 3.5–5.3)
PROT SERPL-MCNC: 8.1 G/DL (ref 6.4–8.2)
RBC # BLD AUTO: 3.68 MILLION/UL (ref 3.81–5.12)
SODIUM SERPL-SCNC: 137 MMOL/L (ref 136–145)
TRIGL SERPL-MCNC: 56 MG/DL
WBC # BLD AUTO: 5.9 THOUSAND/UL (ref 4.31–10.16)

## 2021-06-10 PROCEDURE — 85025 COMPLETE CBC W/AUTO DIFF WBC: CPT

## 2021-06-10 PROCEDURE — 83036 HEMOGLOBIN GLYCOSYLATED A1C: CPT

## 2021-06-10 PROCEDURE — 36415 COLL VENOUS BLD VENIPUNCTURE: CPT

## 2021-06-10 PROCEDURE — 80061 LIPID PANEL: CPT

## 2021-06-10 PROCEDURE — 86140 C-REACTIVE PROTEIN: CPT

## 2021-06-10 PROCEDURE — 80053 COMPREHEN METABOLIC PANEL: CPT

## 2021-06-10 PROCEDURE — 85652 RBC SED RATE AUTOMATED: CPT

## 2021-06-10 PROCEDURE — 83883 ASSAY NEPHELOMETRY NOT SPEC: CPT

## 2021-06-11 ENCOUNTER — HOSPITAL ENCOUNTER (OUTPATIENT)
Dept: ULTRASOUND IMAGING | Facility: HOSPITAL | Age: 76
Discharge: HOME | End: 2021-06-11
Attending: INTERNAL MEDICINE
Payer: MEDICARE

## 2021-06-11 DIAGNOSIS — N20.0 NEPHROLITHIASIS: ICD-10-CM

## 2021-06-11 LAB
KAPPA LC FREE SER-MCNC: 78.6 MG/L (ref 3.3–19.4)
KAPPA LC FREE/LAMBDA FREE SER: 2.33 {RATIO} (ref 0.26–1.65)
LAMBDA LC FREE SERPL-MCNC: 33.8 MG/L (ref 5.7–26.3)

## 2021-06-11 PROCEDURE — 76770 US EXAM ABDO BACK WALL COMP: CPT

## 2021-06-17 DIAGNOSIS — F41.9 ANXIETY: ICD-10-CM

## 2021-06-17 RX ORDER — PAROXETINE 10 MG/1
TABLET, FILM COATED ORAL
Qty: 90 TABLET | Refills: 1 | Status: SHIPPED | OUTPATIENT
Start: 2021-06-17

## 2021-06-18 ENCOUNTER — APPOINTMENT (OUTPATIENT)
Dept: LAB | Facility: HOSPITAL | Age: 76
End: 2021-06-18
Attending: INTERNAL MEDICINE
Payer: MEDICARE

## 2021-06-18 ENCOUNTER — TELEPHONE (OUTPATIENT)
Dept: NEPHROLOGY | Facility: CLINIC | Age: 76
End: 2021-06-18

## 2021-06-18 DIAGNOSIS — I71.4 ABDOMINAL AORTIC ANEURYSM (AAA) 3.0 CM TO 5.0 CM IN DIAMETER IN FEMALE (HCC): Primary | ICD-10-CM

## 2021-06-18 PROCEDURE — 83935 ASSAY OF URINE OSMOLALITY: CPT | Performed by: INTERNAL MEDICINE

## 2021-06-18 PROCEDURE — 82570 ASSAY OF URINE CREATININE: CPT | Performed by: INTERNAL MEDICINE

## 2021-06-18 PROCEDURE — 81003 URINALYSIS AUTO W/O SCOPE: CPT | Performed by: INTERNAL MEDICINE

## 2021-06-18 PROCEDURE — 84392 ASSAY OF URINE SULFATE: CPT | Performed by: INTERNAL MEDICINE

## 2021-06-18 PROCEDURE — 84105 ASSAY OF URINE PHOSPHORUS: CPT | Performed by: INTERNAL MEDICINE

## 2021-06-18 PROCEDURE — 82340 ASSAY OF CALCIUM IN URINE: CPT | Performed by: INTERNAL MEDICINE

## 2021-06-18 PROCEDURE — 82131 AMINO ACIDS SINGLE QUANT: CPT | Performed by: INTERNAL MEDICINE

## 2021-06-18 PROCEDURE — 84300 ASSAY OF URINE SODIUM: CPT | Performed by: INTERNAL MEDICINE

## 2021-06-18 PROCEDURE — 83945 ASSAY OF OXALATE: CPT | Performed by: INTERNAL MEDICINE

## 2021-06-18 PROCEDURE — 83735 ASSAY OF MAGNESIUM: CPT | Performed by: INTERNAL MEDICINE

## 2021-06-18 PROCEDURE — 84560 ASSAY OF URINE/URIC ACID: CPT | Performed by: INTERNAL MEDICINE

## 2021-06-18 PROCEDURE — 82507 ASSAY OF CITRATE: CPT | Performed by: INTERNAL MEDICINE

## 2021-06-18 PROCEDURE — 84133 ASSAY OF URINE POTASSIUM: CPT | Performed by: INTERNAL MEDICINE

## 2021-06-18 PROCEDURE — 82436 ASSAY OF URINE CHLORIDE: CPT | Performed by: INTERNAL MEDICINE

## 2021-06-18 PROCEDURE — 82140 ASSAY OF AMMONIA: CPT | Performed by: INTERNAL MEDICINE

## 2021-06-18 NOTE — PROGRESS NOTES
Renal ultrasound for kidney stone evaluation did show that her abdominal aortic aneurysm had increased in size  I forwarded a copy of the ultrasound to Dr Jaleesa Hutchinson who is the vascular surgeon monitoring this  I will also contact him to let him know to look out for this

## 2021-06-30 ENCOUNTER — TELEPHONE (OUTPATIENT)
Dept: NEPHROLOGY | Facility: CLINIC | Age: 76
End: 2021-06-30

## 2021-06-30 LAB
AMMONIA 24H UR-MRATE: 3 MEQ/24 HR
AMMONIA UR-SCNC: 3250 UG/DL
CA H2 PHOS DIHYD CRY URNS QL MICRO: 1.32 RATIO (ref 0–3)
CALCIUM 24H UR-MCNC: 4.3 MG/DL
CALCIUM 24H UR-MRATE: 71 MG/24 HR (ref 100–300)
CHLORIDE 24H UR-SCNC: 33 MMOL/L
CHLORIDE 24H UR-SRATE: 54 MMOL/24 HR (ref 110–250)
CITRATE 24H UR-MCNC: 164 MG/L
CITRATE 24H UR-MRATE: 271 MG/24 HR (ref 320–1240)
COM CRY STONE QL IR: 6.24 RATIO (ref 0–6)
CREAT 24H UR-MCNC: 30.3 MG/DL
CREAT 24H UR-MRATE: 500 MG/24 HR (ref 800–1800)
CYSTINE 24H UR-MCNC: 2.88 MG/L
CYSTINE 24H UR-MRATE: 4.75 MG/24 HR (ref 10–100)
MAGNESIUM 24H UR-MRATE: 40 MG/24 HR (ref 12–293)
MAGNESIUM UR-MCNC: 2.4 MG/DL
NA URATE CRY STONE QL IR: 1.38 RATIO (ref 0–4)
OSMOLALITY UR: 242 MOSMOL/KG (ref 300–900)
OXALATE 24H UR-MRATE: 64 MG/24 HR (ref 4–31)
OXALATE UR-MCNC: 39 MG/L
PH 24H UR: 7 [PH]
PHOSPHATE 24H UR-MCNC: 37.9 MG/DL
PHOSPHATE 24H UR-MRATE: 625.4 MG/24 HR (ref 400–1300)
PLEASE NOTE (STONE RISK): ABNORMAL
POTASSIUM 24H UR-SCNC: 40.8 MMOL/24 HR (ref 25–125)
POTASSIUM UR-SCNC: 24.7 MMOL/L
PRESERVED URINE: 1650 ML/24 HR (ref 600–1600)
SODIUM 24H UR-SCNC: 54 MMOL/L
SODIUM 24H UR-SRATE: 89 MMOL/24 HR (ref 39–258)
SPECIMEN VOL 24H UR: 1650 ML/24 HR (ref 600–1600)
SULFATE 24H UR-MCNC: 12 MEQ/24 HR (ref 0–30)
SULFATE UR-MCNC: 7 MEQ/L
TRI-PHOS CRY STONE MICRO: 0.02 RATIO (ref 0–1)
URATE 24H UR-MCNC: 21.1 MG/DL
URATE 24H UR-MRATE: 348 MG/24 HR (ref 250–750)
URATE DIHYD CRY STONE QL IR: 0.1 RATIO (ref 0–1.2)

## 2021-06-30 NOTE — TELEPHONE ENCOUNTER
----- Message from Dheeraj Mcconnell MD sent at 6/30/2021  6:55 AM EDT -----  Terll her I saw recent urine  Would increase potassium citrate to one three times a day to increase this a littlemore  Also tell her oxalate high so she should take a magnesium tablet with meals, She cabn just buy magnesium over the counter, uncoated  Both these thiongs are to help reduce new stone formation  Put her on call back for October Thanks

## 2021-06-30 NOTE — TELEPHONE ENCOUNTER
Spoke with patient and made advised her based off of recent urine studies, patient is to increase potassium citrate to one tablet three times a day  Made aware oxalate level also high, advised to start on magnesium over-the-counter un coated  Patient is aware we will be contacting her shortly to schedule October follow up  Patient has verbalized understanding and has no questions at this time

## 2021-07-07 ENCOUNTER — OFFICE VISIT (OUTPATIENT)
Dept: INTERNAL MEDICINE CLINIC | Facility: CLINIC | Age: 76
End: 2021-07-07
Payer: MEDICARE

## 2021-07-07 VITALS
OXYGEN SATURATION: 99 % | BODY MASS INDEX: 26.75 KG/M2 | SYSTOLIC BLOOD PRESSURE: 98 MMHG | HEART RATE: 87 BPM | DIASTOLIC BLOOD PRESSURE: 60 MMHG | RESPIRATION RATE: 16 BRPM | TEMPERATURE: 98.6 F | HEIGHT: 63 IN | WEIGHT: 151 LBS

## 2021-07-07 DIAGNOSIS — E78.2 MIXED HYPERLIPIDEMIA: ICD-10-CM

## 2021-07-07 DIAGNOSIS — N18.32 STAGE 3B CHRONIC KIDNEY DISEASE (HCC): ICD-10-CM

## 2021-07-07 DIAGNOSIS — R77.8 ABNORMAL SERUM PROTEIN TEST: Primary | ICD-10-CM

## 2021-07-07 DIAGNOSIS — N20.0 NEPHROLITHIASIS: ICD-10-CM

## 2021-07-07 DIAGNOSIS — R73.01 IMPAIRED FASTING GLUCOSE: ICD-10-CM

## 2021-07-07 DIAGNOSIS — M35.01 SJOGREN'S SYNDROME WITH KERATOCONJUNCTIVITIS SICCA (HCC): ICD-10-CM

## 2021-07-07 DIAGNOSIS — I10 BENIGN HYPERTENSION: ICD-10-CM

## 2021-07-07 DIAGNOSIS — I71.4 ABDOMINAL AORTIC ANEURYSM (AAA) WITHOUT RUPTURE (HCC): ICD-10-CM

## 2021-07-07 PROCEDURE — 99214 OFFICE O/P EST MOD 30 MIN: CPT | Performed by: PHYSICIAN ASSISTANT

## 2021-07-07 RX ORDER — ROSUVASTATIN CALCIUM 5 MG/1
10 TABLET, COATED ORAL DAILY
Qty: 90 TABLET | Refills: 1
Start: 2021-07-07

## 2021-07-07 RX ORDER — POTASSIUM CITRATE 10 MEQ/1
10 TABLET, EXTENDED RELEASE ORAL 3 TIMES DAILY
Qty: 90 TABLET | Refills: 1
Start: 2021-07-07 | End: 2021-08-01

## 2021-07-07 NOTE — PROGRESS NOTES
Assessment/Plan:      Patient Instructions   Will refer patient to local Hematology for evaluation of abnormal proteins  No other changes at this time  Schedule follow-up here in mid September for Medicare annual wellness  Quality Measures:       Return in about 2 months (around 9/16/2021) for Medicare Annual Wellness  Diagnoses and all orders for this visit:    Abnormal serum protein test  Comments:  Abnormal Immunoglobulin free LT chains  Orders:  -     Ambulatory referral to Hematology / Oncology; Future    Nephrolithiasis  -     potassium citrate (Urocit-K 10) 10 mEq; Take 1 tablet (10 mEq total) by mouth 3 (three) times a day    Mixed hyperlipidemia  -     rosuvastatin (CRESTOR) 5 mg tablet; Take 2 tablets (10 mg total) by mouth daily    Impaired fasting glucose    Abdominal aortic aneurysm (AAA) without rupture (HCC)    Benign hypertension    Stage 3b chronic kidney disease ([facility] [address] )    Sjogren's syndrome with keratoconjunctivitis sicca (HCC)          Subjective:      Patient ID: Roma Mchugh is a 76 y o  female  Follow-up, labs reviewed with patient  Patient currently seeing multiple specialists  Patient has been following with Rheumatology due to Sjogren's, has been contacted by Rheumatology due to abnormal immunoglobulin free LT chains testing and 1 patient referred to Hematology  Patient would like to be referred to local Hematology  Abdominal aortic aneurysm: Following with Cardiology and vascular surgery  Most recent imaging indicates that it is stable  Nephrolithiasis:  Being followed by Urology plus Nephrology  Potassium citrate increased to 3 times a day  CKD 3:  Being followed  Patient encouraged to stay hydrated and avoid nephrotoxic substances  Hyperlipidemia:  Labs show improvement  She informs me cardiology has increased her rosuvastatin to 10 mg daily  She is tolerating the medication        Impaired fasting glucose:  Fasting sugar 113 with an A1c of 6 2   No complaint of polyuria, polyphagia, polydipsia  Trying hard to watch her diet  Hypertension:  Good control on current medications  Michelle cp, palp, sob, edema, HA, dizziness, diaphoresis, syncope, visual disturbance        ALLERGIES:  Allergies   Allergen Reactions    Other Other (See Comments)     "Portuguese food"- swollen tongue      Zosyn [Piperacillin Sod-Tazobactam So] Hives       CURRENT MEDICATIONS:    Current Outpatient Medications:     amLODIPine (NORVASC) 5 mg tablet, Take 1 tablet (5 mg total) by mouth daily, Disp: 30 tablet, Rfl: 5    Ascorbic Acid (VITAMIN C) 100 MG tablet, Take 1 tablet by mouth daily, Disp: , Rfl:     aspirin 81 mg chewable tablet, Chew 81 mg daily, Disp: , Rfl:     Coenzyme Q10 (CO Q 10) 10 MG CAPS, Take 1 capsule by mouth daily, Disp: , Rfl:     Multiple Vitamins-Minerals (EYE VITAMINS & MINERALS PO), Take by mouth, Disp: , Rfl:     Multiple Vitamins-Minerals (MULTIVITAMIN ADULT PO), Take 1 tablet by mouth daily, Disp: , Rfl:     olmesartan (BENICAR) 40 mg tablet, TAKE 1 TABLET BY MOUTH EVERY DAY, Disp: 90 tablet, Rfl: 1    Omega-3 Fatty Acids (FISH OIL PO), Take 7,000 mg by mouth daily , Disp: , Rfl:     PARoxetine (PAXIL) 10 mg tablet, TAKE 1 TABLET BY MOUTH EVERY DAY, Disp: 90 tablet, Rfl: 1    pilocarpine (SALAGEN) 5 mg tablet, Take 5 mg by mouth 4 (four) times a day, Disp: , Rfl:     potassium citrate (Urocit-K 10) 10 mEq, Take 1 tablet (10 mEq total) by mouth 3 (three) times a day, Disp: 90 tablet, Rfl: 1    rosuvastatin (CRESTOR) 5 mg tablet, Take 2 tablets (10 mg total) by mouth daily, Disp: 90 tablet, Rfl: 1    ACTIVE PROBLEM LIST:  Patient Active Problem List   Diagnosis    Nephrolithiasis    Allergic rhinitis    Benign hypertension    Diverticulosis    Hiatal hernia    Hyperlipidemia    Depression    Impaired fasting glucose    Diverticulitis    Left shoulder tendonitis    Carpal tunnel syndrome of left wrist    Abdominal aortic aneurysm (AAA) 3 0 cm to 5 0 cm in diameter in female [facility])    Abdominal aortic aneurysm (AAA) without rupture (HCC)    Sjogren's syndrome with keratoconjunctivitis sicca (HCC)    Anemia    Transaminitis    Stage 3b chronic kidney disease (HCC)       PAST MEDICAL HISTORY:  Past Medical History:   Diagnosis Date    Abdominal aortic aneurysm (AAA) ([facility] [address] )     Hyperlipidemia     Hypertension     Kidney stone     Kidney stones     Pneumonia     Sjogren's syndrome ([facility] [address] )        PAST SURGICAL HISTORY:  Past Surgical History:   Procedure Laterality Date    CATARACT EXTRACTION      DILATION AND CURETTAGE OF UTERUS      FL RETROGRADE PYELOGRAM  12/7/2020    FL RETROGRADE PYELOGRAM  2/4/2021    IR NEPHROURETERAL ACCESS FOR UROLOGY PCNL  12/1/2020    DE CYSTO/URETERO W/LITHOTRIPSY &INDWELL STENT INSRT Right 2/4/2021    Procedure: CYSTOSCOPY URETEROSCOPY WITH LITHOTRIPSY HOLMIUM LASER, RETROGRADE PYELOGRAM AND INSERTION STENT URETERAL;  Surgeon: Mesha Villa MD;  Location: [facility];  Service: Urology    DE CYSTO/URETERO/PYELOSCOPY, DX Right 2/15/2017    Procedure: URETEROSCOPY, STENT PLACEMENT ;  Surgeon: Mesha Villa MD;  [facility];  Service: Urology    DE PERCUT [place] CM Right 2/15/2017    Procedure: ACCESS INTERPOLAR CALYX, INSERTED TWO WIRES INTO BLADDER, NEPHROLITHOTOMY  PERCUTANEOUS ;  Surgeon: Mesha Villa MD;  [facility];  Service: Urology    DE PERCUT [place] CM Right 12/7/2020    Procedure: NEPHROLITHOTOMY  PERCUTANEOUS (PCNL);   Surgeon: Mesha Villa MD;  [facility];  Service: Urology    SHOULDER SURGERY         FAMILY HISTORY:  Family History   Problem Relation Age of Onset    Cancer Mother     Colon cancer Mother 80        CARCINOMA     Melanoma Mother     Alzheimer's disease Father     Urinary tract infection Sister     Other Sister 39        urinary tract cancer    Cancer Brother         BLADDER    Stroke Maternal Grandfather         CVA    No Known Problems Daughter     No Known Problems Maternal Grandmother     No Known Problems Paternal Grandmother     No Known Problems Sister     No Known Problems Sister     No Known Problems Daughter     No Known Problems Daughter     Lung cancer Maternal Aunt 79    No Known Problems Paternal Aunt     No Known Problems Paternal Aunt        SOCIAL HISTORY:  Social History     Socioeconomic History    Marital status: /Civil Union     Spouse name: Not on file    Number of children: 11    Years of education: Not on file    Highest education level: Not on file   Occupational History    Occupation: [employer]    Tobacco Use    Smoking status: Former Smoker     Packs/day: 1 00     Years: 20 00     Pack years: 20 00     Quit date: 2000     Years since quittin 5    Smokeless tobacco: Former User     Quit date:     Tobacco comment: smokes "very rarely"   Vaping Use    Vaping Use: Never used   Substance and Sexual Activity    Alcohol use: Yes    Drug use: No    Sexual activity: Never     Partners: Male   Other Topics Concern    Not on file   Social History Narrative    ACTIVITIES -- COMPUTERS, READING     BRUSHES TEETH TWICE A DAY     DAILY CAFFEINE CONSUMPTION 1 SERVING A DAY     DENTAL CARE REGULARLY     Social Determinants of Health     Financial Resource Strain:     Difficulty of Paying Living Expenses:    Food Insecurity:     Worried About Running Out of Food in the Last Year:     Ran Out of Food in the Last Year:    Transportation Needs:     Lack of Transportation (Medical):      Lack of Transportation (Non-Medical):    Physical Activity:     Days of Exercise per Week:     Minutes of Exercise per Session:    Stress:     Feeling of Stress :    Social Connections:     Frequency of Communication with Friends and Family:     Frequency of Social Gatherings with Friends and Family:     Attends Holiness Services:     Active Member of Clubs or Organizations:     Attends Club or Organization Meetings:     Marital Status:    Intimate Partner Violence:     Fear of Current or Ex-Partner:     Emotionally Abused:     Physically Abused:     Sexually Abused:        Review of Systems   Constitutional: Negative for activity change, chills, fatigue and fever  HENT: Negative for congestion  Eyes: Negative for discharge and visual disturbance  Respiratory: Negative for cough, chest tightness and shortness of breath  Cardiovascular: Negative for chest pain, palpitations and leg swelling  Gastrointestinal: Negative for abdominal pain  Endocrine: Negative for polydipsia, polyphagia and polyuria  Genitourinary: Negative for difficulty urinating, dysuria, flank pain and hematuria  Musculoskeletal: Negative for arthralgias and myalgias  Skin: Negative for rash  Allergic/Immunologic: Negative for immunocompromised state  Neurological: Negative for dizziness, syncope, weakness, light-headedness and headaches  Hematological: Negative for adenopathy  Does not bruise/bleed easily  Psychiatric/Behavioral: Negative for dysphoric mood  The patient is not nervous/anxious  Objective:  Vitals:    07/07/21 1436   BP: 98/60   BP Location: Left arm   Patient Position: Sitting   Cuff Size: Standard   Pulse: 87   Resp: 16   Temp: 98 6 °F (37 °C)   TempSrc: Tympanic   SpO2: 99%   Weight: 68 5 kg (151 lb)   Height: 5' 3" (1 6 m)     Body mass index is 26 75 kg/m²  Physical Exam  Vitals and nursing note reviewed  Constitutional:       General: She is not in acute distress  Appearance: She is well-developed  She is not ill-appearing  HENT:      Head: Normocephalic and atraumatic  Eyes:      Extraocular Movements: Extraocular movements intact  Pupils: Pupils are equal, round, and reactive to light  Neck:      Thyroid: No thyromegaly  Vascular: No carotid bruit or JVD     Cardiovascular: Rate and Rhythm: Normal rate and regular rhythm  Heart sounds: Normal heart sounds  Pulmonary:      Effort: Pulmonary effort is normal  No respiratory distress  Breath sounds: Normal breath sounds  Abdominal:      Tenderness: There is no abdominal tenderness  Musculoskeletal:      Cervical back: Neck supple  Right lower leg: No edema  Left lower leg: No edema  Lymphadenopathy:      Cervical: No cervical adenopathy  Skin:     General: Skin is warm and dry  Findings: No rash  Neurological:      General: No focal deficit present  Mental Status: She is alert and oriented to person, place, and time  Mental status is at baseline  Psychiatric:         Mood and Affect: Mood normal          Behavior: Behavior normal            RESULTS:    Recent Results (from the past 1008 hour(s))   Hemoglobin A1C    Collection Time: 06/10/21  9:38 AM   Result Value Ref Range    Hemoglobin A1C 6 2 (H) Normal 3 8-5 6%; PreDiabetic 5 7-6 4%;  Diabetic >=6 5%; Glycemic control for adults with diabetes <7 0% %     mg/dl   CBC and differential    Collection Time: 06/10/21  9:38 AM   Result Value Ref Range    WBC 5 90 4 31 - 10 16 Thousand/uL    RBC 3 68 (L) 3 81 - 5 12 Million/uL    Hemoglobin 10 8 (L) 11 5 - 15 4 g/dL    Hematocrit 34 1 (L) 34 8 - 46 1 %    MCV 93 82 - 98 fL    MCH 29 3 26 8 - 34 3 pg    MCHC 31 7 31 4 - 37 4 g/dL    RDW 14 0 11 6 - 15 1 %    MPV 9 3 8 9 - 12 7 fL    Platelets 405 930 - 987 Thousands/uL    nRBC 0 /100 WBCs    Neutrophils Relative 63 43 - 75 %    Immat GRANS % 0 0 - 2 %    Lymphocytes Relative 25 14 - 44 %    Monocytes Relative 8 4 - 12 %    Eosinophils Relative 3 0 - 6 %    Basophils Relative 1 0 - 1 %    Neutrophils Absolute 3 75 1 85 - 7 62 Thousands/µL    Immature Grans Absolute 0 01 0 00 - 0 20 Thousand/uL    Lymphocytes Absolute 1 47 0 60 - 4 47 Thousands/µL    Monocytes Absolute 0 44 0 17 - 1 22 Thousand/µL    Eosinophils Absolute 0 18 0 00 - 0 61 Thousand/µL    Basophils Absolute 0 05 0 00 - 0 10 Thousands/µL   Sedimentation rate, automated    Collection Time: 06/10/21  9:38 AM   Result Value Ref Range    Sed Rate 34 (H) 0 - 29 mm/hour   C-reactive protein    Collection Time: 06/10/21  9:38 AM   Result Value Ref Range    CRP <3 0 <3 0 mg/L   Immunoglobulin free LT chains blood    Collection Time: 06/10/21  9:38 AM   Result Value Ref Range    Ig Kappa Free Light Chain 78 6 (H) 3 3 - 19 4 mg/L    Ig Lambda Free Light Chain 33 8 (H) 5 7 - 26 3 mg/L    Kappa/Lambda FluidC Ratio 2 33 (H) 0 26 - 1 65   Lipid panel    Collection Time: 06/10/21  9:38 AM   Result Value Ref Range    Cholesterol 211 (H) 50 - 200 mg/dL    Triglycerides 56 <=150 mg/dL    HDL, Direct 98 >=40 mg/dL    LDL Calculated 102 (H) 0 - 100 mg/dL    Non-HDL-Chol (CHOL-HDL) 113 mg/dl   Comprehensive metabolic panel    Collection Time: 06/10/21  9:38 AM   Result Value Ref Range    Sodium 137 136 - 145 mmol/L    Potassium 4 8 3 5 - 5 3 mmol/L    Chloride 103 100 - 108 mmol/L    CO2 26 21 - 32 mmol/L    ANION GAP 8 4 - 13 mmol/L    BUN 31 (H) 5 - 25 mg/dL    Creatinine 1 34 (H) 0 60 - 1 30 mg/dL    Glucose, Fasting 113 (H) 65 - 99 mg/dL    Calcium 8 8 8 3 - 10 1 mg/dL    Corrected Calcium 9 5 8 3 - 10 1 mg/dL    AST 14 5 - 45 U/L    ALT 21 12 - 78 U/L    Alkaline Phosphatase 71 46 - 116 U/L    Total Protein 8 1 6 4 - 8 2 g/dL    Albumin 3 1 (L) 3 5 - 5 0 g/dL    Total Bilirubin 0 26 0 20 - 1 00 mg/dL    eGFR 39 ml/min/1 73sq m   Stone risk profile    Collection Time: 06/18/21 11:32 AM   Result Value Ref Range    Total Volume, Ur 1650 (H) 600 - 1600 mL/24 hr    Preserved Urine Volume 1650 (H) 600 - 1600 mL/24 hr    Ammonia, Urine 3250 Not Estab  ug/dL    Ammonia, 24HR Ur 3 Not Estab  mEq/24 hr    Calcium, Ur 4 3 Not Estab  mg/dL    Calcium 24HR Ur 71 0 (L) 100 0 - 300 0 mg/24 hr    Citrate, Ur 164 Not Estab  mg/L    Citrate 24H  (L) 320 - 1240 mg/24 hr    Chloride, Ur 33 Not Estab  mmol/L Chloride 24HR Ur 54 (L) 110 - 250 mmol/24 hr    Creatinine, Ur 30 3 Not Estab  mg/dL    Creatinine,Ur 24hr 500 0 (L) 800 0 - 1800 0 mg/24 hr    Cystine, Carlos  Urine 2 88 Not Estab  mg/L    Cystine, Quant, Ur, 24hr 4 75 (L) 10 00 - 100 00 mg/24 hr    Magnesium, Ur 2 4 Not Estab  mg/dL    Magnesium, 24H Ur 40 12 - 293 mg/24 hr    Oxalate, Ur 39 Not Estab  mg/L    Oxalate, 24HR Ur 64 (H) 4 - 31 mg/24 hr    Osmolality, Ur 242 (L) 300 - 900 mOsmol/kg    pH, 24 Hr Urine 7 0     Phosphorus, Ur 37 9 Not Estab  mg/dL    Phosphorus, 24HR Ur 625 4 400 0 - 1300 0 mg/24 hr    Potassium, Ur 24 7 Not Estab  mmol/L    Potassium, 24HR Ur 40 8 25 0 - 125 0 mmol/24 hr    Sodium, Ur 54 Not Estab  mmol/L    Sodium, 24HR Ur 89 39 - 258 mmol/24 hr    Sulfate, Ur, mEq/L 7 Not Estab  mEq/L    Sulfate, 24H Ur 12 0 - 30 mEq/24 hr    Uric Acid, Ur 21 1 Not Estab  mg/dL    Uric Acid, 24HR Ur 348 250 - 750 mg/24 hr    Brushite 1 32 0 00 - 3 00 ratio    Calcium Oxalate 6 24 (H) 0 00 - 6 00 ratio    Monosodium Urate 1 38 0 00 - 4 00 ratio    Struvite 0 02 0 00 - 1 00 ratio    Uric Acid 0 10 0 00 - 1 20 ratio    Please Note Comment        This note was created with voice recognition software  Phonic, grammatical and spelling errors may be present within the note as a result

## 2021-07-07 NOTE — PATIENT INSTRUCTIONS
Will refer patient to local Hematology for evaluation of abnormal proteins  No other changes at this time  Schedule follow-up here in mid September for Medicare annual wellness

## 2021-07-08 ENCOUNTER — TELEPHONE (OUTPATIENT)
Dept: HEMATOLOGY ONCOLOGY | Facility: CLINIC | Age: 76
End: 2021-07-08

## 2021-07-08 NOTE — TELEPHONE ENCOUNTER
New Patient Encounter    New Patient Intake Form   Patient Details:  Yuridia Cazares  1945  [id number]    Background Information:  [address] starts by opening a telephone encounter and gathering the following information   Who is calling to schedule? If not self, relationship to patient? Patient   Referring Provider Sandhya Hernandez    What is the diagnosis? Abnormal serum protein test   Is this Cancer or Non-Cancer? Non Cancer   Is this diagnosis confirmed? Yes   When was the diagnosis? 6-18-21   Is there a confirmed diagnosis from a biopsy/tissue reviewed by pathology? N/A   Were outside slides requested? N/A   Is patient aware of diagnosis? Yes   Is there a personal history and what kind? No   Is there a family history and what kind? No   Reason for visit? Have you had any imaging or labs done? If so: when, where? Yes 6-   Are records in [employer]? Yes   If patient has a prior history of cancer were old records obtained? N/A   Was the patient told to bring a disk? N/A   Does the patient smoke or Vape? No   If yes, how many packs or cartridges per day? Scheduling Information:   Preferred [place]:  [place]    Are there any dates/time the patient cannot be seen? Miscellaneous:    After completing the above information, please route to Financial Counselor and the appropriate Nurse Navigator for review

## 2021-07-22 NOTE — PROGRESS NOTES
HEMATOLOGY / ONCOLOGY CONSULTATION NOTE    Primary Care Provider: Evans Sweeney MD  Referring Provider: Carmen Antoine  MRN: [id number]  : 1945    Assessment / Plan:   1  Elevated serum protein level  Patient had mildly elevated serum protein at 8 1 & elevated immunoglobulin free lt chain with kappa at 78 6, lambda at 33 8, & K/L ratio at 2 33  Patient had these tests because she has CKD  She does not have any constitutional symptoms as below, bone pain  Alk phos normal, Ca 9 5  Mild baseline anemia last testing 10 8g/dL  We will obtain the below labs  Once I receive these labs, I will call her for further management instructions  If these labs come back significantly abnormal, we will proceed with BMBx  If they come back unremarkable, I will order follow up labs prior to tentative 3 month follow up  - Immunoglobulin free LT chains blood; Future  - IgG, IgA, IgM; Future  - Protein electrophoresis, serum; Future  - CBC and differential; Future  - Comprehensive metabolic panel; Future  - XR bone survey complete >12 mos; Future    2  Elevated serum immunoglobulin free light chain level  - as above     3  Stage 3b chronic kidney disease ([facility] [address] )  4  Anemia, unspecified type  - Patient has normocytic anemia since at least 2020 with baseline around 9-10g/dL  It is likely these labs are due to CKD; however, we will obtain the below labs to evaluate other causes  - Erythropoietin; Future  - Haptoglobin; Future  - Vitamin B12; Future  - Reticulocytes; Future    Patient voiced understanding and agreement to the above  The patient knows to call the office with any questions or concerns regarding the above  I have spent 60 minutes with Patient  today in which greater than 50% of this time was spent in counseling/coordination of care regarding Diagnostic results, Intructions for management, Patient and family education, Importance of tx compliance, Risk factor reductions and Impressions        Reason for visit:       Chief Complaint   Patient presents with    Consult       History of Hematology / Oncology Illness:     Reji Gillette is a 76 y o  female who came in for consultation  1  Elevated serum protein  - 6/10/21: protein 8 1    2  Elevated Immunoglobulin free Lt chain  - 6/10/21: kappa 78 6, lambda 33 8, K/L ratio 2 33    3  Chronic Normocytic Anemia   - since 2020  - baseline 9-10g/dL    4  Chronic Kidney Disease   - per pt since about     5  History of Acute GI bleed  - 2021  Colonoscopy showed significant amt of pancolonic fresh blood, multiple severe extensive diverticula containing blood, hemorrhoidal bleeding    - Multiple S/P 2 unit transfusions  ECO - Asymptomatic    Interval History:   21: This is a 76year old male with PMH of diverticulosis, allergic rhinitis, HTN, AAA, CKD, hiatal hernia, hyperlipidemia, Sjogren's syndrome presenting for consultation  She denies any constitutional symptoms such as fever, chills, night sweats, lumps, bumps, sudden weight loss  No bone pain  No bleeding which is noticed  She has a history of chronic kidney disease for the last 5-6 years  She is currently taking oral iron once daily  Has never had IV iron; however, she has had 2 units of PRBC during hospitalization 2021 after a GI bleed  She takes baby ASA once daily, but no other blood thinners  She is a former smoker, quit decades ago per pt  Does not drink  Retired, worked in real estate  No personal history of cancer  However, mother had colon cancer at 80  Sister  of bladder cancer  Last colonoscopy was 3/2021 during hospitalization   She states she is seeing her PCP soon who will order her mammogram      Problem list:       Patient Active Problem List   Diagnosis    Nephrolithiasis    Allergic rhinitis    Benign hypertension    Diverticulosis    Hiatal hernia    Hyperlipidemia    Depression    Impaired fasting glucose    Diverticulitis    Left shoulder tendonitis    Carpal tunnel syndrome of left wrist    Abdominal aortic aneurysm (AAA) 3 0 cm to 5 0 cm in diameter in female [facility])    Abdominal aortic aneurysm (AAA) without rupture ([address] )    Sjogren's syndrome with keratoconjunctivitis sicca (HCC)    Anemia    Transaminitis    Stage 3b chronic kidney disease (HCC)       REVIEW OF SYMPTOMS:   Review of Systems   Constitutional: Negative for activity change, appetite change, chills, diaphoresis, fatigue, fever and unexpected weight change  HENT: Negative for mouth sores and nosebleeds  Eyes: Negative for visual disturbance  Respiratory: Negative for apnea, cough, choking, chest tightness and shortness of breath  Cardiovascular: Negative for chest pain, palpitations and leg swelling  Gastrointestinal: Negative for abdominal pain, anal bleeding, blood in stool, constipation, diarrhea, nausea and vomiting  Endocrine: Negative for cold intolerance  Genitourinary: Negative for hematuria, menstrual problem and vaginal bleeding  Musculoskeletal: Negative for arthralgias  Skin: Negative for color change, pallor and rash  Neurological: Negative for dizziness, syncope, light-headedness and headaches  Hematological: Negative for adenopathy  Does not bruise/bleed easily  Psychiatric/Behavioral: Negative for sleep disturbance  PHYSICAL EXAMINATION:     Vital Signs:   /82   Pulse 87   Temp (!) 97 3 °F (36 3 °C)   Resp 18   Ht 5' 3" (1 6 m)   Wt 68 3 kg (150 lb 8 oz)   SpO2 97%   BMI 26 66 kg/m²   Body surface area is 1 71 meters squared  Ht Readings from Last 3 Encounters:   07/23/21 5' 3" (1 6 m)   07/07/21 5' 3" (1 6 m)   04/26/21 5' 3" (1 6 m)       Wt Readings from Last 3 Encounters:   07/23/21 68 3 kg (150 lb 8 oz)   07/07/21 68 5 kg (151 lb)   04/26/21 72 9 kg (160 lb 12 8 oz)          Physical Exam  Constitutional:       General: She is not in acute distress  Appearance: Normal appearance   She is not ill-appearing, toxic-appearing or diaphoretic  HENT:      Head: Normocephalic and atraumatic  Eyes:      General: No scleral icterus  Extraocular Movements: Extraocular movements intact  Conjunctiva/sclera: Conjunctivae normal       Pupils: Pupils are equal, round, and reactive to light  Cardiovascular:      Rate and Rhythm: Normal rate and regular rhythm  Heart sounds: Normal heart sounds  No murmur heard  Pulmonary:      Effort: Pulmonary effort is normal  No respiratory distress  Breath sounds: Normal breath sounds  No stridor  No wheezing, rhonchi or rales  Abdominal:      Palpations: Abdomen is soft  Tenderness: There is no abdominal tenderness  Musculoskeletal:         General: No tenderness  Normal range of motion  Cervical back: Normal range of motion and neck supple  Right lower leg: No edema  Left lower leg: No edema  Lymphadenopathy:      Cervical: No cervical adenopathy  Skin:     General: Skin is warm and dry  Coloration: Skin is not jaundiced or pale  Findings: No bruising, erythema or rash  Neurological:      General: No focal deficit present  Mental Status: She is alert and oriented to person, place, and time  Mental status is at baseline  Cranial Nerves: No cranial nerve deficit  Motor: No weakness  Psychiatric:         Mood and Affect: Mood normal          Behavior: Behavior normal          Thought Content: Thought content normal          Judgment: Judgment normal            Reviewed historical information          PAST MEDICAL HISTORY:    Past Medical History:   Diagnosis Date    Abdominal aortic aneurysm (AAA) ([facility] [address] )     Hyperlipidemia     Hypertension     Kidney stone     Kidney stones     Pneumonia     Sjogren's syndrome ([facility] [address] )        PAST SURGICAL HISTORY:    Past Surgical History:   Procedure Laterality Date    CATARACT EXTRACTION      DILATION AND CURETTAGE OF UTERUS      FL RETROGRADE PYELOGRAM  12/7/2020    FL RETROGRADE PYELOGRAM  2/4/2021    IR NEPHROURETERAL ACCESS FOR UROLOGY PCNL  12/1/2020    ID CYSTO/URETERO W/LITHOTRIPSY &INDWELL STENT INSRT Right 2/4/2021    Procedure: CYSTOSCOPY URETEROSCOPY WITH LITHOTRIPSY HOLMIUM LASER, RETROGRADE PYELOGRAM AND INSERTION STENT URETERAL;  Surgeon: Trino Anderson MD;  Location: [facility];  Service: Urology    ID CYSTO/URETERO/PYELOSCOPY, DX Right 2/15/2017    Procedure: URETEROSCOPY, STENT PLACEMENT ;  Surgeon: Trino Anderson MD;  [facility]: [facility];  Service: Urology    ID PERCUT [place] CM Right 2/15/2017    Procedure: ACCESS INTERPOLAR CALYX, INSERTED TWO WIRES INTO BLADDER, NEPHROLITHOTOMY  PERCUTANEOUS ;  Surgeon: Trino Anderson MD;  [facility];  Service: Urology    ID PERCUT [place] CM Right 12/7/2020    Procedure: NEPHROLITHOTOMY  PERCUTANEOUS (PCNL);   Surgeon: Trino Anderson MD;  [facility];  Service: Urology    SHOULDER SURGERY           CURRENT MEDICATIONS:     Current Outpatient Medications:     amLODIPine (NORVASC) 5 mg tablet, Take 1 tablet (5 mg total) by mouth daily, Disp: 30 tablet, Rfl: 5    Ascorbic Acid (VITAMIN C) 100 MG tablet, Take 1 tablet by mouth daily, Disp: , Rfl:     aspirin 81 mg chewable tablet, Chew 81 mg daily, Disp: , Rfl:     Coenzyme Q10 (CO Q 10) 10 MG CAPS, Take 1 capsule by mouth daily, Disp: , Rfl:     Multiple Vitamins-Minerals (EYE VITAMINS & MINERALS PO), Take by mouth, Disp: , Rfl:     Multiple Vitamins-Minerals (MULTIVITAMIN ADULT PO), Take 1 tablet by mouth daily, Disp: , Rfl:     olmesartan (BENICAR) 40 mg tablet, TAKE 1 TABLET BY MOUTH EVERY DAY, Disp: 90 tablet, Rfl: 1    Omega-3 Fatty Acids (FISH OIL PO), Take 7,000 mg by mouth daily , Disp: , Rfl:     PARoxetine (PAXIL) 10 mg tablet, TAKE 1 TABLET BY MOUTH EVERY DAY, Disp: 90 tablet, Rfl: 1    pilocarpine (SALAGEN) 5 mg tablet, Take 5 mg by mouth 4 (four) times a day, Disp: , Rfl:     potassium citrate (Urocit-K 10) 10 mEq, Take 1 tablet (10 mEq total) by mouth 3 (three) times a day, Disp: 90 tablet, Rfl: 1    rosuvastatin (CRESTOR) 5 mg tablet, Take 2 tablets (10 mg total) by mouth daily, Disp: 90 tablet, Rfl: 1    SOCIAL HISTORY:    Social History     Tobacco Use    Smoking status: Former Smoker     Packs/day: 1 00     Years: 20 00     Pack years: 20 00     Quit date:      Years since quittin 5    Smokeless tobacco: Former User     Quit date:     Tobacco comment: smokes "very rarely"   Vaping Use    Vaping Use: Never used   Substance Use Topics    Alcohol use:  Yes    Drug use: No       FAMILY HISTORY:    Family History   Problem Relation Age of Onset    Cancer Mother     Colon cancer Mother 80        CARCINOMA     Melanoma Mother     Alzheimer's disease Father     Urinary tract infection Sister     Other Sister 39        urinary tract cancer    Cancer Brother         BLADDER    Stroke Maternal Grandfather         CVA    No Known Problems Daughter     No Known Problems Maternal Grandmother     No Known Problems Paternal Grandmother     No Known Problems Sister     No Known Problems Sister     No Known Problems Daughter     No Known Problems Daughter     Lung cancer Maternal Aunt 79    No Known Problems Paternal Aunt     No Known Problems Paternal Aunt        ALLERGIES:    Allergies   Allergen Reactions    Other Other (See Comments)     "French food"- swollen tongue      Zosyn [Piperacillin Sod-Tazobactam So] Hives         LAB:    Lab Results   Component Value Date    WBC 5 90 06/10/2021    HGB 10 8 (L) 06/10/2021    HCT 34 1 (L) 06/10/2021    MCV 93 06/10/2021     06/10/2021       Lab Results   Component Value Date    SODIUM 137 06/10/2021    K 4 8 06/10/2021     06/10/2021    CO2 26 06/10/2021    AGAP 8 06/10/2021    BUN 31 (H) 06/10/2021    CREATININE 1 34 (H) 06/10/2021    GLUC 105 2021    GLUF 113 (H) 06/10/2021    CALCIUM 8 8 06/10/2021 AST 14 06/10/2021    ALT 21 06/10/2021    ALKPHOS 71 06/10/2021    TP 8 1 06/10/2021    TBILI 0 26 06/10/2021    EGFR 39 06/10/2021       IMAGING:  US kidney and bladder  Narrative: RENAL ULTRASOUND    INDICATION:   N20 0: Calculus of kidney  COMPARISON: CT from February 5, 2021    TECHNIQUE:   Ultrasound of the retroperitoneum was performed with a curvilinear transducer utilizing volumetric sweeps and still imaging techniques  FINDINGS:    KIDNEYS:  Symmetric and normal size  Right kidney:  9 8 x 4 3 x 4 3 cm  Left kidney:  11 9 x 6 1 x 3 9 cm  Right kidney  Normal echogenicity and contour  No suspicious masses detected  Mild prominence of the right renal pelvis noted without any recent dilation  The right-sided ureteral stent which was noted on the previous study of February 5, 2021 has been  2 echogenic calculi noted in the lower pole of the right kidney measuring 0 8 cm each  No perinephric fluid collections  Left kidney  Normal echogenicity and contour  A cyst is seen in the upper pole of the left kidney measuring 2 4 x 2 2 cm No hydronephrosis  No shadowing calculi  No perinephric fluid collections  URETERS:  Nonvisualized  BLADDER:   Normally distended  No focal thickening or mass lesions  Bilateral ureteral jets detected  Incidentally noted is aneurysmal dilation of the aorta  On ultrasound the maximal AP diameter of the mid aorta is 5 1  On the previous study of February 5, 2021 the aortic diameter is measured at 4 cm  Impression: Status post removal of the right ureteral stent  There is no hydronephrosis  Mild prominence of the right renal pelvis is again noted  There is no worsening dilation of the calyceal system    Nonobstructing 8 mm calculi lower pole right kidney    Abdominal aortic aneurysm in the mid aorta noted with maximal diameter of 5 1 cm  On the previous CT the maximal diameter was measured at 4 2 cm    The difference in the measurement may be due to technique or growth would suggest a repeat CT for sizing   the aortic aneurysm  Repeat CT may be performed without contrast    The study was marked in EPIC for significant notification      Workstation performed: [id number]

## 2021-07-23 ENCOUNTER — HOSPITAL ENCOUNTER (OUTPATIENT)
Dept: RADIOLOGY | Facility: HOSPITAL | Age: 76
Discharge: HOME | End: 2021-07-23
Payer: MEDICARE

## 2021-07-23 ENCOUNTER — APPOINTMENT (OUTPATIENT)
Dept: LAB | Facility: HOSPITAL | Age: 76
End: 2021-07-23
Payer: MEDICARE

## 2021-07-23 ENCOUNTER — CONSULT (OUTPATIENT)
Dept: HEMATOLOGY ONCOLOGY | Facility: CLINIC | Age: 76
End: 2021-07-23
Payer: MEDICARE

## 2021-07-23 VITALS
RESPIRATION RATE: 18 BRPM | HEIGHT: 63 IN | DIASTOLIC BLOOD PRESSURE: 82 MMHG | WEIGHT: 150.5 LBS | BODY MASS INDEX: 26.67 KG/M2 | TEMPERATURE: 97.3 F | SYSTOLIC BLOOD PRESSURE: 148 MMHG | HEART RATE: 87 BPM | OXYGEN SATURATION: 97 %

## 2021-07-23 DIAGNOSIS — D64.9 ANEMIA, UNSPECIFIED TYPE: ICD-10-CM

## 2021-07-23 DIAGNOSIS — R77.9 ELEVATED SERUM PROTEIN LEVEL: ICD-10-CM

## 2021-07-23 DIAGNOSIS — R77.8 ABNORMAL SERUM PROTEIN TEST: ICD-10-CM

## 2021-07-23 DIAGNOSIS — E43 UNSPECIFIED SEVERE PROTEIN-CALORIE MALNUTRITION (HCC): ICD-10-CM

## 2021-07-23 DIAGNOSIS — R77.9 ELEVATED SERUM PROTEIN LEVEL: Primary | ICD-10-CM

## 2021-07-23 DIAGNOSIS — N18.32 STAGE 3B CHRONIC KIDNEY DISEASE (HCC): ICD-10-CM

## 2021-07-23 DIAGNOSIS — R76.8 ELEVATED SERUM IMMUNOGLOBULIN FREE LIGHT CHAIN LEVEL: ICD-10-CM

## 2021-07-23 LAB
ALBUMIN SERPL BCP-MCNC: 3.7 G/DL (ref 3.5–5)
ALP SERPL-CCNC: 61 U/L (ref 46–116)
ALT SERPL W P-5'-P-CCNC: 23 U/L (ref 12–78)
ANION GAP SERPL CALCULATED.3IONS-SCNC: 9 MMOL/L (ref 4–13)
AST SERPL W P-5'-P-CCNC: 20 U/L (ref 5–45)
BASOPHILS # BLD AUTO: 0.04 THOUSANDS/ΜL (ref 0–0.1)
BASOPHILS NFR BLD AUTO: 1 % (ref 0–1)
BILIRUB SERPL-MCNC: 0.29 MG/DL (ref 0.2–1)
BUN SERPL-MCNC: 38 MG/DL (ref 5–25)
CALCIUM SERPL-MCNC: 9.3 MG/DL (ref 8.3–10.1)
CHLORIDE SERPL-SCNC: 99 MMOL/L (ref 100–108)
CO2 SERPL-SCNC: 27 MMOL/L (ref 21–32)
CREAT SERPL-MCNC: 1.41 MG/DL (ref 0.6–1.3)
EOSINOPHIL # BLD AUTO: 0.13 THOUSAND/ΜL (ref 0–0.61)
EOSINOPHIL NFR BLD AUTO: 2 % (ref 0–6)
ERYTHROCYTE [DISTWIDTH] IN BLOOD BY AUTOMATED COUNT: 14 % (ref 11.6–15.1)
FERRITIN SERPL-MCNC: 19 NG/ML (ref 8–388)
GFR SERPL CREATININE-BSD FRML MDRD: 36 ML/MIN/1.73SQ M
GLUCOSE P FAST SERPL-MCNC: 97 MG/DL (ref 65–99)
HCT VFR BLD AUTO: 35.2 % (ref 34.8–46.1)
HGB BLD-MCNC: 11.2 G/DL (ref 11.5–15.4)
IGA SERPL-MCNC: 249 MG/DL (ref 70–400)
IGG SERPL-MCNC: 2160 MG/DL (ref 700–1600)
IGM SERPL-MCNC: 54 MG/DL (ref 40–230)
IMM GRANULOCYTES # BLD AUTO: 0.02 THOUSAND/UL (ref 0–0.2)
IMM GRANULOCYTES NFR BLD AUTO: 0 % (ref 0–2)
IRON SATN MFR SERPL: 19 %
IRON SERPL-MCNC: 66 UG/DL (ref 50–170)
LYMPHOCYTES # BLD AUTO: 1.22 THOUSANDS/ΜL (ref 0.6–4.47)
LYMPHOCYTES NFR BLD AUTO: 18 % (ref 14–44)
MCH RBC QN AUTO: 29.5 PG (ref 26.8–34.3)
MCHC RBC AUTO-ENTMCNC: 31.8 G/DL (ref 31.4–37.4)
MCV RBC AUTO: 93 FL (ref 82–98)
MONOCYTES # BLD AUTO: 0.36 THOUSAND/ΜL (ref 0.17–1.22)
MONOCYTES NFR BLD AUTO: 5 % (ref 4–12)
NEUTROPHILS # BLD AUTO: 5.06 THOUSANDS/ΜL (ref 1.85–7.62)
NEUTS SEG NFR BLD AUTO: 74 % (ref 43–75)
NRBC BLD AUTO-RTO: 0 /100 WBCS
PLATELET # BLD AUTO: 306 THOUSANDS/UL (ref 149–390)
PMV BLD AUTO: 10.1 FL (ref 8.9–12.7)
POTASSIUM SERPL-SCNC: 4.7 MMOL/L (ref 3.5–5.3)
PROT SERPL-MCNC: 8.8 G/DL (ref 6.4–8.2)
RBC # BLD AUTO: 3.8 MILLION/UL (ref 3.81–5.12)
RETICS # AUTO: NORMAL 10*3/UL (ref 14097–95744)
RETICS # CALC: 1.54 % (ref 0.37–1.87)
SODIUM SERPL-SCNC: 135 MMOL/L (ref 136–145)
TIBC SERPL-MCNC: 346 UG/DL (ref 250–450)
VIT B12 SERPL-MCNC: 929 PG/ML (ref 100–900)
WBC # BLD AUTO: 6.83 THOUSAND/UL (ref 4.31–10.16)

## 2021-07-23 PROCEDURE — 83550 IRON BINDING TEST: CPT

## 2021-07-23 PROCEDURE — 84165 PROTEIN E-PHORESIS SERUM: CPT | Performed by: PATHOLOGY

## 2021-07-23 PROCEDURE — 80053 COMPREHEN METABOLIC PANEL: CPT

## 2021-07-23 PROCEDURE — 84165 PROTEIN E-PHORESIS SERUM: CPT

## 2021-07-23 PROCEDURE — 83883 ASSAY NEPHELOMETRY NOT SPEC: CPT

## 2021-07-23 PROCEDURE — 82607 VITAMIN B-12: CPT

## 2021-07-23 PROCEDURE — 85045 AUTOMATED RETICULOCYTE COUNT: CPT

## 2021-07-23 PROCEDURE — 99204 OFFICE O/P NEW MOD 45 MIN: CPT | Performed by: INTERNAL MEDICINE

## 2021-07-23 PROCEDURE — 82784 ASSAY IGA/IGD/IGG/IGM EACH: CPT

## 2021-07-23 PROCEDURE — 85025 COMPLETE CBC W/AUTO DIFF WBC: CPT

## 2021-07-23 PROCEDURE — 36415 COLL VENOUS BLD VENIPUNCTURE: CPT

## 2021-07-23 PROCEDURE — 77075 RADEX OSSEOUS SURVEY COMPL: CPT

## 2021-07-23 PROCEDURE — 82668 ASSAY OF ERYTHROPOIETIN: CPT

## 2021-07-23 PROCEDURE — 83540 ASSAY OF IRON: CPT

## 2021-07-23 PROCEDURE — 82728 ASSAY OF FERRITIN: CPT

## 2021-07-23 PROCEDURE — 83010 ASSAY OF HAPTOGLOBIN QUANT: CPT

## 2021-07-24 LAB
EPO SERPL-ACNC: 20.1 MIU/ML (ref 2.6–18.5)
HAPTOGLOB SERPL-MCNC: 202 MG/DL (ref 42–346)
KAPPA LC FREE SER-MCNC: 90 MG/L (ref 3.3–19.4)
KAPPA LC FREE/LAMBDA FREE SER: 2.47 {RATIO} (ref 0.26–1.65)
LAMBDA LC FREE SERPL-MCNC: 36.5 MG/L (ref 5.7–26.3)

## 2021-07-27 ENCOUNTER — TELEPHONE (OUTPATIENT)
Dept: HEMATOLOGY ONCOLOGY | Facility: CLINIC | Age: 76
End: 2021-07-27

## 2021-07-27 DIAGNOSIS — D50.9 IRON DEFICIENCY ANEMIA, UNSPECIFIED IRON DEFICIENCY ANEMIA TYPE: ICD-10-CM

## 2021-07-27 DIAGNOSIS — R76.8 ELEVATED SERUM IMMUNOGLOBULIN FREE LIGHT CHAIN LEVEL: Primary | ICD-10-CM

## 2021-07-27 LAB
ALBUMIN SERPL ELPH-MCNC: 4.51 G/DL (ref 3.5–5)
ALBUMIN SERPL ELPH-MCNC: 51.8 % (ref 52–65)
ALPHA1 GLOB SERPL ELPH-MCNC: 0.34 G/DL (ref 0.1–0.4)
ALPHA1 GLOB SERPL ELPH-MCNC: 3.9 % (ref 2.5–5)
ALPHA2 GLOB SERPL ELPH-MCNC: 0.92 G/DL (ref 0.4–1.2)
ALPHA2 GLOB SERPL ELPH-MCNC: 10.6 % (ref 7–13)
BETA GLOB ABNORMAL SERPL ELPH-MCNC: 0.47 G/DL (ref 0.4–0.8)
BETA1 GLOB SERPL ELPH-MCNC: 5.4 % (ref 5–13)
BETA2 GLOB SERPL ELPH-MCNC: 4.9 % (ref 2–8)
BETA2+GAMMA GLOB SERPL ELPH-MCNC: 0.43 G/DL (ref 0.2–0.5)
GAMMA GLOB ABNORMAL SERPL ELPH-MCNC: 2.04 G/DL (ref 0.5–1.6)
GAMMA GLOB SERPL ELPH-MCNC: 23.4 % (ref 12–22)
IGG/ALB SER: 1.07 {RATIO} (ref 1.1–1.8)
PROT PATTERN SERPL ELPH-IMP: ABNORMAL
PROT SERPL-MCNC: 8.7 G/DL (ref 6.4–8.2)

## 2021-07-27 NOTE — TELEPHONE ENCOUNTER
Called patient and informed her of lab results  Retic, Vitamin B12, EPO 20 1 mildly high, haptoglobin  Hgb improved, iron panel mildly low  No M spike on SPEP  Patient still has elevated free Lt chain with K/L ratio at 2 47  I informed her we can either repeat labs in 3 months prior to follow up or perform BMBx  She feels well, no constitutional symptoms, peripheral neuropathy  She will also take oral iron for now  Recheck iron panel in 3 months  If she has any s/s of constitutional symptoms, peripheral neuropathy she will call our office  Patient understands and agrees

## 2021-07-27 NOTE — TELEPHONE ENCOUNTER
Pt states she was returning a missed call to DELBERT SEGURA [facility], call back # [phone]

## 2021-08-01 DIAGNOSIS — N20.0 NEPHROLITHIASIS: ICD-10-CM

## 2021-08-01 RX ORDER — POTASSIUM CITRATE 10 MEQ/1
TABLET, EXTENDED RELEASE ORAL
Qty: 60 TABLET | Refills: 0 | Status: SHIPPED | OUTPATIENT
Start: 2021-08-01 | End: 2021-08-26

## 2021-08-15 DIAGNOSIS — I10 BENIGN HYPERTENSION: ICD-10-CM

## 2021-08-16 RX ORDER — AMLODIPINE BESYLATE 5 MG/1
TABLET ORAL
Qty: 90 TABLET | Refills: 1 | Status: SHIPPED | OUTPATIENT
Start: 2021-08-16

## 2021-08-26 DIAGNOSIS — N20.0 NEPHROLITHIASIS: ICD-10-CM

## 2021-08-26 RX ORDER — POTASSIUM CITRATE 10 MEQ/1
TABLET, EXTENDED RELEASE ORAL
Qty: 60 TABLET | Refills: 0 | Status: SHIPPED | OUTPATIENT
Start: 2021-08-26

## 2021-09-08 ENCOUNTER — HOSPITAL ENCOUNTER (EMERGENCY)
Facility: HOSPITAL | Age: 76
Discharge: HOME | End: 2021-09-08
Attending: EMERGENCY MEDICINE
Payer: MEDICARE

## 2021-09-08 ENCOUNTER — APPOINTMENT (EMERGENCY)
Dept: CT IMAGING | Facility: HOSPITAL | Age: 76
End: 2021-09-08
Payer: MEDICARE

## 2021-09-08 DIAGNOSIS — R42 VERTIGO: Primary | ICD-10-CM

## 2021-09-08 LAB
ALBUMIN SERPL BCP-MCNC: 3.3 G/DL (ref 3.5–5)
ALP SERPL-CCNC: 59 U/L (ref 46–116)
ALT SERPL W P-5'-P-CCNC: 23 U/L (ref 12–78)
ANION GAP SERPL CALCULATED.3IONS-SCNC: 10 MMOL/L (ref 4–13)
AST SERPL W P-5'-P-CCNC: 19 U/L (ref 5–45)
ATRIAL RATE: 90 BPM
BASOPHILS # BLD AUTO: 0.03 THOUSANDS/ΜL (ref 0–0.1)
BASOPHILS NFR BLD AUTO: 1 % (ref 0–1)
BILIRUB SERPL-MCNC: 0.26 MG/DL (ref 0.2–1)
BUN SERPL-MCNC: 29 MG/DL (ref 5–25)
CALCIUM ALBUM COR SERPL-MCNC: 9.6 MG/DL (ref 8.3–10.1)
CALCIUM SERPL-MCNC: 9 MG/DL (ref 8.3–10.1)
CHLORIDE SERPL-SCNC: 103 MMOL/L (ref 100–108)
CO2 SERPL-SCNC: 24 MMOL/L (ref 21–32)
CREAT SERPL-MCNC: 1.06 MG/DL (ref 0.6–1.3)
EOSINOPHIL # BLD AUTO: 0.14 THOUSAND/ΜL (ref 0–0.61)
EOSINOPHIL NFR BLD AUTO: 2 % (ref 0–6)
ERYTHROCYTE [DISTWIDTH] IN BLOOD BY AUTOMATED COUNT: 14.9 % (ref 11.6–15.1)
GFR SERPL CREATININE-BSD FRML MDRD: 51 ML/MIN/1.73SQ M
GLUCOSE SERPL-MCNC: 115 MG/DL (ref 65–140)
HCT VFR BLD AUTO: 34 % (ref 34.8–46.1)
HGB BLD-MCNC: 11.2 G/DL (ref 11.5–15.4)
IMM GRANULOCYTES # BLD AUTO: 0.02 THOUSAND/UL (ref 0–0.2)
IMM GRANULOCYTES NFR BLD AUTO: 0 % (ref 0–2)
LYMPHOCYTES # BLD AUTO: 1.48 THOUSANDS/ΜL (ref 0.6–4.47)
LYMPHOCYTES NFR BLD AUTO: 26 % (ref 14–44)
MCH RBC QN AUTO: 30.4 PG (ref 26.8–34.3)
MCHC RBC AUTO-ENTMCNC: 32.9 G/DL (ref 31.4–37.4)
MCV RBC AUTO: 92 FL (ref 82–98)
MONOCYTES # BLD AUTO: 0.39 THOUSAND/ΜL (ref 0.17–1.22)
MONOCYTES NFR BLD AUTO: 7 % (ref 4–12)
NEUTROPHILS # BLD AUTO: 3.72 THOUSANDS/ΜL (ref 1.85–7.62)
NEUTS SEG NFR BLD AUTO: 64 % (ref 43–75)
NRBC BLD AUTO-RTO: 0 /100 WBCS
P AXIS: 64 DEGREES
PLATELET # BLD AUTO: 302 THOUSANDS/UL (ref 149–390)
PMV BLD AUTO: 9.1 FL (ref 8.9–12.7)
POTASSIUM SERPL-SCNC: 4.3 MMOL/L (ref 3.5–5.3)
PR INTERVAL: 144 MS
PROT SERPL-MCNC: 8.1 G/DL (ref 6.4–8.2)
QRS AXIS: 91 DEGREES
QRSD INTERVAL: 122 MS
QT INTERVAL: 372 MS
QTC INTERVAL: 455 MS
RBC # BLD AUTO: 3.68 MILLION/UL (ref 3.81–5.12)
SODIUM SERPL-SCNC: 137 MMOL/L (ref 136–145)
T WAVE AXIS: 51 DEGREES
TROPONIN I SERPL-MCNC: <0.02 NG/ML
VENTRICULAR RATE: 90 BPM
WBC # BLD AUTO: 5.78 THOUSAND/UL (ref 4.31–10.16)

## 2021-09-08 PROCEDURE — 80053 COMPREHEN METABOLIC PANEL: CPT | Performed by: EMERGENCY MEDICINE

## 2021-09-08 PROCEDURE — 70498 CT ANGIOGRAPHY NECK: CPT

## 2021-09-08 PROCEDURE — 85025 COMPLETE CBC W/AUTO DIFF WBC: CPT | Performed by: EMERGENCY MEDICINE

## 2021-09-08 PROCEDURE — 99284 EMERGENCY DEPT VISIT MOD MDM: CPT

## 2021-09-08 PROCEDURE — 93005 ELECTROCARDIOGRAM TRACING: CPT

## 2021-09-08 PROCEDURE — 93010 ELECTROCARDIOGRAM REPORT: CPT | Performed by: INTERNAL MEDICINE

## 2021-09-08 PROCEDURE — 36415 COLL VENOUS BLD VENIPUNCTURE: CPT

## 2021-09-08 PROCEDURE — 99285 EMERGENCY DEPT VISIT HI MDM: CPT | Performed by: EMERGENCY MEDICINE

## 2021-09-08 PROCEDURE — 70496 CT ANGIOGRAPHY HEAD: CPT

## 2021-09-08 PROCEDURE — 84484 ASSAY OF TROPONIN QUANT: CPT | Performed by: EMERGENCY MEDICINE

## 2021-09-08 RX ORDER — MECLIZINE HYDROCHLORIDE 25 MG/1
25 TABLET ORAL 3 TIMES DAILY PRN
Qty: 20 TABLET | Refills: 0 | Status: SHIPPED | OUTPATIENT
Start: 2021-09-08

## 2021-09-08 RX ORDER — MECLIZINE HYDROCHLORIDE 25 MG/1
25 TABLET ORAL ONCE
Status: COMPLETED | OUTPATIENT
Start: 2021-09-08 | End: 2021-09-08

## 2021-09-08 RX ORDER — ONDANSETRON 4 MG/1
4 TABLET, ORALLY DISINTEGRATING ORAL ONCE
Status: COMPLETED | OUTPATIENT
Start: 2021-09-08 | End: 2021-09-08

## 2021-09-08 RX ADMIN — MECLIZINE HYDROCHLORIDE 25 MG: 25 TABLET ORAL at 14:22

## 2021-09-08 RX ADMIN — ONDANSETRON 4 MG: 4 TABLET, ORALLY DISINTEGRATING ORAL at 12:00

## 2021-09-08 RX ADMIN — IOHEXOL 85 ML: 350 INJECTION, SOLUTION INTRAVENOUS at 14:07

## 2021-09-08 NOTE — ED PROVIDER NOTES
History  Chief Complaint   Patient presents with    Dizziness     inrtermittent dizziness since sun, nausea--room spinning        History provided by:  Patient   used: No    Dizziness  Quality:  Room spinning  Severity:  Moderate  Onset quality:  Gradual  Timing:  Intermittent  Progression:  Waxing and waning  Chronicity:  New  Relieved by:  Nothing  Worsened by:  Nothing  Ineffective treatments:  None tried  Associated symptoms: nausea    Associated symptoms: no chest pain, no palpitations, no shortness of breath and no vomiting        Prior to Admission Medications   Prescriptions Last Dose Informant Patient Reported? Taking?    Ascorbic Acid (VITAMIN C) 100 MG tablet  Self Yes No   Sig: Take 1 tablet by mouth daily   Coenzyme Q10 (CO Q 10) 10 MG CAPS  Self Yes No   Sig: Take 1 capsule by mouth daily   Multiple Vitamins-Minerals (EYE VITAMINS & MINERALS PO)  Self Yes No   Sig: Take by mouth   Multiple Vitamins-Minerals (MULTIVITAMIN ADULT PO)  Self Yes No   Sig: Take 1 tablet by mouth daily   Omega-3 Fatty Acids (FISH OIL PO)  Self Yes No   Sig: Take 7,000 mg by mouth daily    PARoxetine (PAXIL) 10 mg tablet   No No   Sig: TAKE 1 TABLET BY MOUTH EVERY DAY   amLODIPine (NORVASC) 5 mg tablet   No No   Sig: TAKE 1 TABLET BY MOUTH EVERY DAY   aspirin 81 mg chewable tablet  Self Yes No   Sig: Chew 81 mg daily   olmesartan (BENICAR) 40 mg tablet  Self No No   Sig: TAKE 1 TABLET BY MOUTH EVERY DAY   pilocarpine (SALAGEN) 5 mg tablet  Self Yes No   Sig: Take 5 mg by mouth 4 (four) times a day   potassium citrate (UROCIT-K) 10 mEq   No No   Sig: Take 1 tablet by mouth twice daily   rosuvastatin (CRESTOR) 5 mg tablet   No No   Sig: Take 2 tablets (10 mg total) by mouth daily      Facility-Administered Medications: None       Past Medical History:   Diagnosis Date    Abdominal aortic aneurysm (AAA) (HCC)     Hyperlipidemia     Hypertension     Kidney stone     Kidney stones     Pneumonia     [facility]ren's syndrome [facility])        Past Surgical History:   Procedure Laterality Date    CATARACT EXTRACTION      DILATION AND CURETTAGE OF UTERUS      FL RETROGRADE PYELOGRAM  12/7/2020    FL RETROGRADE PYELOGRAM  2/4/2021    IR NEPHROURETERAL ACCESS FOR UROLOGY PCNL  12/1/2020    NV CYSTO/URETERO W/LITHOTRIPSY &INDWELL STENT INSRT Right 2/4/2021    Procedure: CYSTOSCOPY URETEROSCOPY WITH LITHOTRIPSY HOLMIUM LASER, RETROGRADE PYELOGRAM AND INSERTION STENT URETERAL;  Surgeon: Vipul Fisher MD;  [facility];  Service: Urology    NV CYSTO/URETERO/PYELOSCOPY, DX Right 2/15/2017    Procedure: URETEROSCOPY, STENT PLACEMENT ;  Surgeon: Vipul Fisher MD;  [facility];  Service: Urology    NV PERCUT Saytad CM Right 2/15/2017    Procedure: ACCESS INTERPOLAR CALYX, INSERTED TWO WIRES INTO BLADDER, NEPHROLITHOTOMY  PERCUTANEOUS ;  Surgeon: Vipul Fisher MD;  [facility];  Service: Urology    NV PERCUT Ken CM Right 12/7/2020    Procedure: NEPHROLITHOTOMY  PERCUTANEOUS (PCNL); Surgeon: Vipul Fisher MD;  [facility];  Service: Urology    SHOULDER SURGERY         Family History   Problem Relation Age of Onset    Cancer Mother     Colon cancer Mother 80        CARCINOMA     Melanoma Mother     Alzheimer's disease Father     Urinary tract infection Sister     Other Sister 39        urinary tract cancer    Cancer Brother         BLADDER    Stroke Maternal Grandfather         CVA    No Known Problems Daughter     No Known Problems Maternal Grandmother     No Known Problems Paternal Grandmother     No Known Problems Sister     No Known Problems Sister     No Known Problems Daughter     No Known Problems Daughter     Lung cancer Maternal Aunt 79    No Known Problems Paternal Aunt     No Known Problems Paternal Aunt      I have reviewed and agree with the history as documented      E-Cigarette/Vaping    E-Cigarette Use Never User      E-Cigarette/Vaping Substances     Social History     Tobacco Use    Smoking status: Former Smoker     Packs/day: 1 00     Years: 20 00     Pack years: 20 00     Quit date:      Years since quittin 7    Smokeless tobacco: Former User     Quit date:     Tobacco comment: smokes "very rarely"   Vaping Use    Vaping Use: Never used   Substance Use Topics    Alcohol use: Yes    Drug use: No       Review of Systems   Constitutional: Negative for chills and fever  HENT: Negative for ear pain and sore throat  Eyes: Negative for pain and visual disturbance  Respiratory: Negative for cough and shortness of breath  Cardiovascular: Negative for chest pain and palpitations  Gastrointestinal: Positive for nausea  Negative for abdominal pain and vomiting  Genitourinary: Negative for dysuria and hematuria  Musculoskeletal: Negative for arthralgias and back pain  Skin: Negative for color change and rash  Neurological: Positive for dizziness  Negative for seizures and syncope  All other systems reviewed and are negative  Physical Exam  Physical Exam  Vitals and nursing note reviewed  Constitutional:       General: She is not in acute distress  Appearance: She is well-developed  HENT:      Head: Normocephalic and atraumatic  Eyes:      Conjunctiva/sclera: Conjunctivae normal    Cardiovascular:      Rate and Rhythm: Normal rate and regular rhythm  Heart sounds: No murmur heard  Pulmonary:      Effort: Pulmonary effort is normal  No respiratory distress  Breath sounds: Normal breath sounds  Abdominal:      Palpations: Abdomen is soft  Tenderness: There is no abdominal tenderness  Musculoskeletal:      Cervical back: Neck supple  Skin:     General: Skin is warm and dry  Capillary Refill: Capillary refill takes less than 2 seconds  Neurological:      General: No focal deficit present        Mental Status: She is alert and oriented to person, place, and time  Cranial Nerves: No cranial nerve deficit  Sensory: No sensory deficit  Motor: No weakness  Coordination: Coordination normal       Gait: Gait normal       Deep Tendon Reflexes: Reflexes normal          Vital Signs  ED Triage Vitals   Temp Pulse Resp BP SpO2   -- -- -- -- --      Temp src Heart Rate Source Patient Position - Orthostatic VS BP Location FiO2 (%)   -- -- -- -- --      Pain Score       --           There were no vitals filed for this visit        Visual Acuity  Visual Acuity      Most Recent Value   L Pupil Size (mm)  2   R Pupil Size (mm)  2          ED Medications  Medications   ondansetron (ZOFRAN-ODT) dispersible tablet 4 mg (4 mg Oral Given 9/8/21 1200)   meclizine (ANTIVERT) tablet 25 mg (25 mg Oral Given 9/8/21 1422)   iohexol (OMNIPAQUE) 350 MG/ML injection (SINGLE-DOSE) 85 mL (85 mL Intravenous Given 9/8/21 1407)       Diagnostic Studies  Results Reviewed     Procedure Component Value Units Date/Time    Troponin I [[id number]]  (Normal) Collected: 09/08/21 1204    Lab Status: Final result Specimen: Blood from Arm, Left Updated: 09/08/21 1235     Troponin I <0 02 ng/mL     Comprehensive metabolic panel [[id number]]  (Abnormal) Collected: 09/08/21 1204    Lab Status: Final result Specimen: Blood from Arm, Left Updated: 09/08/21 1229     Sodium 137 mmol/L      Potassium 4 3 mmol/L      Chloride 103 mmol/L      CO2 24 mmol/L      ANION GAP 10 mmol/L      BUN 29 mg/dL      Creatinine 1 06 mg/dL      Glucose 115 mg/dL      Calcium 9 0 mg/dL      Corrected Calcium 9 6 mg/dL      AST 19 U/L      ALT 23 U/L      Alkaline Phosphatase 59 U/L      Total Protein 8 1 g/dL      Albumin 3 3 g/dL      Total Bilirubin 0 26 mg/dL      eGFR 51 ml/min/1 73sq m     Narrative:      Elizabet guidelines for Chronic Kidney Disease (CKD):     Stage 1 with normal or high GFR (GFR > 90 mL/min/1 73 square meters)    Stage 2 Mild CKD (GFR = 60-89 mL/min/1 73 square meters)    Stage 3A Moderate CKD (GFR = 45-59 mL/min/1 73 square meters)    Stage 3B Moderate CKD (GFR = 30-44 mL/min/1 73 square meters)    Stage 4 Severe CKD (GFR = 15-29 mL/min/1 73 square meters)    Stage 5 End Stage CKD (GFR <15 mL/min/1 73 square meters)  Note: GFR calculation is accurate only with a steady state creatinine    CBC and differential [[id number]]  (Abnormal) Collected: 09/08/21 1204    Lab Status: Final result Specimen: Blood from Arm, Left Updated: 09/08/21 1209     WBC 5 78 Thousand/uL      RBC 3 68 Million/uL      Hemoglobin 11 2 g/dL      Hematocrit 34 0 %      MCV 92 fL      MCH 30 4 pg      MCHC 32 9 g/dL      RDW 14 9 %      MPV 9 1 fL      Platelets 472 Thousands/uL      nRBC 0 /100 WBCs      Neutrophils Relative 64 %      Immat GRANS % 0 %      Lymphocytes Relative 26 %      Monocytes Relative 7 %      Eosinophils Relative 2 %      Basophils Relative 1 %      Neutrophils Absolute 3 72 Thousands/µL      Immature Grans Absolute 0 02 Thousand/uL      Lymphocytes Absolute 1 48 Thousands/µL      Monocytes Absolute 0 39 Thousand/µL      Eosinophils Absolute 0 14 Thousand/µL      Basophils Absolute 0 03 Thousands/µL                  CTA head and neck with and without contrast   Final Result by Fela Raman DO (09/08 1457)      Atherosclerotic calcification carotid bifurcations bilaterally without significant stenosis  Moderate stenosis right vertebral artery at the vertebrobasilar junction  Mild intracranial atherosclerosis with slight narrowing of the right M1 segment and right P-comm  Workstation performed: [id number]                    Procedures  Procedures         ED Course  ED Course as of Sep 22 0715   Wed Sep 08, 2021   1457 EKG reviewed by me, normal sinus rhythm at rate of 90  Right bundle branch block is present, otherwise normal axis and intervals  [address] [facility] Discussed patient's case with Dr Tejinder Ludwig of Neurology    He suspects that her CTA findings are unrelated to her symptoms  Also finds it is reassuring that symptoms have been intermittent and transient (lasting only minutes at a time) patient is also asymptomatic here in the emergency department and reports being completely asymptomatic in between episodes  Stroke Assessment     Row Name 09/08/21 1535             NIH Stroke Scale    Interval  Baseline      Level of Consciousness (1a )  0      LOC Questions (1b )  0      LOC Commands (1c )  0      Best Gaze (2 )  0      Visual (3 )  0      Facial Palsy (4 )  0      Motor Arm, Left (5a )  0      Motor Arm, Right (5b )  0      Motor Leg, Left (6a )  0      Motor Leg, Right (6b )  0      Limb Ataxia (7 )  0      Sensory (8 )  0      Best Language (9 )  0      Dysarthria (10 )  0      Extinction and Inattention (11 ) (Formerly Neglect)  0      Total  0          First Filed Value   TPA Decision  Patient not a TPA candidate  Patient is not a candidate options  Symptoms resolved/clearly non disabling  MDM  Number of Diagnoses or Management Options  Vertigo: new and requires workup  Diagnosis management comments: 75 yo F presented complaining of intermittent episodes of dizziness (vertigo-like symptoms) over the past 4-5 days  She was completely asymptomatic during her time in the ED today  She describes intermittent episodes that last for a few minutes at a time and are associated with nausea but not vomiting  Reports that symptoms are very positional when they occur  She reports that she feels completely normal in between the episodes  Discussed patient's symptoms and presentation with neurology on call as above  Discussed risks and benefits of admission for MRI vs dc with close op follow up  She prefers to go home if possible  After discussion with neuro, and after discussing with patient and her , I believe this is reasonable  She is already taking asa and a statin   She will follow up with her pcp and/or neurology in the next few days  Call doctor or return to ED immediately with recurrent, new, or worsening symptoms  Amount and/or Complexity of Data Reviewed  Clinical lab tests: ordered and reviewed  Tests in the radiology section of CPT®: ordered and reviewed  Review and summarize past medical records: yes  Discuss the patient with other providers: yes  Independent visualization of images, tracings, or specimens: yes        Disposition  Final diagnoses:   Vertigo     Time reflects when diagnosis was documented in both MDM as applicable and the Disposition within this note     Time User Action Codes Description Comment    9/8/2021  3:52 PM Marvin Betts Add [R42] Vertigo       ED Disposition     ED Disposition Condition Date/Time Comment    Discharge Stable Wed Sep 8, 2021  3:52 PM Luis Alberto Marion discharge to home/self care  Follow-up Information     Follow up With Specialties Details Why Contact Info Additional Information    Renetta Ramirez MD Internal Medicine   [address]  [phone]       [facility]   [address] [facility] [id number]  [address] [facility], [address] [facility],[address]    [facility], [address] Throat Otolaryngology   [address] [facility] [address] [facility] [address] [facility], [address] [facility], [address]    [address] [facility] [address] [place], [address]          Discharge Medication List as of 9/8/2021  3:57 PM      START taking these medications    Details   meclizine (ANTIVERT) 25 mg tablet Take 1 tablet (25 mg total) by mouth 3 (three) times a day as needed for dizziness, Starting Wed 9/8/2021, Print         CONTINUE these medications which have NOT CHANGED    Details   amLODIPine (NORVASC) 5 mg tablet TAKE 1 TABLET BY MOUTH EVERY DAY, Normal      Ascorbic Acid (VITAMIN C) 100 MG tablet Take 1 tablet by mouth daily, Historical Med      aspirin 81 mg chewable tablet Chew 81 mg daily, Historical Med      Coenzyme Q10 (CO Q 10) 10 MG CAPS Take 1 capsule by mouth daily, Historical Med      Multiple Vitamins-Minerals (EYE VITAMINS & MINERALS PO) Take by mouth, Historical Med      Multiple Vitamins-Minerals (MULTIVITAMIN ADULT PO) Take 1 tablet by mouth daily, Historical Med      olmesartan (BENICAR) 40 mg tablet TAKE 1 TABLET BY MOUTH EVERY DAY, Normal      Omega-3 Fatty Acids (FISH OIL PO) Take 7,000 mg by mouth daily , Starting Mon 3/28/2011, Historical Med      PARoxetine (PAXIL) 10 mg tablet TAKE 1 TABLET BY MOUTH EVERY DAY, Normal      pilocarpine (SALAGEN) 5 mg tablet Take 5 mg by mouth 4 (four) times a day, Historical Med      potassium citrate (UROCIT-K) 10 mEq Take 1 tablet by mouth twice daily, Normal      rosuvastatin (CRESTOR) 5 mg tablet Take 2 tablets (10 mg total) by mouth daily, Starting Wed 7/7/2021, No Print           No discharge procedures on file      PDMP Review     None          ED Provider  Electronically Signed by           Leyda pSencer MD  09/22/21 3346

## 2021-09-08 NOTE — DISCHARGE INSTRUCTIONS
Please follow-up promptly with your primary care provider and/or Neurology or ENT  Should you have any new or worsening symptoms please return immediately to the emergency department  Please be diligent about taking your daily aspirin, blood pressure medications, and cholesterol medications

## 2021-09-17 ENCOUNTER — TELEPHONE (OUTPATIENT)
Dept: HEMATOLOGY ONCOLOGY | Facility: CLINIC | Age: 76
End: 2021-09-17

## 2021-09-17 ENCOUNTER — TELEPHONE (OUTPATIENT)
Dept: INTERNAL MEDICINE CLINIC | Facility: CLINIC | Age: 76
End: 2021-09-17

## 2021-09-17 ENCOUNTER — TELEPHONE (OUTPATIENT)
Dept: NEPHROLOGY | Facility: CLINIC | Age: 76
End: 2021-09-17

## 2021-10-03 DIAGNOSIS — I10 BENIGN HYPERTENSION: ICD-10-CM

## 2021-10-03 RX ORDER — OLMESARTAN MEDOXOMIL 40 MG/1
TABLET ORAL
Qty: 90 TABLET | Refills: 1 | OUTPATIENT
Start: 2021-10-03

## (undated) DEVICE — 3M™ IOBAN™ 2 ANTIMICROBIAL INCISE DRAPE 6650EZ: Brand: IOBAN™ 2

## (undated) DEVICE — SNAP KOVER LGE DISP 36 X 29

## (undated) DEVICE — TRAY FOLEY 18FR URIMETER SURESTEP

## (undated) DEVICE — GLOVE SRG BIOGEL 7

## (undated) DEVICE — CYSTO TUBING TUR Y IRRIGATION

## (undated) DEVICE — SUT SILK 0 FSL 18 IN 678G

## (undated) DEVICE — DILATOR: Brand: COOK

## (undated) DEVICE — PACK TUR

## (undated) DEVICE — UROCATCH BAG

## (undated) DEVICE — GUIDEWIRE STRGHT TIP 0.035 IN  SOLO PLUS

## (undated) DEVICE — GLOVE INDICATOR PI UNDERGLOVE SZ 7 BLUE

## (undated) DEVICE — SPONGE 4 X 4 XRAY 16 PLY STRL LF RFD

## (undated) DEVICE — BASKET SPECIMEN RETRIVAL 1.9FR 120CM

## (undated) DEVICE — DRAPE INTESTINAL ISOLATION BAG

## (undated) DEVICE — GUIDEWIRE AMPLATZ SS .038 180CM

## (undated) DEVICE — CATH URET .038 10FR 50CM DUAL LUMEN

## (undated) DEVICE — TUBING SUCTION 5MM X 12 FT

## (undated) DEVICE — CHLORHEXIDINE 4PCT 4 OZ

## (undated) DEVICE — SYRINGE 10ML LL

## (undated) DEVICE — CATH BAL DIL NEP 10MM 15CM X-FRC N30 WO INFL DEV

## (undated) DEVICE — DRAPE CRANI

## (undated) DEVICE — SHEATH URETERAL ACCESS 12/14FR 35CM PROXIS

## (undated) DEVICE — GAUZE SPONGES,16 PLY: Brand: CURITY

## (undated) DEVICE — CHLORAPREP HI-LITE 26ML ORANGE

## (undated) DEVICE — CATH URETERAL 5FR X 70 CM FLEX TIP POLYUR BARD

## (undated) DEVICE — Device: Brand: MEDEX

## (undated) DEVICE — SCD SEQUENTIAL COMPRESSION COMFORT SLEEVE MEDIUM KNEE LENGTH: Brand: KENDALL SCD

## (undated) DEVICE — ANGIOGRAPHIC CATHETER: Brand: IMAGER™ II

## (undated) DEVICE — SUT SILK 0 CT-1 30 IN 424H

## (undated) DEVICE — 3M™ TEGADERM™ TRANSPARENT FILM DRESSING FRAME STYLE, 1624W, 2-3/8 IN X 2-3/4 IN (6 CM X 7 CM), 100/CT 4CT/CASE: Brand: 3M™ TEGADERM™

## (undated) DEVICE — NG KIT, 21 GA, 10 PACK: Brand: SITE-RITE

## (undated) DEVICE — INTENDED FOR TISSUE SEPARATION, AND OTHER PROCEDURES THAT REQUIRE A SHARP SURGICAL BLADE TO PUNCTURE OR CUT.: Brand: BARD-PARKER SAFETY BLADES SIZE 15, STERILE

## (undated) DEVICE — LUBRICANT SURGILUBE TUBE 4 OZ  FLIP TOP

## (undated) DEVICE — SUT MONOCRYL 4-0 PS-2 18 IN Y496G

## (undated) DEVICE — INFLATION DEVICE BASIX 30ATM

## (undated) DEVICE — LASER FIBER HOLMIUM 272MICRON

## (undated) DEVICE — INTENDED FOR TISSUE SEPARATION, AND OTHER PROCEDURES THAT REQUIRE A SHARP SURGICAL BLADE TO PUNCTURE OR CUT.: Brand: BARD-PARKER SAFETY BLADES SIZE 11, STERILE

## (undated) DEVICE — STRL UNIVERSAL MINOR GENERAL: Brand: CARDINAL HEALTH

## (undated) DEVICE — 3M™ TEGADERM™ TRANSPARENT FILM DRESSING FRAME STYLE, 1626W, 4 IN X 4-3/4 IN (10 CM X 12 CM), 50/CT 4CT/CASE: Brand: 3M™ TEGADERM™

## (undated) DEVICE — FIXED CORE WIRE GUIDE SAFE-T-J, CURVED: Brand: COOK

## (undated) DEVICE — LIGHT HANDLE COVER SLEEVE DISP BLUE STELLAR

## (undated) DEVICE — Device

## (undated) DEVICE — PROBE CYBERWAND ULTRASONIC

## (undated) DEVICE — GLOVE SRG BIOGEL ECLIPSE 7

## (undated) DEVICE — SUT CHROMIC 3-0 SH 27 IN G122H

## (undated) DEVICE — SPECIMEN CONTAINER STERILE PEEL PACK

## (undated) DEVICE — ARTHROSCOPY FLOOR MAT

## (undated) DEVICE — URIMETER 2500ML

## (undated) DEVICE — DRAPE SHEET THREE QUARTER

## (undated) DEVICE — ADHESIVE SKIN HIGH VISCOSITY EXOFIN 1ML

## (undated) DEVICE — ENDOSCOPIC VALVE WITH ADAPTER.: Brand: SURSEAL® II

## (undated) DEVICE — SUT CHROMIC 3-0 SH-1 27 IN G182H

## (undated) DEVICE — BETHLEHEM UNIVERSAL MINOR GEN: Brand: CARDINAL HEALTH

## (undated) DEVICE — 3M™ TEGADERM™ TRANSPARENT FILM DRESSING FRAME STYLE, 1628, 6 IN X 8 IN (15 CM X 20 CM), 10/CT 8CT/CASE: Brand: 3M™ TEGADERM™

## (undated) DEVICE — SUT VICRYL 3-0 SH 27 IN J416H

## (undated) DEVICE — SUT VICRYL 2-0 SH 27 IN UNDYED J417H

## (undated) DEVICE — 3000CC GUARDIAN II: Brand: GUARDIAN

## (undated) DEVICE — INVIEW CLEAR LEGGINGS: Brand: CONVERTORS

## (undated) DEVICE — LASER HOLMIUM FIBER 365 MIC

## (undated) DEVICE — ADHESIVE SKN CLSR HISTOACRYL FLEX 0.5ML LF

## (undated) DEVICE — GUIDEWIRE ANGLE TIP 0.038 IN SOLO PLUS

## (undated) DEVICE — SYRINGE CATH TIP 50ML

## (undated) DEVICE — RADIFOCUS GLIDEWIRE: Brand: GLIDEWIRE